# Patient Record
Sex: FEMALE | Race: WHITE | Employment: OTHER | URBAN - METROPOLITAN AREA
[De-identification: names, ages, dates, MRNs, and addresses within clinical notes are randomized per-mention and may not be internally consistent; named-entity substitution may affect disease eponyms.]

---

## 2017-01-29 ENCOUNTER — APPOINTMENT (OUTPATIENT)
Dept: GENERAL RADIOLOGY | Age: 77
End: 2017-01-29
Attending: EMERGENCY MEDICINE
Payer: MEDICARE

## 2017-01-29 ENCOUNTER — HOSPITAL ENCOUNTER (EMERGENCY)
Age: 77
Discharge: HOME | End: 2017-01-29
Attending: EMERGENCY MEDICINE | Admitting: EMERGENCY MEDICINE
Payer: MEDICARE

## 2017-01-29 VITALS
BODY MASS INDEX: 33.66 KG/M2 | RESPIRATION RATE: 13 BRPM | HEART RATE: 84 BPM | DIASTOLIC BLOOD PRESSURE: 56 MMHG | TEMPERATURE: 98.1 F | OXYGEN SATURATION: 97 % | WEIGHT: 190 LBS | HEIGHT: 63 IN | SYSTOLIC BLOOD PRESSURE: 118 MMHG

## 2017-01-29 DIAGNOSIS — S52.502A CLOSED FRACTURE OF DISTAL END OF LEFT RADIUS, UNSPECIFIED FRACTURE MORPHOLOGY, INITIAL ENCOUNTER: Primary | ICD-10-CM

## 2017-01-29 LAB
ATRIAL RATE: 81 BPM
CALCULATED P AXIS, ECG09: 49 DEGREES
CALCULATED R AXIS, ECG10: 13 DEGREES
CALCULATED T AXIS, ECG11: 43 DEGREES
DIAGNOSIS, 93000: NORMAL
P-R INTERVAL, ECG05: 188 MS
Q-T INTERVAL, ECG07: 388 MS
QRS DURATION, ECG06: 92 MS
QTC CALCULATION (BEZET), ECG08: 450 MS
VENTRICULAR RATE, ECG03: 81 BPM

## 2017-01-29 PROCEDURE — 99285 EMERGENCY DEPT VISIT HI MDM: CPT

## 2017-01-29 PROCEDURE — 73060 X-RAY EXAM OF HUMERUS: CPT

## 2017-01-29 PROCEDURE — 93005 ELECTROCARDIOGRAM TRACING: CPT

## 2017-01-29 PROCEDURE — 96372 THER/PROPH/DIAG INJ SC/IM: CPT

## 2017-01-29 PROCEDURE — 73100 X-RAY EXAM OF WRIST: CPT

## 2017-01-29 PROCEDURE — 73090 X-RAY EXAM OF FOREARM: CPT

## 2017-01-29 PROCEDURE — 75810000053 HC SPLINT APPLICATION

## 2017-01-29 PROCEDURE — 74011250636 HC RX REV CODE- 250/636: Performed by: EMERGENCY MEDICINE

## 2017-01-29 PROCEDURE — 74011250637 HC RX REV CODE- 250/637: Performed by: EMERGENCY MEDICINE

## 2017-01-29 RX ORDER — MORPHINE SULFATE 4 MG/ML
4 INJECTION, SOLUTION INTRAMUSCULAR; INTRAVENOUS
Status: COMPLETED | OUTPATIENT
Start: 2017-01-29 | End: 2017-01-29

## 2017-01-29 RX ORDER — OXYCODONE HYDROCHLORIDE 5 MG/1
5 TABLET ORAL
Status: DISCONTINUED | OUTPATIENT
Start: 2017-01-29 | End: 2017-01-29

## 2017-01-29 RX ORDER — OXYCODONE HYDROCHLORIDE 5 MG/1
15 TABLET ORAL ONCE
Status: COMPLETED | OUTPATIENT
Start: 2017-01-29 | End: 2017-01-29

## 2017-01-29 RX ADMIN — OXYCODONE HYDROCHLORIDE 5 MG: 5 TABLET ORAL at 05:59

## 2017-01-29 RX ADMIN — OXYCODONE HYDROCHLORIDE 10 MG: 5 TABLET ORAL at 05:48

## 2017-01-29 RX ADMIN — Medication 4 MG: at 03:26

## 2017-01-29 NOTE — ED TRIAGE NOTES
Pt brought in by EMS for a fall with left wrist pain. Pt states that she went to the bathroom and she felt dizzy when pulling up her pants and fell on her left wrist and hit the left side of her head. Pt is AxOx4, NAD.

## 2017-01-29 NOTE — ED PROVIDER NOTES
HPI Comments: 3:12 AM Lanie Del Cid is a 68 y.o. female with a history of hypertension, fibromyalgia, arthritis, breast cancer, GERD who presents to ED complaining of left wrist pain following a fall that occurred in her bathroom just prior to arrival to the ED. Patient states she caught herself with her left hand. States the left side of her head bumped the wall but states the only pain she is having is in her left wrist.    Patient denies left elbow/shoulder pain. Patient states that she takes a certain medication that causes her to have dizzy spells, but has controlled her tremors. Has h/o orthostasis as well. Denies any cp, sob, ha, blurry vision or recent illness. PCP: Marlon Licona MD    The history is provided by the patient and the spouse.         Past Medical History:   Diagnosis Date    Arrhythmia      hx PVCs    Arthritis      RHEUMATOID  and Osteo arthritis    Cancer ([facility] [address]      BREAST, broderick-broderick mastectomies    Constipation     Depression     Fibromyalgia     GERD (gastroesophageal reflux disease)     Headache(784.0)     Hearing reduced      hearing loss,ringing in ears    Hypertension     Insomnia     Neurological disorder      headaches,difficulty walking,poor memory    Osteoporosis     Other ill-defined conditions(799.89)      absessed perforated diverticulum    Other ill-defined conditions(799.89)      osteoporosis    Psychiatric disorder     Sleep apnea      CPAP    Vision decreased      poor vision,       Past Surgical History:   Procedure Laterality Date    Hx cholecystectomy      Hx gyn      Hx other surgical       multiple sx right leg and foot    Hx mastectomy  1996     bilateral mastectomy    Hx breast augmentation  1996     Saline Implants    Hx orthopaedic       RIGHT FOOT AND ANKLE SX    Hx knee arthroscopy Right     Hx gi  2012     LOW ANTERIOR RESECTION WITH COLOSTOMY AND LEFT OOPHORECTOMY         Family History:   Problem Relation Age of Onset    Hypertension Mother     Headache Mother     Diabetes Father     Hypertension Father     Heart Attack Father        Social History     Social History    Marital status:      Spouse name: N/A    Number of children: N/A    Years of education: N/A     Occupational History    Not on file. Social History Main Topics    Smoking status: Former Smoker     Quit date: 11/25/2006    Smokeless tobacco: Never Used    Alcohol use No    Drug use: No    Sexual activity: Not on file     Other Topics Concern    Not on file     Social History Narrative         ALLERGIES: Neurontin [gabapentin]    Review of Systems   Constitutional: Negative for fever. HENT: Negative for congestion. Respiratory: Negative for cough and shortness of breath. Cardiovascular: Negative for chest pain and leg swelling. Gastrointestinal: Negative for abdominal pain, nausea and vomiting. Genitourinary: Negative for dysuria. Musculoskeletal: Positive for arthralgias (left wrist) and joint swelling (left wrist). Neurological: Positive for dizziness and tremors. Negative for speech difficulty and headaches. All other systems reviewed and are negative. Vitals:    01/29/17 0320   BP: 140/63   Pulse: 83   Resp: 17   Temp: 98.1 °F (36.7 °C)   SpO2: 96%   Weight: 86.2 kg (190 lb)   Height: 5' 3\" (1.6 m)            Physical Exam   Constitutional: She is oriented to person, place, and time. No distress. HENT:   Head: Atraumatic. Eyes: Conjunctivae are normal.   Neck: Normal range of motion. Neck supple. No midline ttp or step off   Cardiovascular: Normal rate, regular rhythm, normal heart sounds and intact distal pulses. Pulmonary/Chest: Effort normal and breath sounds normal. She exhibits no tenderness. Abdominal: Soft. Bowel sounds are normal. She exhibits no distension. There is no tenderness. There is no rebound and no guarding. Musculoskeletal: She exhibits no edema.         Left wrist: She exhibits decreased range of motion and tenderness. ttp to distal left radius. Compartments soft. Full ROM of remainder or LUE. ecchymossi to left shoulder. No asymmetry. Neurological: She is alert and oriented to person, place, and time. She has normal strength. No cranial nerve deficit or sensory deficit. Gait normal.   Skin: Skin is warm and dry. Psychiatric: She has a normal mood and affect. Nursing note and vitals reviewed. MDM  Number of Diagnoses or Management Options  Closed fracture of distal end of left radius, unspecified fracture morphology, initial encounter:   Diagnosis management comments: Kwame Bustamante is a 68 y.o. female presenting after fall. Deformity and ttp to left distal radius. Given ecchymosis to shoulder will also obtain plain films of humerus. Pt with h/o orthostasis, neuro exam wnl, no red flags in history, no further ashwini p indicated at this time. No evidence of WPW, HOCM, Brugada, prolonged QTc, or acute ischemia on ekg. ED Course       Procedures    Vitals:  Patient Vitals for the past 12 hrs:   Temp Pulse Resp BP SpO2   01/29/17 0320 98.1 °F (36.7 °C) 83 17 140/63 96 %           EKG interpretation by ED Physician:  EKG was interpreted by me at 3:07 and showed normal sinus rhythm at a rate of 81. Normal axis. No STEMI. X-Ray, CT or other radiology findings or impressions:  XR WRIST LT AP/LAT   Final Result      XR FOREARM LT AP/LAT   Final Result      XR HUMERUS LT   Final Result              Progress notes, Consult notes or additional Procedure notes:   I have discussed results of work up with patient. Pt followed by Dr Nain Thompson at [facility]. Splint applied and neurovascularly intact post splint. I have instructed her to follow up with ortho and Return precautions discussed. Patient stated verbal understanding and agrees with course and plan. Disposition:  Diagnosis:   1.  Closed fracture of distal end of left radius, unspecified fracture morphology, initial encounter        Disposition: Discharged home in stable condition      Scribe [address] [place] [address] for and in the presence of Hortensia Rosales MD 3:16 AM, 01/29/17. Signed by: Anjali Hummel, 3:16 AM, 01/29/17. Physician Attestation  I personally performed the services described in the documentation, reviewed and edited the documentation which was dictated to the scribe in my presence, and it accurately records my words and actions.   Hortensia Rosales MD  3:16 AM, 01/29/17

## 2017-01-29 NOTE — ED NOTES
I have reviewed discharge instructions with the patient. The patient verbalized understanding. Patient armband removed and shredded. Pt is AxOx4, NAD. Pt taken out of ED by wheelchair, accompanied by her spouse.

## 2017-01-29 NOTE — DISCHARGE INSTRUCTIONS
Broken Arm: Care Instructions  Your Care Instructions  Fractures can range from a small, hairline crack, to a bone or bones broken into two or more pieces. Your treatment depends on how bad the break is. Your doctor may have put your arm in a splint or cast to allow it to heal or to keep it stable until you see another doctor. It may take weeks or months for your arm to heal. You can help your arm heal with some care at home. You heal best when you take good care of yourself. Eat a variety of healthy foods, and don't smoke. You may have had a sedative to help you relax. You may be unsteady after having sedation. It can take a few hours for the medicine's effects to wear off. Common side effects of sedation include nausea, vomiting, and feeling sleepy or tired. The doctor has checked you carefully, but problems can develop later. If you notice any problems or new symptoms, get medical treatment right away. Follow-up care is a key part of your treatment and safety. Be sure to make and go to all appointments, and call your doctor if you are having problems. It's also a good idea to know your test results and keep a list of the medicines you take. How can you care for yourself at home? · If the doctor gave you a sedative:  ¨ For 24 hours, don't do anything that requires attention to detail. It takes time for the medicine's effects to completely wear off. ¨ For your safety, do not drive or operate any machinery that could be dangerous. Wait until the medicine wears off and you can think clearly and react easily. · Put ice or a cold pack on your arm for 10 to 20 minutes at a time. Try to do this every 1 to 2 hours for the next 3 days (when you are awake). Put a thin cloth between the ice and your cast or splint. Keep the cast or splint dry. · Follow the cast care instructions your doctor gives you. If you have a splint, do not take it off unless your doctor tells you to. · Be safe with medicines.  Take pain medicines exactly as directed. ¨ If the doctor gave you a prescription medicine for pain, take it as prescribed. ¨ If you are not taking a prescription pain medicine, ask your doctor if you can take an over-the-counter medicine. · Prop up your arm on pillows when you sit or lie down in the first few days after the injury. Keep the arm higher than the level of your heart. This will help reduce swelling. · Follow instructions for exercises to keep your arm strong. · Wiggle your fingers and wrist often to reduce swelling and stiffness. When should you call for help? Call 911 anytime you think you may need emergency care. For example, call if:  · You have trouble breathing. · You passed out (lost consciousness). Call your doctor now or seek immediate medical care if:  · You have new or worse nausea or vomiting. · You have increased or severe pain. · Your hand is cool or pale or changes color. · You have tingling, weakness, or numbness in your hand or fingers. · Your cast or splint feels too tight. · You cannot move your fingers. · The skin under your cast or splint is burning or stinging. Watch closely for changes in your health, and be sure to contact your doctor if:  · You do not get better as expected. Where can you learn more? Go to http://ammy-landon.info/. Enter C277 in the search box to learn more about \"Broken Arm: Care Instructions. \"  Current as of: May 23, 2016  Content Version: 11.1  © 6546-8741 "[employer] Care instructions adapted under license by [employer] (which disclaims liability or warranty for this information). If you have questions about a medical condition or this instruction, always ask your healthcare professional. [place] [address] any warranty or liability for your use of this information.

## 2017-01-29 NOTE — ED NOTES
Dropped one 5 mg tab of Roxicodone on the floor, 10 mg Roxicodone given to pt. Medication wasted in pyxis. New order for 5 mg Roxicodone obtained.

## 2017-01-31 ENCOUNTER — OFFICE VISIT (OUTPATIENT)
Dept: ORTHOPEDIC SURGERY | Age: 77
End: 2017-01-31

## 2017-01-31 ENCOUNTER — HOSPITAL ENCOUNTER (OUTPATIENT)
Dept: LAB | Age: 77
Discharge: HOME | End: 2017-01-31
Payer: MEDICARE

## 2017-01-31 VITALS
WEIGHT: 184 LBS | HEIGHT: 63 IN | TEMPERATURE: 98.7 F | HEART RATE: 84 BPM | DIASTOLIC BLOOD PRESSURE: 76 MMHG | BODY MASS INDEX: 32.6 KG/M2 | SYSTOLIC BLOOD PRESSURE: 154 MMHG

## 2017-01-31 DIAGNOSIS — Z01.818 PRE-OPERATIVE EXAMINATION: ICD-10-CM

## 2017-01-31 DIAGNOSIS — S52.532A CLOSED COLLES' FRACTURE OF LEFT RADIUS, INITIAL ENCOUNTER: ICD-10-CM

## 2017-01-31 DIAGNOSIS — S52.532A CLOSED COLLES' FRACTURE OF LEFT RADIUS, INITIAL ENCOUNTER: Primary | ICD-10-CM

## 2017-01-31 LAB
ALBUMIN SERPL BCP-MCNC: 4.1 G/DL (ref 3.4–5)
ALBUMIN/GLOB SERPL: 1.5 {RATIO} (ref 0.8–1.7)
ALP SERPL-CCNC: 51 U/L (ref 45–117)
ALT SERPL-CCNC: 14 U/L (ref 13–56)
ANION GAP BLD CALC-SCNC: 10 MMOL/L (ref 3–18)
AST SERPL W P-5'-P-CCNC: 16 U/L (ref 15–37)
ATRIAL RATE: 92 BPM
BASOPHILS # BLD AUTO: 0 K/UL (ref 0–0.1)
BASOPHILS # BLD: 1 % (ref 0–2)
BILIRUB SERPL-MCNC: 0.5 MG/DL (ref 0.2–1)
BUN SERPL-MCNC: 16 MG/DL (ref 7–18)
BUN/CREAT SERPL: 16 (ref 12–20)
CALCIUM SERPL-MCNC: 9.3 MG/DL (ref 8.5–10.1)
CALCULATED P AXIS, ECG09: 53 DEGREES
CALCULATED R AXIS, ECG10: 26 DEGREES
CALCULATED T AXIS, ECG11: 40 DEGREES
CHLORIDE SERPL-SCNC: 97 MMOL/L (ref 100–108)
CO2 SERPL-SCNC: 32 MMOL/L (ref 21–32)
CREAT SERPL-MCNC: 1.01 MG/DL (ref 0.6–1.3)
DIAGNOSIS, 93000: NORMAL
DIFFERENTIAL METHOD BLD: ABNORMAL
EOSINOPHIL # BLD: 0.4 K/UL (ref 0–0.4)
EOSINOPHIL NFR BLD: 6 % (ref 0–5)
ERYTHROCYTE [DISTWIDTH] IN BLOOD BY AUTOMATED COUNT: 13.5 % (ref 11.6–14.5)
GLOBULIN SER CALC-MCNC: 2.7 G/DL (ref 2–4)
GLUCOSE SERPL-MCNC: 96 MG/DL (ref 74–99)
HCT VFR BLD AUTO: 43.1 % (ref 35–45)
HGB BLD-MCNC: 14 G/DL (ref 12–16)
LYMPHOCYTES # BLD AUTO: 24 % (ref 21–52)
LYMPHOCYTES # BLD: 1.7 K/UL (ref 0.9–3.6)
MCH RBC QN AUTO: 31.5 PG (ref 24–34)
MCHC RBC AUTO-ENTMCNC: 32.5 G/DL (ref 31–37)
MCV RBC AUTO: 97.1 FL (ref 74–97)
MONOCYTES # BLD: 0.8 K/UL (ref 0.05–1.2)
MONOCYTES NFR BLD AUTO: 12 % (ref 3–10)
NEUTS SEG # BLD: 4 K/UL (ref 1.8–8)
NEUTS SEG NFR BLD AUTO: 57 % (ref 40–73)
P-R INTERVAL, ECG05: 200 MS
PLATELET # BLD AUTO: 236 K/UL (ref 135–420)
PMV BLD AUTO: 11.2 FL (ref 9.2–11.8)
POTASSIUM SERPL-SCNC: 3.1 MMOL/L (ref 3.5–5.5)
PROT SERPL-MCNC: 6.8 G/DL (ref 6.4–8.2)
Q-T INTERVAL, ECG07: 346 MS
QRS DURATION, ECG06: 94 MS
QTC CALCULATION (BEZET), ECG08: 427 MS
RBC # BLD AUTO: 4.44 M/UL (ref 4.2–5.3)
SODIUM SERPL-SCNC: 139 MMOL/L (ref 136–145)
VENTRICULAR RATE, ECG03: 92 BPM
WBC # BLD AUTO: 6.9 K/UL (ref 4.6–13.2)

## 2017-01-31 PROCEDURE — 93005 ELECTROCARDIOGRAM TRACING: CPT

## 2017-01-31 RX ORDER — OXYCODONE AND ACETAMINOPHEN 5; 325 MG/1; MG/1
TABLET ORAL
Qty: 50 TAB | Refills: 0 | Status: SHIPPED | OUTPATIENT
Start: 2017-01-31 | End: 2017-03-03 | Stop reason: ALTCHOICE

## 2017-01-31 RX ORDER — PROMETHAZINE HYDROCHLORIDE 25 MG/1
25 TABLET ORAL
Qty: 30 TAB | Refills: 0 | Status: SHIPPED | OUTPATIENT
Start: 2017-01-31 | End: 2022-01-30

## 2017-01-31 RX ORDER — POLYETHYLENE GLYCOL 3350 17 G/17G
17 POWDER, FOR SOLUTION ORAL DAILY
Qty: 10 PACKET | Refills: 1 | Status: SHIPPED | OUTPATIENT
Start: 2017-01-31

## 2017-01-31 NOTE — PROGRESS NOTES
AMBULATORY PROGRESS NOTE      Patient: Lauren Corcoran             MRN: [id number]     SSN: [id number] Body mass index is 32.59 kg/(m^2). YOB: 1940     AGE: 68 y.o. EX: female    PCP: Yusef Campbell MD    IMPRESSION/DIAGNOSIS AND TREATMENT PLAN     DIAGNOSES  1. Closed Colles' fracture of left radius, initial encounter    2. Pre-operative examination        Orders Placed This Encounter    [89900] Wrist 3V    METABOLIC PANEL, COMPREHENSIVE    CBC WITH AUTOMATED DIFF    EKG, 12 LEAD, INITIAL    HI CLOSED RX DIST RAD/ULNA FX    SPLINT    promethazine (PHENERGAN) 25 mg tablet    polyethylene glycol (MIRALAX) 17 gram packet    oxyCODONE-acetaminophen (PERCOCET) 5-325 mg per tablet      Lauren Corcoran understands her diagnoses and the proposed plan. Plan:    1) HI Closed RX distal radius fracture  2) Sugar-tong placed on left wrist  3) XR: Left wrist; 3 view  4) Pre-op lab work: CMP, CBC w/ diff., EKG; follow up with PCP  5) Patient spoke with Dr. Iván Robles in the office today    RTO - Follow up with Dr. Caroline Araya IS A 68 y.o. female who presents to my outpatient office complaining of: a closed Colle's fracture of the distal end of the left radius. The patient presents to the office today with her . The injury occurred on 1/29/2017 at 2:00 AM. The patient states that she fell within the bathroom and caught herself with an outstretched palm. She is ambulating with a single point cane, and she is wearing bilateral custom hinged AFO braces. She reports that she would like to try and avoid having surgery unless necessary. The patient agreed to the proposed plan to reduce the radius. She understands the procedure and recovery period for her injury. Denies taking blood thinning medication. Patient spoke with Dr. Iván Robles regarding surgical intervention. The patient agreed with Dr. Howard Lozada for surgical intervention.     Patient reports h/o an irregular heartbeat and HTN. Juwan Licona is alert/oriented (name, location, time) and follows commands well. she  is in no acute distress and her affect and mood are appropriate. Cardiovascular/Peripheral Vascular: Normal Pulses to each hand and foot  Musculoskeletal:  LOCATION:   left wrist    HAND, WRIST, FOREARM:  left    Integumentary: No rashes, skin patches, wounds, blisters, or abrasions    Warm and normal color. No regions of expressible drainage       Deformities:  Is not present       Swelling: Regions of abnormal swelling: across the distal radius       Tenderness:  There are regions of tenderness : across the distal radius       Motor/Strength/Tone Exam: normal 5/5 strength in all tested muscle groups       Sensory Exam:  no sensory deficits noted       Stability Testing: NONE       ROM: diminished range with pain        Contractures:  none to affected region         Vascular : Normal capillary refill and digital coloration   No embolic phenomena to the toes or hands   Edema is not present        Neuro Exam:  Sensation : no focal            Motor function: functional    CHART REVIEW     Past Medical History   Diagnosis Date    Arrhythmia      hx PVCs    Arthritis      RHEUMATOID  and Osteo arthritis    Balance disorder 12/2016    Cancer ([facility] [address]      BREAST, broderick-broderick mastectomies    Constipation     Depression     Diverticular disease     Fibromyalgia     GERD (gastroesophageal reflux disease)     Headache(784.0)     Hearing reduced      hearing loss,ringing in ears    Hypertension     Insomnia     Neurological disorder      headaches,difficulty walking,poor memory    Osteoporosis     Other ill-defined conditions(799.89)      absessed perforated diverticulum    Other ill-defined conditions(799.89)      osteoporosis    Psychiatric disorder     Sleep apnea      CPAP    Vision decreased      poor vision,     Current Outpatient Prescriptions   Medication Sig    promethazine (PHENERGAN) 25 mg tablet Take 1 Tab by mouth every six (6) hours as needed for Nausea.  polyethylene glycol (MIRALAX) 17 gram packet Take 1 Packet by mouth daily.  oxyCODONE-acetaminophen (PERCOCET) 5-325 mg per tablet TAKE ONE TO TWO TABLETS PO Q 6 HRS AS NEEDED FOR PAIN **AFTER SURGERY** DO NOT TAKE BEFORE SURGERY  Indications: PAIN    [START ON 2/5/2017] morphine CR (MS CONTIN) 30 mg CR tablet Take 1 Tab by mouth every twelve (12) hours for 30 days. Max Daily Amount: 60 mg.    HYDROcodone-acetaminophen (NORCO)  mg tablet Take 1 Tab by mouth every six (6) hours as needed for Pain for up to 30 days. Max Daily Amount: 4 Tabs. May take an additional tablet on bad days not to exceed #135 per month. Indications: PAIN    Calcium-Cholecalciferol, D3, 600 mg(1,500mg) -400 unit cap Take  by mouth.  atorvastatin (LIPITOR) 10 mg tablet Take  by mouth daily.  lurasidone (LATUDA) 20 mg tab tablet Take  by mouth.  ondansetron (ZOFRAN ODT) 4 mg disintegrating tablet Take 1-2 tablets every 6-8 hours as needed for nausea and vomiting.  Denosumab (PROLIA) 60 mg/mL injection 60 mg by SubCUTAneous route.  ondansetron (ZOFRAN ODT) 4 mg disintegrating tablet Take 4 mg by mouth every eight (8) hours as needed for Nausea. Indications: ACUTE GASTROENTERITIS-RELATED VOMITING IN PEDIATRICS    buPROPion XL (WELLBUTRIN XL) 150 mg tablet Take 300 mg by mouth every morning.  polyethylene glycol (MIRALAX) 17 gram packet Take 17 g by mouth daily.  ALPRAZolam (XANAX) 1 mg tablet Take 1 mg by mouth as needed.  traZODone (DESYREL) 50 mg tablet Take 150 mg by mouth nightly. Indications: INSOMNIA    omeprazole (PRILOSEC) 20 mg capsule Take 20 mg by mouth two (2) times a day. Indications: GASTROESOPHAGEAL REFLUX    potassium chloride (K-DUR, KLOR-CON) 20 mEq tablet Take 1 Tab by mouth two (2) times a day. Indications: HYPOKALEMIA    Desvenlafaxine SR (PRISTIQ) 50 mg tablet Take 50 mg by mouth.  levorphanol (LEVO-DROMORAN) 2 mg tablet Take 1 Tab by mouth every eight (8) hours as needed. Max Daily Amount: 6 mg. OK to fill 9/2/15. Rx for Zohydro was too expensive and not filled    lidocaine (LIDODERM) 5 %(700 mg/patch) 1 patch by TransDERmal route. Apply patch to the affected area for 12 hours a day and remove for 12 hours a day.  fexofenadine-pseudoephedrine (ALLEGRA-D 24 HOUR) 180-240 mg per tablet Take 1 tablet by mouth daily.  diltiazem hcl (CARDIZEM) 120 mg tablet Take 120 mg by mouth daily.  diclofenac (VOLTAREN) 1 % topical gel Apply 4 g to affected area four (4) times daily. No current facility-administered medications for this visit. Allergies   Allergen Reactions    Neurontin [Gabapentin] Other (comments)     Past Surgical History   Procedure Laterality Date    Hx cholecystectomy      Hx gyn      Hx other surgical       multiple sx right leg and foot    Hx mastectomy  1996     bilateral mastectomy    Hx breast augmentation  1996     Saline Implants    Hx orthopaedic       RIGHT FOOT AND ANKLE SX    Hx knee arthroscopy Right     Hx gi  2012     LOW ANTERIOR RESECTION WITH COLOSTOMY AND LEFT OOPHORECTOMY    Pr foot/toes surgery proc unlisted       Social History     Occupational History    Not on file. Social History Main Topics    Smoking status: Former Smoker     Quit date: 11/25/2006    Smokeless tobacco: Never Used    Alcohol use No    Drug use: No    Sexual activity: Not on file     Family History   Problem Relation Age of Onset    Hypertension Mother     Headache Mother     Diabetes Father     Hypertension Father     Heart Attack Father        REVIEW OF SYSTEMS : 2/1/2017  ALL BELOW ARE Negative except : SEE HPI       Constitutional: Negative for fever, chills and weight loss. Neg Weigh Loss  Cardiovascular: Negative for chest pain, claudication and leg swelling.  SOB, MAX   Gastrointestinal: Negative for  pain, N/V/D/C, Blood in stool or urine,dysuria, hematuria,        Incontinence, pelvic pain  Musculoskeletal: see HPI. Neurological: Negative for dizziness and weakness. Negative for headaches,Visual Changes, Confusion, Seizures,   Psychiatric/Behavioral: Negative for depression, memory loss and substance abuse. Extremities:  Negative for  hair changes, rash or skin lesion changes. Hematologic: Negative for Bleeding problems, bruising, pallor or swollen lymph nodes. Peripheral Vascular: No calf pain, vascular vein tenderness to calf pain              No calf throbbing, posterior knee throbbing pain    DIAGNOSTIC IMAGING     XR Results (most recent):    Results from Hospital Encounter encounter on 01/29/17   XR FOREARM LT AP/LAT   Narrative EXAM:  XR FOREARM LT AP/LAT    INDICATION:   Pain status post fall    COMPARISON: None. FINDINGS: Two views of the left radius and ulna performed. These demonstrate a  fracture with impaction of the distal radius. Impression IMPRESSION: Impacted distal radius fracture. EXAM: XR WRIST LT AP/LAT     INDICATION: Pain left wrist area status post fall     COMPARISON: None.     FINDINGS: AP and lateral views of the left wrist demonstrate diminished bone  mineralization. There is an impacted fracture through the distal radius. The  distal ulna appears intact. Due to overlapping bony structures, positioning, and  wires, the carpal bones are poorly evaluated.      IMPRESSION  IMPRESSION: Distal left radius fracture with impaction. Poorly evaluated carpal  bones. Suggest clinical correlation, and repeat examination with proper  positioning if possible, and overlying wires removed if there is focal  tenderness in the carpal bones. HUMERUS     CPT CODE: 37392      HISTORY: Pain in the left upper arm status post fall.      COMPARISON: None.     FINDINGS:     Two views of the humerus submitted. No fracture or dislocation seen. Soft  tissues are unremarkable.  Several clips noted in the left axillary region.     IMPRESSION  IMPRESSION:     Humerus appears intact.     Written by James Unger, as dictated by Morena Lucio MD.

## 2017-01-31 NOTE — PATIENT INSTRUCTIONS
Dr. Amy Chávez Pre-operative Instructions:      Patient: Shana Quinn   :  1940     I understand I am to stop taking all Aspirin, Aspirin containing medications, Non-Steroidal Anti-Inflammatory medications (such as Advil, Aleve, Motrin, Ibuprofen) and or Blood thinner medication such as Coumadin, Plavix, Heparin or others 5 days prior to surgery. I understand I am to STOP taking these Medications 5 days prior to surgery:  I am to get instructions from my prescribing physician. 1. __As listed above_______________________  2. _____________________________________  3. _____________________________________  4. _____________________________________    I understand that if I am taking daily medications for high blood pressure, I can take them the morning of surgery with a small sip of water. I will consult my prescribing physician or call CELE[facility]INE with specific questions. I also understand that:     I am to report important observations or changes that may occur prior to surgery. If I have any changes in my physical condition, such as a rash, a fever, sore throat, abscess, ulcers, nausea, vomiting, or diarrhea. I am to call the office and I am to consult my primary care physician to assess and treat the problem.  I am not to eat or drink anything after midnight the night before my surgery.  I am not to drink alcoholic beverages 24 hours prior to surgery.  I am not to do any illegal drugs prior to surgery.  I am not to smoke at least 24 hours prior to surgery.  I am able and will shower or bathe before surgery. I will use the Hibiclens solution on my surgical site only. The hibiclens directions are one packet a day starting two days before surgery.  I will remove any nail polish, make-up or jewelry prior to arriving for my surgery.  If I wear glasses, contact lenses or dentures they must be removed prior to going to the operating room.      I will not wear any aerosol sprays, perfumes or skin creams.  I am to make arrangements for a family member or friend to accompany me to surgery and take me home after my surgery as I will not be allowed to leave the hospital alone. A cab or bus will not be acceptable. Please make arrange for someone to stay with you for 24 hours after surgery.  Patient has expressed understanding of the diagnosis, treatment and planned surgery        Surgery: What to Expect at [address] [place] [address]  This care sheet gives you a general idea about how long it will take for you to recover from your surgery. But each person recovers at a different pace. How can you care for yourself at home? Activity  · Allow your body to heal. Don't move quickly or lift anything heavy until you are feeling better. · Rest when you feel tired. · Your doctor may give you specific instructions on when you can do your normal activities again, such as driving and going back to work. · Be active. Walking is a good choice. Diet  · You can eat your normal diet when you feel well. If your stomach is upset, try bland, low-fat foods like plain rice, broiled chicken, toast, and yogurt. · If your bowel movements are not regular right after surgery, try to avoid constipation and straining. Drink plenty of water. Your doctor may suggest fiber, a stool softener, or a mild laxative. Medicines  · Your doctor will tell you if and when you can restart your medicines. He or she will also give you instructions about taking any new medicines. · If you take blood thinners, such as warfarin (Coumadin), clopidogrel (Plavix), or aspirin, be sure to talk to your doctor. He or she will tell you if and when to start taking those medicines again. Make sure that you understand exactly what your doctor wants you to do. · Be safe with medicines. Read and follow all instructions on the label. ¨ If the doctor gave you a prescription medicine for pain, take it as prescribed.   ¨ If you are not taking a prescription pain medicine, ask your doctor if you can take an over-the-counter medicine. Incision care  · You will have a dressing over the cut (incision). A dressing helps the incision heal and protects it. Your doctor will tell you how to take care of this. · If you have strips of tape on the cut the doctor made, leave the tape on for a week or until it falls off. · If you had stitches, your doctor will tell you when to come back to have them removed. · If you have skin adhesive on the cut, leave it on until it falls off. Skin adhesive is also called liquid stitches. · Change the bandage every day. · Wash the area daily with warm, soapy water, and pat it dry. Don't use hydrogen peroxide or alcohol. They can slow healing. · You may cover the area with a gauze bandage if it oozes fluid or rubs against clothing. · You may shower 24 to 48 hours after surgery. Pat the incision dry. Don't swim or take a bath for the first 2 weeks, or until your doctor tells you it is okay. Follow-up care is a key part of your treatment and safety. Be sure to make and go to all appointments, and call your doctor if you are having problems. It's also a good idea to know your test results and keep a list of the medicines you take. When should you call for help? Call 911 anytime you think you may need emergency care. For example, call if:  · You passed out (lost consciousness). · You have severe trouble breathing. · You have sudden chest pain and shortness of breath, or you cough up blood. Call your doctor now or seek immediate medical care if:  · You have pain that does not get better after you take pain medicine. · You have loose stitches, or your incision comes open. · You are bleeding through your dressing. A small amount of blood is normal.  · You have signs of infection, such as:  ¨ Increased pain, swelling, warmth, or redness. ¨ Red streaks leading from the incision.   ¨ Pus draining from the incision. ¨ A fever. · You have symptoms of a blood clot in your arm or leg (called a deep vein thrombosis). These may include:  ¨ Pain in your calf, back of the knee, thigh, or groin. ¨ Redness and swelling in the arm, leg, or groin. Watch closely for any changes in your health, and be sure to contact your doctor if:  · You do not have a bowel movement after taking a laxative. Where can you learn more? Go to http://ammy-landon.info/  Enter A443 in the search box to learn more about \"Surgery: What to Expect at Home. \"  © 7469-7018 Health[employer], [employer]. Care instructions adapted under license by [employer] (which disclaims liability or warranty for this information). This care instruction is for use with your licensed healthcare professional. If you have questions about a medical condition or this instruction, always ask your healthcare professional. [place] [address] any warranty or liability for your use of this information. Content Version: 52.6.438169; Current as of: November 20, 2015         Broken Wrist: Care Instructions  Your Care Instructions    Your wrist can break, or fracture, during sports, a fall, or other accidents. The break may happen when your wrist is hit or is used to protect you in a fall. Fractures can range from a small, hairline crack, to a bone or bones broken into two or more pieces. Your treatment depends on how bad the break is. Your doctor may have put your wrist in a cast or splint. This will help keep your wrist stable until your follow-up appointment. It may take weeks or months for your wrist to heal. You can help it heal with care at home. You heal best when you take good care of yourself. Eat a variety of healthy foods, and don't smoke. Follow-up care is a key part of your treatment and safety. Be sure to make and go to all appointments, and call your doctor if you are having problems.  It's also a good idea to know your test results and keep a list of the medicines you take. How can you care for yourself at home? · Put ice or a cold pack on your wrist for 10 to 20 minutes at a time. Try to do this every 1 to 2 hours for the next 3 days (when you are awake). Put a thin cloth between the ice and your cast or splint. Keep your cast or splint dry. · Follow the splint or cast care instructions your doctor gives you. If you have a splint, do not take it off unless your doctor tells you to. Be careful not to put the splint on too tight. · Be safe with medicines. Take pain medicines exactly as directed. ¨ If the doctor gave you a prescription medicine for pain, take it as prescribed. ¨ If you are not taking a prescription pain medicine, ask your doctor if you can take an over-the-counter medicine. · Prop up your wrist on pillows when you sit or lie down in the first few days after the injury. Keep your wrist higher than the level of your heart. This will help reduce swelling. · Move your fingers often to reduce swelling and stiffness, but do not use that hand to grab or carry anything. · Follow instructions for exercises to keep your arm strong. When should you call for help? Call your doctor now or seek immediate medical care if:  · You have increased or severe pain. · Your cast or splint feels too tight. · You cannot move your fingers. · You have tingling, weakness, or numbness in your hand and fingers. · Your hand and fingers are cool or pale or change color. · You have a lot of swelling near your cast or splint. · The skin under your cast or splint is burning or stinging. Watch closely for changes in your health, and be sure to contact your doctor if:  · You do not get better as expected. Where can you learn more? Go to http://ammy-landon.info/. Enter [id number] in the search box to learn more about \"Broken Wrist: Care Instructions. \"  Current as of: May 23, 2016  Content Version: 11.1  © 5600-4541 [employer]. Care instructions adapted under license by [employer] (which disclaims liability or warranty for this information). If you have questions about a medical condition or this instruction, always ask your healthcare professional. [address] any warranty or liability for your use of this information.

## 2017-01-31 NOTE — H&P
HISTORY AND PHYSICAL      Patient: Alexis Slater                MRN: [id number]       SSN: [id number]  YOB: 1940                        AGE: 68 y.o. SEX: female      Patient scheduled for:  Open reduction internal fixation left distal radius fracture, possible bone grafting   Date of surgery: 2/2/17   Location of Surgery: [address] [facility]  Surgeon: Nicci Bermudez MD    ANESTHESIA TYPE:  General      HISTORY:     The patient was seen in the office today for a preoperative history and physical for an upcoming above listed surgery. The patient is a pleasant 68 y.o. female who has a history of a fall while at home in her bathroom resulting in a left distal radius fracture. Due to the current findings, affected activity of daily living and continued pain and discomfort, surgical intervention is indicated. The alternatives, risks, and complications, including but not limited to infection, blood loss, need for blood transfusion, neurovascular damage, elliot-incisional numbness, subcutaneous hematoma, bone fracture, anesthetic complications, DVT, PE, death, RSD, postoperative stiffness and pain, possible surgical scar, delayed healing and nonhealing, reflexive sympathetic dystrophy, damage to blood vessels and nerves, need for more surgery, MI, and stroke have been discussed. The patient understands and wishes to proceed with surgery.      PAST MEDICAL HISTORY:     Past Medical History   Diagnosis Date    Arrhythmia      hx PVCs    Arthritis      RHEUMATOID  and Osteo arthritis    Balance disorder 12/2016    Cancer ([facility] [address]      BREAST, broderick-broderick mastectomies    Constipation     Depression     Diverticular disease     Fibromyalgia     GERD (gastroesophageal reflux disease)     Headache(784.0)     Hearing reduced      hearing loss,ringing in ears    Hypertension     Insomnia     Neurological disorder      headaches,difficulty walking,poor memory   Cindy Meza Osteoporosis     Other ill-defined conditions(799.89)      absessed perforated diverticulum    Other ill-defined conditions(799.89)      osteoporosis    Psychiatric disorder     Sleep apnea      CPAP    Vision decreased      poor vision,       CURRENT MEDICATIONS:     Current Outpatient Prescriptions   Medication Sig Dispense Refill    promethazine (PHENERGAN) 25 mg tablet Take 1 Tab by mouth every six (6) hours as needed for Nausea. 30 Tab 0    polyethylene glycol (MIRALAX) 17 gram packet Take 1 Packet by mouth daily. 10 Packet 1    oxyCODONE-acetaminophen (PERCOCET) 5-325 mg per tablet TAKE ONE TO TWO TABLETS PO Q 6 HRS AS NEEDED FOR PAIN **AFTER SURGERY** DO NOT TAKE BEFORE SURGERY  Indications: PAIN 50 Tab 0    [START ON 2/5/2017] morphine CR (MS CONTIN) 30 mg CR tablet Take 1 Tab by mouth every twelve (12) hours for 30 days. Max Daily Amount: 60 mg. 60 Tab 0    HYDROcodone-acetaminophen (NORCO)  mg tablet Take 1 Tab by mouth every six (6) hours as needed for Pain for up to 30 days. Max Daily Amount: 4 Tabs. May take an additional tablet on bad days not to exceed #135 per month. Indications: PAIN 135 Tab 0    Calcium-Cholecalciferol, D3, 600 mg(1,500mg) -400 unit cap Take  by mouth.  atorvastatin (LIPITOR) 10 mg tablet Take  by mouth daily.  lurasidone (LATUDA) 20 mg tab tablet Take  by mouth.  ondansetron (ZOFRAN ODT) 4 mg disintegrating tablet Take 1-2 tablets every 6-8 hours as needed for nausea and vomiting. 10 Tab 0    Denosumab (PROLIA) 60 mg/mL injection 60 mg by SubCUTAneous route.  ondansetron (ZOFRAN ODT) 4 mg disintegrating tablet Take 4 mg by mouth every eight (8) hours as needed for Nausea. Indications: ACUTE GASTROENTERITIS-RELATED VOMITING IN PEDIATRICS      buPROPion XL (WELLBUTRIN XL) 150 mg tablet Take 300 mg by mouth every morning.  polyethylene glycol (MIRALAX) 17 gram packet Take 17 g by mouth daily.       ALPRAZolam (XANAX) 1 mg tablet Take 1 mg by mouth as needed.  traZODone (DESYREL) 50 mg tablet Take 150 mg by mouth nightly. Indications: INSOMNIA      omeprazole (PRILOSEC) 20 mg capsule Take 20 mg by mouth two (2) times a day. Indications: GASTROESOPHAGEAL REFLUX      Desvenlafaxine SR (PRISTIQ) 50 mg tablet Take 50 mg by mouth.  levorphanol (LEVO-DROMORAN) 2 mg tablet Take 1 Tab by mouth every eight (8) hours as needed. Max Daily Amount: 6 mg. OK to fill 9/2/15. Rx for Zohydro was too expensive and not filled 90 Tab 0    lidocaine (LIDODERM) 5 %(700 mg/patch) 1 patch by TransDERmal route. Apply patch to the affected area for 12 hours a day and remove for 12 hours a day.  fexofenadine-pseudoephedrine (ALLEGRA-D 24 HOUR) 180-240 mg per tablet Take 1 tablet by mouth daily.  diltiazem hcl (CARDIZEM) 120 mg tablet Take 120 mg by mouth daily.  diclofenac (VOLTAREN) 1 % topical gel Apply 4 g to affected area four (4) times daily.          ALLERGIES:     Allergies   Allergen Reactions    Neurontin [Gabapentin] Other (comments)         SURGICAL HISTORY:     Past Surgical History   Procedure Laterality Date    Hx cholecystectomy      Hx gyn      Hx other surgical       multiple sx right leg and foot    Hx mastectomy  1996     bilateral mastectomy    Hx breast augmentation  1996     Saline Implants    Hx orthopaedic       RIGHT FOOT AND ANKLE SX    Hx knee arthroscopy Right     Hx gi  2012     LOW ANTERIOR RESECTION WITH COLOSTOMY AND LEFT OOPHORECTOMY    Pr foot/toes surgery proc unlisted         SOCIAL HISTORY:     Social History     Social History    Marital status:      Spouse name: N/A    Number of children: N/A    Years of education: N/A     Social History Main Topics    Smoking status: Former Smoker     Quit date: 11/25/2006    Smokeless tobacco: Never Used    Alcohol use No    Drug use: No    Sexual activity: Not Asked     Other Topics Concern    None     Social History Narrative       FAMILY HISTORY: Family History   Problem Relation Age of Onset    Hypertension Mother     Headache Mother     Diabetes Father     Hypertension Father     Heart Attack Father        REVIEW OF SYSTEMS:     Negative for fevers, chills, chest pain, shortness of breath, weight loss, recent illness    General: Negative for fever and chills. No unexpected change in weight. Denies fatigue. No change in appetite. Skin: Negative for rash or itching. HEENT: Negative for congestion, sore throat, neck pain and neck stiffness. No change in vision or hearing. Hasn't noted any enlarged lymph nodes in the neck. Cardiovascular:  Negative for chest pain and palpitations. Has not noted pedal edema. Positive for irregular heart beat  Respiratory: Negative for cough, colds, sinus, hemoptysis, shortness of breath and wheezing. Gastrointestinal: Negative for nausea and vomiting, rectal bleeding, coffee ground emesis, abdominal pain, diarrhea and constipation. + for history of diverticulitis  Genitourinary: Negative for dysuria, frequency urgency, or burning on micturition. No flank pain, no foul smelling urine, no difficulty with initiating urination. Hematological: Negative for bleeding or easy bruising. Musculoskeletal: Negative  for arthralgias, back pain or neck pain. Neurological: Negative for seizures or syncopal episodes. Denies headaches. Positive for dizziness x 1 month  Endocrine: Denies excessive thirst.  No heat/cold intolerance. Psychiatric: Negative for depression or insomnia. PHYSICAL EXAMINATION:     VITALS:   Visit Vitals    /76    Pulse 84    Temp 98.7 °F (37.1 °C) (Oral)    Ht 5' 3\" (1.6 m)    Wt 184 lb (83.5 kg)    BMI 32.59 kg/m2       GEN:  Well developed, well nourished 68 y.o. female in no acute distress. PSYCH: Alert an oriented to person, place and time. Mood, memory, affect, behavior and judgment normal  HEENT: Normocephalic and atraumatic. Eyes: Conjunctivae and EOM are normal.Pupils are equal, round, and reactive to light. External ear normal appearance, external nose normal appearing. Mouth/Throat: Oropharynx is clear and moist, able to handle oral secretions w/out difficulty, airway patent  NECK: Supple. Normal ROM, No lymphadenopathy. Trachea is midline. No bruising, swelling or deformity  RESP: Clear to auscultation bilaterally. No wheezes, rales, rhonchi. Normal effort and breath sounds. No respiratory distress  CARDIO:  Normal rate, regular rhythm and normal heart sounds. No MGR. ABDOMEN: Soft, non-tender, non-distended, normoactive bowel sounds in all four quadrants. There is no tenderness. There is no rebound and no guarding. BACK: No CVA or spinal tenderness  BREAST:  Deferred  PELVIC:    Deferred   RECTAL:  Deferred   :           Deferred  EXTREMITIES: EXAMINATION OF: left upper extremity  SKIN: mild edema , no erythema, mild  ecchymosis    TENDERNESS:  mild tenderness to palpation  NEUROVASCULAR: Sensation intact to light touch and strength grossly intact and symmetrical. No nystagmus. Positive distal pulses and capillary refill. ROM: not examined  MOTOR: In tact    RADIOGRAPHS & DIAGNOSTIC STUDIES:     X-rays of the left wrist, three views reveals a left distal radius fracture with impaction.        LABS:     Pending    ASSESSMENT:       Encounter Diagnoses   Name Primary?  Closed Colles' fracture of left radius, initial encounter Yes    Pre-operative examination        PLAN:     Again, the alternatives, risks, and complications, as well as expected outcome were discussed. The patient understands and agrees to proceed with the above listed surgery pending completion of preoperative labs and diagnostic studies.        PRESCRIPTIONS AND/OR ORDERS PROVIDED DURING H&P:    Orders Placed This Encounter    [02586] Wrist 3V    METABOLIC PANEL, COMPREHENSIVE    CBC WITH AUTOMATED DIFF    EKG, 12 LEAD, INITIAL    NC CLOSED RX DIST RAD/ULNA FX    SPLINT    promethazine (PHENERGAN) 25 mg tablet    polyethylene glycol (MIRALAX) 17 gram packet    oxyCODONE-acetaminophen (PERCOCET) 5-325 mg per tablet             Silvano Jo PA-C  1/31/2017  3:01 PM

## 2017-01-31 NOTE — MR AVS SNAPSHOT
Visit Information Date & Time Provider Department Dept. Phone Encounter #  
 1/31/2017  1:45 PM Jacy Burgess MD [facility] [phone] [id number] Follow-up Instructions Return in about 2 weeks (around 2/14/2017) for post surgical evaluation. Your Appointments 2/8/2017  2:20 PM  
Follow Up with GURJIT Hernandez [address] [place] [address] for Pain Management (EDGAR SCHEDULING) Appt Note: return in 3 months [address] [facility] [address] [phone] [facility] [phone] Upcoming Health Maintenance Date Due DTaP/Tdap/Td series (1 - Tdap) 7/18/1961 ZOSTER VACCINE AGE 60> 7/18/2000 GLAUCOMA SCREENING Q2Y 7/18/2005 Pneumococcal 65+ Low/Medium Risk (1 of 2 - PCV13) 7/18/2005 MEDICARE YEARLY EXAM 7/18/2005 INFLUENZA AGE 9 TO ADULT 8/1/2016 Allergies as of 1/31/2017  Review Complete On: 1/31/2017 By: Aure Reyes Severity Noted Reaction Type Reactions Neurontin [Gabapentin]  05/12/2015    Other (comments) Current Immunizations  Never Reviewed No immunizations on file. Not reviewed this visit You Were Diagnosed With   
  
 Codes Comments Closed Colles' fracture of left radius, initial encounter    -  Primary ICD-10-CM: S52.532A ICD-9-CM: 813.41 Pre-operative examination     ICD-10-CM: L16.296 ICD-9-CM: V72.84 Vitals BP Pulse Temp Height(growth percentile) Weight(growth percentile) BMI  
 154/76 84 98.7 °F (37.1 °C) (Oral) 5' 3\" (1.6 m) 184 lb (83.5 kg) 32.59 kg/m2 OB Status Smoking Status Postmenopausal Former Smoker BMI and BSA Data Body Mass Index Body Surface Area 32.59 kg/m 2 1.93 m 2 Preferred Pharmacy Pharmacy Name Phone [facility] - [place], [address] [place] [address]. [phone] Your Updated Medication List  
  
   
 This list is accurate as of: 1/31/17  3:06 PM.  Always use your most recent med list. ALLEGRA-D 24 HOUR 180-240 mg per tablet Generic drug:  fexofenadine-pseudoephedrine Take 1 tablet by mouth daily. atorvastatin 10 mg tablet Commonly known as:  LIPITOR Take  by mouth daily. Calcium-Cholecalciferol (D3) 600 mg(1,500mg) -400 unit Cap Take  by mouth. CARDIZEM 120 mg tablet Generic drug:  dilTIAZem Take 120 mg by mouth daily. HYDROcodone-acetaminophen  mg tablet Commonly known as:  [address] Take 1 Tab by mouth every six (6) hours as needed for Pain for up to 30 days. Max Daily Amount: 4 Tabs. May take an additional tablet on bad days not to exceed #135 per month. Indications: PAIN  
  
 LATUDA 20 mg Tab tablet Generic drug:  lurasidone Take  by mouth.  
  
 levorphanol 2 mg tablet Commonly known as:  LEVO-DROMORAN Take 1 Tab by mouth every eight (8) hours as needed. Max Daily Amount: 6 mg. OK to fill 9/2/15. Rx for Zohydro was too expensive and not filled  
  
 lidocaine 5 % Commonly known as:  [place] 1 patch by TransDERmal route. Apply patch to the affected area for 12 hours a day and remove for 12 hours a day. * MIRALAX 17 gram packet Generic drug:  polyethylene glycol Take 17 g by mouth daily. * polyethylene glycol 17 gram packet Commonly known as:  Reji Bill Take 1 Packet by mouth daily. morphine CR 30 mg CR tablet Commonly known as:  MS CONTIN Take 1 Tab by mouth every twelve (12) hours for 30 days. Max Daily Amount: 60 mg. Start taking on:  2/5/2017 * ondansetron 4 mg disintegrating tablet Commonly known as:  ZOFRAN ODT Take 4 mg by mouth every eight (8) hours as needed for Nausea. Indications: ACUTE GASTROENTERITIS-RELATED VOMITING IN PEDIATRICS  
  
 * ondansetron 4 mg disintegrating tablet Commonly known as:  ZOFRAN ODT Take 1-2 tablets every 6-8 hours as needed for nausea and vomiting. oxyCODONE-acetaminophen 5-325 mg per tablet Commonly known as:  PERCOCET TAKE ONE TO TWO TABLETS PO Q 6 HRS AS NEEDED FOR PAIN **AFTER SURGERY** DO NOT TAKE BEFORE SURGERY  Indications: PAIN PriLOSEC 20 mg capsule Generic drug:  omeprazole Take 20 mg by mouth two (2) times a day. Indications: GASTROESOPHAGEAL REFLUX PRISTIQ 50 mg tablet Generic drug:  Desvenlafaxine SR Take 50 mg by mouth. PROLIA 60 mg/mL injection Generic drug:  denosumab 60 mg by SubCUTAneous route. promethazine 25 mg tablet Commonly known as:  PHENERGAN Take 1 Tab by mouth every six (6) hours as needed for Nausea. traZODone 50 mg tablet Commonly known as:  Debbe Gunner Take 150 mg by mouth nightly. Indications: INSOMNIA  
  
 VOLTAREN 1 % Gel Generic drug:  diclofenac Apply 4 g to affected area four (4) times daily. WELLBUTRIN  mg tablet Generic drug:  buPROPion XL Take 300 mg by mouth every morning. XANAX 1 mg tablet Generic drug:  ALPRAZolam  
Take 1 mg by mouth as needed. * Notice: This list has 4 medication(s) that are the same as other medications prescribed for you. Read the directions carefully, and ask your doctor or other care provider to review them with you. Prescriptions Printed Refills  
 promethazine (PHENERGAN) 25 mg tablet 0 Sig: Take 1 Tab by mouth every six (6) hours as needed for Nausea. Class: Print Route: Oral  
 polyethylene glycol (MIRALAX) 17 gram packet 1 Sig: Take 1 Packet by mouth daily. Class: Print Route: Oral  
 oxyCODONE-acetaminophen (PERCOCET) 5-325 mg per tablet 0 Sig: TAKE ONE TO TWO TABLETS PO Q 6 HRS AS NEEDED FOR PAIN **AFTER SURGERY** DO NOT TAKE BEFORE SURGERY  Indications: PAIN Class: Print We Performed the Following AMB POC XRAY, WRIST; COMPLETE, 3+ VIE [54850 CPT(R)] ME CLOSED RX DIST RAD/ULNA FX [id number] CPT(R)] SPLINT [id number] [facility]] Follow-up Instructions Return in about 2 weeks (around 2017) for post surgical evaluation. To-Do List   
 2017 Lab:  CBC WITH AUTOMATED DIFF   
  
 2017 ECG:  EKG, 12 LEAD, INITIAL   
  
 2017 Lab:  METABOLIC PANEL, COMPREHENSIVE Patient Instructions Dr. Blanquita Martinez Pre-operative Instructions: 
 
 
Patient: Blane Russell :  1940 I understand I am to stop taking all Aspirin, Aspirin containing medications, Non-Steroidal Anti-Inflammatory medications (such as Advil, Aleve, Motrin, Ibuprofen) and or Blood thinner medication such as Coumadin, Plavix, Heparin or others 5 days prior to surgery. I understand I am to STOP taking these Medications 5 days prior to surgery: 
I am to get instructions from my prescribing physician. 1. __As listed above_______________________ 2. _____________________________________ 3. _____________________________________ 4. _____________________________________ I understand that if I am taking daily medications for high blood pressure, I can take them the morning of surgery with a small sip of water. I will consult my prescribing physician or call CELESTINE with specific questions. I also understand that: ? I am to report important observations or changes that may occur prior to surgery. If I have any changes in my physical condition, such as a rash, a fever, sore throat, abscess, ulcers, nausea, vomiting, or diarrhea. I am to call the office and I am to consult my primary care physician to assess and treat the problem. ? I am not to eat or drink anything after midnight the night before my surgery. ? I am not to drink alcoholic beverages 24 hours prior to surgery. ? I am not to do any illegal drugs prior to surgery. ? I am not to smoke at least 24 hours prior to surgery. ? I am able and will shower or bathe before surgery. I will use the Hibiclens solution on my surgical site only. The hibiclens directions are one packet a day starting two days before surgery. ? I will remove any nail polish, make-up or jewelry prior to arriving for my surgery. ? If I wear glasses, contact lenses or dentures they must be removed prior to going to the operating room. ? I will not wear any aerosol sprays, perfumes or skin creams. ? I am to make arrangements for a family member or friend to accompany me to surgery and take me home after my surgery as I will not be allowed to leave the hospital alone. A cab or bus will not be acceptable. Please make arrange for someone to stay with you for 24 hours after surgery. ? Patient has expressed understanding of the diagnosis, treatment and planned surgery Surgery: What to Expect at [facility] Your Recovery This care sheet gives you a general idea about how long it will take for you to recover from your surgery. But each person recovers at a different pace. How can you care for yourself at home? Activity · Allow your body to heal. Don't move quickly or lift anything heavy until you are feeling better. · Rest when you feel tired. · Your doctor may give you specific instructions on when you can do your normal activities again, such as driving and going back to work. · Be active. Walking is a good choice. Diet · You can eat your normal diet when you feel well. If your stomach is upset, try bland, low-fat foods like plain rice, broiled chicken, toast, and yogurt. · If your bowel movements are not regular right after surgery, try to avoid constipation and straining. Drink plenty of water. Your doctor may suggest fiber, a stool softener, or a mild laxative. Medicines · Your doctor will tell you if and when you can restart your medicines. He or she will also give you instructions about taking any new medicines.  
· If you take blood thinners, such as warfarin (Coumadin), clopidogrel (Plavix), or aspirin, be sure to talk to your doctor. He or she will tell you if and when to start taking those medicines again. Make sure that you understand exactly what your doctor wants you to do. · Be safe with medicines. Read and follow all instructions on the label. ¨ If the doctor gave you a prescription medicine for pain, take it as prescribed. ¨ If you are not taking a prescription pain medicine, ask your doctor if you can take an over-the-counter medicine. Incision care · You will have a dressing over the cut (incision). A dressing helps the incision heal and protects it. Your doctor will tell you how to take care of this. · If you have strips of tape on the cut the doctor made, leave the tape on for a week or until it falls off. · If you had stitches, your doctor will tell you when to come back to have them removed. · If you have skin adhesive on the cut, leave it on until it falls off. Skin adhesive is also called liquid stitches. · Change the bandage every day. · Wash the area daily with warm, soapy water, and pat it dry. Don't use hydrogen peroxide or alcohol. They can slow healing. · You may cover the area with a gauze bandage if it oozes fluid or rubs against clothing. · You may shower 24 to 48 hours after surgery. Pat the incision dry. Don't swim or take a bath for the first 2 weeks, or until your doctor tells you it is okay. Follow-up care is a key part of your treatment and safety. Be sure to make and go to all appointments, and call your doctor if you are having problems. It's also a good idea to know your test results and keep a list of the medicines you take. When should you call for help? Call 911 anytime you think you may need emergency care. For example, call if: 
· You passed out (lost consciousness). · You have severe trouble breathing. · You have sudden chest pain and shortness of breath, or you cough up blood. Call your doctor now or seek immediate medical care if: · You have pain that does not get better after you take pain medicine. · You have loose stitches, or your incision comes open. · You are bleeding through your dressing. A small amount of blood is normal. 
· You have signs of infection, such as: 
¨ Increased pain, swelling, warmth, or redness. ¨ Red streaks leading from the incision. ¨ Pus draining from the incision. ¨ A fever. · You have symptoms of a blood clot in your arm or leg (called a deep vein thrombosis). These may include: 
¨ Pain in your calf, back of the knee, thigh, or groin. ¨ Redness and swelling in the arm, leg, or groin. Watch closely for any changes in your health, and be sure to contact your doctor if: 
· You do not have a bowel movement after taking a laxative. Where can you learn more? Go to http://ammyTreemo Labslandon.info/ Enter W394 in the search box to learn more about \"Surgery: What to Expect at Home. \" 
© 7267-5078 Healthwise, Incorporated. Care instructions adapted under license by [employer] (which disclaims liability or warranty for this information). This care instruction is for use with your licensed healthcare professional. If you have questions about a medical condition or this instruction, always ask your healthcare professional. [address] any warranty or liability for your use of this information. Content Version: 14.3.706327; Current as of: November 20, 2015 Broken Wrist: Care Instructions Your Care Instructions Your wrist can break, or fracture, during sports, a fall, or other accidents. The break may happen when your wrist is hit or is used to protect you in a fall. Fractures can range from a small, hairline crack, to a bone or bones broken into two or more pieces. Your treatment depends on how bad the break is. Your doctor may have put your wrist in a cast or splint. This will help keep your wrist stable until your follow-up appointment.  It may take weeks or months for your wrist to heal. You can help it heal with care at home. You heal best when you take good care of yourself. Eat a variety of healthy foods, and don't smoke. Follow-up care is a key part of your treatment and safety. Be sure to make and go to all appointments, and call your doctor if you are having problems. It's also a good idea to know your test results and keep a list of the medicines you take. How can you care for yourself at home? · Put ice or a cold pack on your wrist for 10 to 20 minutes at a time. Try to do this every 1 to 2 hours for the next 3 days (when you are awake). Put a thin cloth between the ice and your cast or splint. Keep your cast or splint dry. · Follow the splint or cast care instructions your doctor gives you. If you have a splint, do not take it off unless your doctor tells you to. Be careful not to put the splint on too tight. · Be safe with medicines. Take pain medicines exactly as directed. ¨ If the doctor gave you a prescription medicine for pain, take it as prescribed. ¨ If you are not taking a prescription pain medicine, ask your doctor if you can take an over-the-counter medicine. · Prop up your wrist on pillows when you sit or lie down in the first few days after the injury. Keep your wrist higher than the level of your heart. This will help reduce swelling. · Move your fingers often to reduce swelling and stiffness, but do not use that hand to grab or carry anything. · Follow instructions for exercises to keep your arm strong. When should you call for help? Call your doctor now or seek immediate medical care if: 
· You have increased or severe pain. · Your cast or splint feels too tight. · You cannot move your fingers. · You have tingling, weakness, or numbness in your hand and fingers. · Your hand and fingers are cool or pale or change color. · You have a lot of swelling near your cast or splint. · The skin under your cast or splint is burning or stinging. Watch closely for changes in your health, and be sure to contact your doctor if: 
· You do not get better as expected. Where can you learn more? Go to http://ammy-landon.info/. Enter [id number] in the search box to learn more about \"Broken Wrist: Care Instructions. \" Current as of: May 23, 2016 Content Version: 11.1 © 7519-9595 [employer]â„¢. Care instructions adapted under license by [employer] (which disclaims liability or warranty for this information). If you have questions about a medical condition or this instruction, always ask your healthcare professional. [address] any warranty or liability for your use of this information. Introducing [facility]! Dear Mrailu Mckeon: 
Thank you for requesting a [employer] account. Our records indicate that you already have an active [employer] account. You can access your account anytime at [email or web address] Did you know that you can access your hospital and ER discharge instructions at any time in [employer]? You can also review all of your test results from your hospital stay or ER visit. Additional Information If you have questions, please visit the Frequently Asked Questions section of the [employer] website at [email or web address] Remember, [employer] is NOT to be used for urgent needs. For medical emergencies, dial 911. Now available from your iPhone and Android! Please provide this summary of care documentation to your next provider. Your primary care clinician is listed as [address] . If you have any questions after today's visit, please call [phone].

## 2017-02-01 ENCOUNTER — TELEPHONE (OUTPATIENT)
Dept: ORTHOPEDIC SURGERY | Age: 77
End: 2017-02-01

## 2017-02-01 RX ORDER — POTASSIUM CHLORIDE 20 MEQ/1
20 TABLET, EXTENDED RELEASE ORAL 2 TIMES DAILY
Qty: 2 TAB | Refills: 0 | Status: SHIPPED | OUTPATIENT
Start: 2017-02-01 | End: 2017-02-03

## 2017-02-01 NOTE — TELEPHONE ENCOUNTER
Please call patient and let her know that I sent a potassium pill to her pharmacy for her to take today. Her potassium was a little low at her labs and i would like her to take this as directed today only.  [facility] [address]

## 2017-02-06 ENCOUNTER — HOSPITAL ENCOUNTER (EMERGENCY)
Age: 77
Discharge: LEFT BEFORE DISCHARGE | End: 2017-02-06
Attending: EMERGENCY MEDICINE
Payer: MEDICARE

## 2017-02-06 ENCOUNTER — TELEPHONE (OUTPATIENT)
Dept: ORTHOPEDIC SURGERY | Age: 77
End: 2017-02-06

## 2017-02-06 PROCEDURE — 75810000275 HC EMERGENCY DEPT VISIT NO LEVEL OF CARE

## 2017-02-06 NOTE — TELEPHONE ENCOUNTER
Patient is scheduled for her orif left distal radius on this Wed. 2/8/17. She wanted us to be aware that she may have a canker sore inside her mouth. She has called her pcp but has not heard back from him yet. She would like a call back to let her know if this will affect her planned surgery.

## 2017-02-07 ENCOUNTER — ANESTHESIA EVENT (OUTPATIENT)
Dept: SURGERY | Age: 77
End: 2017-02-07
Payer: MEDICARE

## 2017-02-08 ENCOUNTER — APPOINTMENT (OUTPATIENT)
Dept: GENERAL RADIOLOGY | Age: 77
End: 2017-02-08
Attending: ORTHOPAEDIC SURGERY
Payer: MEDICARE

## 2017-02-08 ENCOUNTER — HOSPITAL ENCOUNTER (OUTPATIENT)
Age: 77
Setting detail: OUTPATIENT SURGERY
Discharge: HOME | End: 2017-02-08
Attending: ORTHOPAEDIC SURGERY | Admitting: ORTHOPAEDIC SURGERY
Payer: MEDICARE

## 2017-02-08 ENCOUNTER — ANESTHESIA (OUTPATIENT)
Dept: SURGERY | Age: 77
End: 2017-02-08
Payer: MEDICARE

## 2017-02-08 VITALS
WEIGHT: 185 LBS | TEMPERATURE: 97.4 F | HEIGHT: 63 IN | HEART RATE: 100 BPM | SYSTOLIC BLOOD PRESSURE: 124 MMHG | DIASTOLIC BLOOD PRESSURE: 68 MMHG | OXYGEN SATURATION: 92 % | BODY MASS INDEX: 32.78 KG/M2 | RESPIRATION RATE: 16 BRPM

## 2017-02-08 PROCEDURE — C1713 ANCHOR/SCREW BN/BN,TIS/BN: HCPCS | Performed by: ORTHOPAEDIC SURGERY

## 2017-02-08 PROCEDURE — 77030033263 HC DRSG MEPILEX 16-48IN BORD MOLN -B: Performed by: ORTHOPAEDIC SURGERY

## 2017-02-08 PROCEDURE — 74011250636 HC RX REV CODE- 250/636: Performed by: PHYSICIAN ASSISTANT

## 2017-02-08 PROCEDURE — 74011250636 HC RX REV CODE- 250/636

## 2017-02-08 PROCEDURE — 77030032490 HC SLV COMPR SCD KNE COVD -B: Performed by: ORTHOPAEDIC SURGERY

## 2017-02-08 PROCEDURE — 74011250637 HC RX REV CODE- 250/637: Performed by: NURSE ANESTHETIST, CERTIFIED REGISTERED

## 2017-02-08 PROCEDURE — 77030018846 HC SOL IRR STRL H20 ICUM -A: Performed by: ORTHOPAEDIC SURGERY

## 2017-02-08 PROCEDURE — 77030003666 HC NDL SPINAL BD -A: Performed by: ORTHOPAEDIC SURGERY

## 2017-02-08 PROCEDURE — 77030020782 HC GWN BAIR PAWS FLX 3M -B: Performed by: ORTHOPAEDIC SURGERY

## 2017-02-08 PROCEDURE — 77030018836 HC SOL IRR NACL ICUM -A: Performed by: ORTHOPAEDIC SURGERY

## 2017-02-08 PROCEDURE — 77030012012 HC AIRWY OP SFT TELE -A: Performed by: NURSE ANESTHETIST, CERTIFIED REGISTERED

## 2017-02-08 PROCEDURE — 76060000034 HC ANESTHESIA 1.5 TO 2 HR: Performed by: ORTHOPAEDIC SURGERY

## 2017-02-08 PROCEDURE — 76942 ECHO GUIDE FOR BIOPSY: CPT | Performed by: ANESTHESIOLOGY

## 2017-02-08 PROCEDURE — 77030025097 HC BIT DRL ACUTRK 1 ACMD -C: Performed by: ORTHOPAEDIC SURGERY

## 2017-02-08 PROCEDURE — 76010000153 HC OR TIME 1.5 TO 2 HR: Performed by: ORTHOPAEDIC SURGERY

## 2017-02-08 PROCEDURE — 77030002933 HC SUT MCRYL J&J -A: Performed by: ORTHOPAEDIC SURGERY

## 2017-02-08 PROCEDURE — 77030008829 HC WRE K ACMD -B: Performed by: ORTHOPAEDIC SURGERY

## 2017-02-08 PROCEDURE — 77030003737 HC BIT DRL ACMD -C: Performed by: ORTHOPAEDIC SURGERY

## 2017-02-08 PROCEDURE — 77030010509 HC AIRWY LMA MSK TELE -A: Performed by: NURSE ANESTHETIST, CERTIFIED REGISTERED

## 2017-02-08 PROCEDURE — 74011250636 HC RX REV CODE- 250/636: Performed by: NURSE ANESTHETIST, CERTIFIED REGISTERED

## 2017-02-08 PROCEDURE — 64415 NJX AA&/STRD BRCH PLXS IMG: CPT | Performed by: ANESTHESIOLOGY

## 2017-02-08 PROCEDURE — 77030025832 HC PLT RAD DSTL ACMD -G1: Performed by: ORTHOPAEDIC SURGERY

## 2017-02-08 PROCEDURE — 76210000021 HC REC RM PH II 0.5 TO 1 HR: Performed by: ORTHOPAEDIC SURGERY

## 2017-02-08 PROCEDURE — 77030008467 HC STPLR SKN COVD -B: Performed by: ORTHOPAEDIC SURGERY

## 2017-02-08 PROCEDURE — 73100 X-RAY EXAM OF WRIST: CPT

## 2017-02-08 PROCEDURE — 74011000250 HC RX REV CODE- 250

## 2017-02-08 PROCEDURE — 76210000016 HC OR PH I REC 1 TO 1.5 HR: Performed by: ORTHOPAEDIC SURGERY

## 2017-02-08 PROCEDURE — 77030011640 HC PAD GRND REM COVD -A: Performed by: ORTHOPAEDIC SURGERY

## 2017-02-08 PROCEDURE — 77030031139 HC SUT VCRL2 J&J -A: Performed by: ORTHOPAEDIC SURGERY

## 2017-02-08 DEVICE — 2.3MM X 20MM LOCKING CORTICAL SCREW
Type: IMPLANTABLE DEVICE | Site: WRIST | Status: FUNCTIONAL
Brand: ACUMED

## 2017-02-08 DEVICE — 3.5MM X 12MM NON-LOCKING HEXALOBE SCREW
Type: IMPLANTABLE DEVICE | Site: WRIST | Status: FUNCTIONAL
Brand: ACUMED

## 2017-02-08 DEVICE — 3.5MM X 10MM NON-LOCKING HEXALOBE SCREW
Type: IMPLANTABLE DEVICE | Site: WRIST | Status: FUNCTIONAL
Brand: ACUMED

## 2017-02-08 DEVICE — 2.3MM X 18MM LOCKING CORTICAL SCREW
Type: IMPLANTABLE DEVICE | Site: WRIST | Status: FUNCTIONAL
Brand: ACUMED

## 2017-02-08 DEVICE — ACU-LOC® 2 VDR PLT, NARROW, L
Type: IMPLANTABLE DEVICE | Site: WRIST | Status: FUNCTIONAL
Brand: ACUMED

## 2017-02-08 DEVICE — 2.3MM X 16MM LOCKING CORTICAL SCREW
Type: IMPLANTABLE DEVICE | Site: WRIST | Status: FUNCTIONAL
Brand: ACUMED

## 2017-02-08 RX ORDER — EPHEDRINE SULFATE/0.9% NACL/PF 25 MG/5 ML
SYRINGE (ML) INTRAVENOUS AS NEEDED
Status: DISCONTINUED | OUTPATIENT
Start: 2017-02-08 | End: 2017-02-08 | Stop reason: HOSPADM

## 2017-02-08 RX ORDER — SODIUM CHLORIDE, SODIUM LACTATE, POTASSIUM CHLORIDE, CALCIUM CHLORIDE 600; 310; 30; 20 MG/100ML; MG/100ML; MG/100ML; MG/100ML
75 INJECTION, SOLUTION INTRAVENOUS CONTINUOUS
Status: DISCONTINUED | OUTPATIENT
Start: 2017-02-08 | End: 2017-02-08 | Stop reason: HOSPADM

## 2017-02-08 RX ORDER — SODIUM CHLORIDE 0.9 % (FLUSH) 0.9 %
5-10 SYRINGE (ML) INJECTION AS NEEDED
Status: DISCONTINUED | OUTPATIENT
Start: 2017-02-08 | End: 2017-02-08 | Stop reason: HOSPADM

## 2017-02-08 RX ORDER — MIDAZOLAM HYDROCHLORIDE 1 MG/ML
2 INJECTION, SOLUTION INTRAMUSCULAR; INTRAVENOUS ONCE
Status: DISCONTINUED | OUTPATIENT
Start: 2017-02-08 | End: 2017-02-08

## 2017-02-08 RX ORDER — ROPIVACAINE HYDROCHLORIDE 5 MG/ML
30 INJECTION, SOLUTION EPIDURAL; INFILTRATION; PERINEURAL
Status: DISCONTINUED | OUTPATIENT
Start: 2017-02-08 | End: 2017-02-08

## 2017-02-08 RX ORDER — INSULIN LISPRO 100 [IU]/ML
INJECTION, SOLUTION INTRAVENOUS; SUBCUTANEOUS ONCE
Status: DISCONTINUED | OUTPATIENT
Start: 2017-02-08 | End: 2017-02-08 | Stop reason: HOSPADM

## 2017-02-08 RX ORDER — DEXAMETHASONE SODIUM PHOSPHATE 4 MG/ML
INJECTION, SOLUTION INTRA-ARTICULAR; INTRALESIONAL; INTRAMUSCULAR; INTRAVENOUS; SOFT TISSUE AS NEEDED
Status: DISCONTINUED | OUTPATIENT
Start: 2017-02-08 | End: 2017-02-08 | Stop reason: HOSPADM

## 2017-02-08 RX ORDER — SODIUM CHLORIDE 0.9 % (FLUSH) 0.9 %
5-10 SYRINGE (ML) INJECTION EVERY 8 HOURS
Status: DISCONTINUED | OUTPATIENT
Start: 2017-02-08 | End: 2017-02-08 | Stop reason: HOSPADM

## 2017-02-08 RX ORDER — HYDROMORPHONE HYDROCHLORIDE 2 MG/ML
0.5 INJECTION, SOLUTION INTRAMUSCULAR; INTRAVENOUS; SUBCUTANEOUS
Status: DISCONTINUED | OUTPATIENT
Start: 2017-02-08 | End: 2017-02-08 | Stop reason: HOSPADM

## 2017-02-08 RX ORDER — FENTANYL CITRATE 50 UG/ML
100 INJECTION, SOLUTION INTRAMUSCULAR; INTRAVENOUS ONCE
Status: DISCONTINUED | OUTPATIENT
Start: 2017-02-08 | End: 2017-02-08

## 2017-02-08 RX ORDER — PROPOFOL 10 MG/ML
INJECTION, EMULSION INTRAVENOUS AS NEEDED
Status: DISCONTINUED | OUTPATIENT
Start: 2017-02-08 | End: 2017-02-08 | Stop reason: HOSPADM

## 2017-02-08 RX ORDER — FAMOTIDINE 20 MG/1
20 TABLET, FILM COATED ORAL ONCE
Status: COMPLETED | OUTPATIENT
Start: 2017-02-08 | End: 2017-02-08

## 2017-02-08 RX ORDER — MIDAZOLAM HYDROCHLORIDE 1 MG/ML
INJECTION, SOLUTION INTRAMUSCULAR; INTRAVENOUS
Status: COMPLETED
Start: 2017-02-08 | End: 2017-02-08

## 2017-02-08 RX ORDER — FAMOTIDINE 20 MG/1
20 TABLET, FILM COATED ORAL ONCE
Status: DISCONTINUED | OUTPATIENT
Start: 2017-02-08 | End: 2017-02-08 | Stop reason: HOSPADM

## 2017-02-08 RX ORDER — ROPIVACAINE HYDROCHLORIDE 5 MG/ML
INJECTION, SOLUTION EPIDURAL; INFILTRATION; PERINEURAL
Status: COMPLETED
Start: 2017-02-08 | End: 2017-02-08

## 2017-02-08 RX ORDER — FENTANYL CITRATE 50 UG/ML
100 INJECTION, SOLUTION INTRAMUSCULAR; INTRAVENOUS ONCE
Status: COMPLETED | OUTPATIENT
Start: 2017-02-08 | End: 2017-02-08

## 2017-02-08 RX ORDER — ONDANSETRON 2 MG/ML
INJECTION INTRAMUSCULAR; INTRAVENOUS AS NEEDED
Status: DISCONTINUED | OUTPATIENT
Start: 2017-02-08 | End: 2017-02-08 | Stop reason: HOSPADM

## 2017-02-08 RX ORDER — MIDAZOLAM HYDROCHLORIDE 1 MG/ML
2 INJECTION, SOLUTION INTRAMUSCULAR; INTRAVENOUS ONCE
Status: COMPLETED | OUTPATIENT
Start: 2017-02-08 | End: 2017-02-08

## 2017-02-08 RX ORDER — FENTANYL CITRATE 50 UG/ML
INJECTION, SOLUTION INTRAMUSCULAR; INTRAVENOUS
Status: COMPLETED
Start: 2017-02-08 | End: 2017-02-08

## 2017-02-08 RX ORDER — LIDOCAINE HYDROCHLORIDE 20 MG/ML
INJECTION, SOLUTION EPIDURAL; INFILTRATION; INTRACAUDAL; PERINEURAL AS NEEDED
Status: DISCONTINUED | OUTPATIENT
Start: 2017-02-08 | End: 2017-02-08 | Stop reason: HOSPADM

## 2017-02-08 RX ORDER — ROPIVACAINE HYDROCHLORIDE 5 MG/ML
30 INJECTION, SOLUTION EPIDURAL; INFILTRATION; PERINEURAL
Status: DISCONTINUED | OUTPATIENT
Start: 2017-02-08 | End: 2017-02-08 | Stop reason: HOSPADM

## 2017-02-08 RX ORDER — CEFAZOLIN SODIUM 2 G/50ML
2 SOLUTION INTRAVENOUS ONCE
Status: COMPLETED | OUTPATIENT
Start: 2017-02-08 | End: 2017-02-08

## 2017-02-08 RX ORDER — TRIAMCINOLONE ACETONIDE 1 MG/G
OINTMENT TOPICAL
COMMUNITY
End: 2022-01-30

## 2017-02-08 RX ADMIN — FENTANYL CITRATE 100 MCG: 50 INJECTION INTRAMUSCULAR; INTRAVENOUS at 08:56

## 2017-02-08 RX ADMIN — FAMOTIDINE 20 MG: 20 TABLET ORAL at 08:37

## 2017-02-08 RX ADMIN — MIDAZOLAM HYDROCHLORIDE 2 MG: 1 INJECTION, SOLUTION INTRAMUSCULAR; INTRAVENOUS at 08:56

## 2017-02-08 RX ADMIN — ROPIVACAINE HYDROCHLORIDE: 5 INJECTION, SOLUTION EPIDURAL; INFILTRATION; PERINEURAL at 09:02

## 2017-02-08 RX ADMIN — SODIUM CHLORIDE, SODIUM LACTATE, POTASSIUM CHLORIDE, AND CALCIUM CHLORIDE 75 ML/HR: 600; 310; 30; 20 INJECTION, SOLUTION INTRAVENOUS at 08:19

## 2017-02-08 RX ADMIN — ONDANSETRON 4 MG: 2 INJECTION INTRAMUSCULAR; INTRAVENOUS at 10:55

## 2017-02-08 RX ADMIN — FENTANYL CITRATE 100 MCG: 50 INJECTION, SOLUTION INTRAMUSCULAR; INTRAVENOUS at 08:56

## 2017-02-08 RX ADMIN — CEFAZOLIN SODIUM 2 G: 2 SOLUTION INTRAVENOUS at 09:49

## 2017-02-08 RX ADMIN — Medication 5 MG: at 10:17

## 2017-02-08 RX ADMIN — Medication 5 MG: at 10:01

## 2017-02-08 RX ADMIN — PROPOFOL 150 MG: 10 INJECTION, EMULSION INTRAVENOUS at 09:56

## 2017-02-08 RX ADMIN — LIDOCAINE HYDROCHLORIDE 40 MG: 20 INJECTION, SOLUTION EPIDURAL; INFILTRATION; INTRACAUDAL; PERINEURAL at 09:56

## 2017-02-08 RX ADMIN — SODIUM CHLORIDE, SODIUM LACTATE, POTASSIUM CHLORIDE, AND CALCIUM CHLORIDE: 600; 310; 30; 20 INJECTION, SOLUTION INTRAVENOUS at 09:49

## 2017-02-08 RX ADMIN — DEXAMETHASONE SODIUM PHOSPHATE 4 MG: 4 INJECTION, SOLUTION INTRA-ARTICULAR; INTRALESIONAL; INTRAMUSCULAR; INTRAVENOUS; SOFT TISSUE at 11:15

## 2017-02-08 NOTE — ANESTHESIA PREPROCEDURE EVALUATION
Anesthetic History               Review of Systems / Medical History  Patient summary reviewed, nursing notes reviewed and pertinent labs reviewed    Pulmonary        Sleep apnea: CPAP           Neuro/Psych   Within defined limits           Cardiovascular    Hypertension: well controlled        Dysrhythmias : atrial fibrillation      Exercise tolerance: >4 METS     GI/Hepatic/Renal     GERD: well controlled           Endo/Other        Obesity and arthritis     Other Findings   Comments: Current Smoker? NO       Elective Surgery? Yes       Abstained from smoking 24 hours prior to anesthesia? N/A    Risk Factors for Postoperative nausea/vomiting:       History of postoperative nausea/vomiting? NO       Female? YES       Motion sickness? NO       Intended opioid administration for postoperative analgesia?   NO           Physical Exam    Airway  Mallampati: III  TM Distance: 4 - 6 cm  Neck ROM: short neck   Mouth opening: Diminished (comment)     Cardiovascular  Regular rate and rhythm,  S1 and S2 normal,  no murmur, click, rub, or gallop             Dental    Dentition: Upper partial plate and Poor dentition     Pulmonary  Breath sounds clear to auscultation               Abdominal  GI exam deferred       Other Findings            Anesthetic Plan    ASA: 3  Anesthesia type: general      Post-op pain plan if not by surgeon: peripheral nerve block single      Anesthetic plan and risks discussed with: Patient

## 2017-02-08 NOTE — OP NOTES
[address]    Name:  Jody Griffin  MR#:  [id number]  :  1940  Account #:  [de-identified]  Date of Adm:  2017  Date of Surgery:  2017      PREOPERATIVE DIAGNOSIS: Distal radius fracture, intraarticular, left  wrist.    POSTOPERATIVE DIAGNOSIS: Distal radius fracture, intraarticular,  left wrist.    PROCEDURES PERFORMED: Open reduction internal fixation distal  radius fracture, 3 fragments, left wrist.    ANESTHESIA:  general    SURGEON: Aakash Urbano MD    ESTIMATED BLOOD LOSS: 20 mL. COMPLICATIONS: None. SPECIMENS REMOVED: None. FINDINGS: As above. BRIEF HISTORY: The patient has had significant amount of problems  with the left wrist, no response to conservative treatment, and was  consented for surgery after having discussed at length possible risks  and complications of surgery including infection, bleeding, recurrence  of pain, among other possible problems. PROCEDURE IN DETAIL: The patient was taken to the operating  room, induced under general endotracheal anesthesia by the  anesthesia staff, placed in a standard arthroscopy abebe. The left arm  was placed in a standard supine position. The left arm was prepped  with DuraPrep solution and draped as a free sterile field. A volar  approach was used with an 8 cm incision over the flexor carpi radialis. Subcutaneous tissues dissected sharply to the level of the fcr, which was  incised in line with the incision, incision being 7 cm. The fascia was  then split and the neurovascular bundle retracted radially. Musculature  retracted ulnarly, exposing the pronator, which was then elevated  subperiosteally exposing the fracture site. There was a displaced distal  radius fracture with an intraarticular component, which was not  displaced. All 3 fragments were reduced.  Hematoma was cleansed  and a narrow distal volar plate from [place] was then placed; 4  locking screws in the distal row, 2 radial styloid locking screws as well,  and 1 second row ulnar screw. Then, the plate brought to the shaft,  recreating the volar tilt and stabilized with 6 cortices. A very stable  construct was achieved in this manner. Irrigation was then used. The  pronator muscle was reattached using 3-0 Vicryl, subcutaneous  tissues with 2-0 Vicryl and the skin with 4-0 Monocryl. Sterile dressings  were applied. The patient tolerated the procedure well and was taken  to recovery room without problems.         MD BRO Espinoza / Aston.Sourav  D:  02/08/2017   11:17  T:  02/08/2017   13:41  Job #:  [id number]

## 2017-02-08 NOTE — PROGRESS NOTES
Date of Surgery Update:  Malcom Vizcaino was seen and examined. History and physical has been reviewed. The patient has been examined.  There have been no significant clinical changes since the completion of the originally dated History and Physical.    Signed By: Santosh Mar MD     February 8, 2017 9:11 AM

## 2017-02-08 NOTE — ANESTHESIA PROCEDURE NOTES
Peripheral Block    Start time: 2/8/2017 8:55 AM  End time: 2/8/2017 9:06 AM  Performed by: Alcides Fitzpatrick by: Jena Andino       Pre-procedure:    Indications: at surgeon's request and post-op pain management    Preanesthetic Checklist: patient identified, risks and benefits discussed, site marked, timeout performed, anesthesia consent given and patient being monitored    Timeout Time: 08:55          Block Type:   Block Type:  Infraclavicular  Laterality:  Left  Monitoring:  Standard ASA monitoring, responsive to questions, oxygen, continuous pulse ox, frequent vital sign checks and heart rate  Injection Technique:  Single shot  Procedures: ultrasound guided and nerve stimulator    Patient Position: supine  Prep: chlorhexidine    Needle Type:  Stimuplex  Needle Gauge:  20 G  Needle Localization:  Nerve stimulator and ultrasound guidance  Motor Response: minimal motor response >0.4 mA    Medication Injected:  0.5%  ropivacaine  Volume (mL):  30    Assessment:  Number of attempts:  1  Injection Assessment:  Incremental injection every 5 mL, negative aspiration for CSF, no paresthesia, ultrasound image on chart, local visualized surrounding nerve on ultrasound, negative aspiration for blood and no intravascular symptoms  Patient tolerance:  Patient tolerated the procedure well with no immediate complications

## 2017-02-08 NOTE — BRIEF OP NOTE
BRIEF OPERATIVE NOTE    Date of Procedure: 2/8/2017   Preoperative Diagnosis: Other closed intra-articular fracture of distal end of left radius [S52.571A]  Postoperative Diagnosis: Other closed intra-articular fracture of distal end of left radius [S52.571A]    Procedure(s):  OPEN REDUCTION INTERNAL FIXATION LEFT DISTAL RADIUS  Surgeon(s) and Role:     * Margo Clayton MD - Primary            Surgical Staff:  Circ-1: María Shipley RN  Radiology Technician: Eliza Deluca  Scrub Tech-1: Prudence Pillion  Surg Asst-1: Lang Ma  Event Time In   Incision Start 1014   Incision Close      Anesthesia: General   Estimated Blood Loss: 20cc  Specimens: * No specimens in log *   Findings: as above   Complications: none  Implants: * No implants in log *

## 2017-02-08 NOTE — DISCHARGE INSTRUCTIONS
Dr. Prince Dan  Postoperative Information      You will be given a prescription for pain medication. It may be taken every 4-6 hours as needed for pain for the first 4-5 days. A bandage was placed on your incision after surgery. You need to keep this incision clean and dry. If you have a splint or cast on please keep this clean and dry as well. The operated area may be sore and develop bruising over the next several days. You should expect swelling in the area. You may elevate the operated extremity and apply an icepack on top of the dressing to help minimize the swelling. It is safe to take a shower two days after surgery but you must keep the operated area dry. If you have a high temperature, unexpected pain, redness or swelling, or any drainage around your operated area, please call my office immediately. Please make an appointment to return to my office in one week. Dr. Albarran Patient office number [phone]      DISCHARGE SUMMARY from Nurse    The following personal items are in your possession at time of discharge:    Dental Appliances: Uppers, Partials        Home Medications: None  Jewelry: None  Clothing: Pants, Shirt, Undergarments, Socks, Footwear  Other Valuables: Cane             PATIENT INSTRUCTIONS:    After general anesthesia or intravenous sedation, for 24 hours or while taking prescription Narcotics:  · Limit your activities  · Do not drive and operate hazardous machinery  · Do not make important personal or business decisions  · Do  not drink alcoholic beverages  · If you have not urinated within 8 hours after discharge, please contact your surgeon on call.     Report the following to your surgeon:  · Excessive pain, swelling, redness or odor of or around the surgical area  · Temperature over 100.5  · Nausea and vomiting lasting longer than 4 hours or if unable to take medications  · Any signs of decreased circulation or nerve impairment to extremity: change in color, persistent  numbness, tingling, coldness or increase pain  · Any questions        What to do at Home:  Recommended activity: Activity as tolerated      *  Please give a list of your current medications to your Primary Care Provider. *  Please update this list whenever your medications are discontinued, doses are      changed, or new medications (including over-the-counter products) are added. *  Please carry medication information at all times in case of emergency situations. These are general instructions for a healthy lifestyle:    No smoking/ No tobacco products/ Avoid exposure to second hand smoke    Surgeon General's Warning:  Quitting smoking now greatly reduces serious risk to your health. Obesity, smoking, and sedentary lifestyle greatly increases your risk for illness    A healthy diet, regular physical exercise & weight monitoring are important for maintaining a healthy lifestyle    You may be retaining fluid if you have a history of heart failure or if you experience any of the following symptoms:  Weight gain of 3 pounds or more overnight or 5 pounds in a week, increased swelling in our hands or feet or shortness of breath while lying flat in bed. Please call your doctor as soon as you notice any of these symptoms; do not wait until your next office visit. Recognize signs and symptoms of STROKE:    F-face looks uneven    A-arms unable to move or move unevenly    S-speech slurred or non-existent    T-time-call 911 as soon as signs and symptoms begin-DO NOT go       Back to bed or wait to see if you get better-TIME IS BRAIN. Warning Signs of HEART ATTACK     Call 911 if you have these symptoms:   Chest discomfort. Most heart attacks involve discomfort in the center of the chest that lasts more than a few minutes, or that goes away and comes back. It can feel like uncomfortable pressure, squeezing, fullness, or pain.  Discomfort in other areas of the upper body.  Symptoms can include pain or discomfort in one or both arms, the back, neck, jaw, or stomach.  Shortness of breath with or without chest discomfort.  Other signs may include breaking out in a cold sweat, nausea, or lightheadedness. Don't wait more than five minutes to call 911 - MINUTES MATTER! Fast action can save your life. Calling 911 is almost always the fastest way to get lifesaving treatment. Emergency Medical Services staff can begin treatment when they arrive -- up to an hour sooner than if someone gets to the hospital by car. The discharge information has been reviewed with the patient and spouse. The patient and spouse verbalized understanding. Discharge medications reviewed with the patient and spouse and appropriate educational materials and side effects teaching were provided. IJJ CORPhart Activation    Thank you for enrolling in [facility]. Please follow the instructions below to securely access your online medical record. RecordSled allows you to send messages to your doctor, view your test results, renew your prescriptions, schedule appointments, and more. How Do I Sign Up? 1. In your internet browser, go to [email or web address]. 2. Click on the First Time User? Click Here link in the Sign In box. You will see the New Member Sign Up page. 3. Enter your RecordSled Access Code exactly as it appears below. You will not need to use this code after youve completed the sign-up process. If you do not sign up before the expiration date, you must request a new code. RecordSled Access Code: Activation code not generated  Current RecordSled Status: Active     4. Enter the last four digits of your Social Security Number (xxxx) and Date of Birth (mm/dd/yyyy) as indicated and click Submit. You will be taken to the next sign-up page. 5. Create a IJJ CORPhart ID. This will be your RecordSled login ID and cannot be changed, so think of one that is secure and easy to remember.   6. Create a Lifetablet password. You can change your password at any time. 7. Enter your Password Reset Question and Answer. This can be used at a later time if you forget your password. 8. Enter your e-mail address. You will receive e-mail notification when new information is available in [address] 9. Click Sign Up. You can now view your medical record. Additional Information    Remember, IDInteract is NOT to be used for urgent needs. For medical emergencies, dial 911. Now available from your iPhone and Android!

## 2017-02-08 NOTE — ANESTHESIA POSTPROCEDURE EVALUATION
Post-Anesthesia Evaluation and Assessment    Patient: Raúl Helton MRN: [id number]  SSN: [id number]    YOB: 1940  Age: 68 y.o. Sex: female       Cardiovascular Function/Vital Signs  Visit Vitals    /68    Pulse 100    Temp 36.3 °C (97.4 °F)    Resp 16    Ht 5' 3\" (1.6 m)    Wt 83.9 kg (185 lb)    SpO2 92%    BMI 32.77 kg/m2       Patient is status post general anesthesia for Procedure(s):  OPEN REDUCTION INTERNAL FIXATION LEFT DISTAL RADIUS. Nausea/Vomiting: None    Postoperative hydration reviewed and adequate. Pain:  Pain Scale 1: Numeric (0 - 10) (02/08/17 1132)  Pain Intensity 1: 0 (02/08/17 1132)   Managed    Neurological Status:   Neuro (WDL): Within Defined Limits (02/08/17 1132)   At baseline    Mental Status and Level of Consciousness: Arousable    Pulmonary Status:   O2 Device: CO2 nasal cannula (02/08/17 1132)   Adequate oxygenation and airway patent    Complications related to anesthesia: None    Post-anesthesia assessment completed.  No concerns      Signed By: Bhupendra Sampson MD     February 8, 2017

## 2017-02-08 NOTE — IP AVS SNAPSHOT
Rene Dominguez 
 
 
 [address] Patient: Chintan Ewing MRN: [id number] WPK:[birth year] You are allergic to the following Allergen Reactions Neurontin (Gabapentin) Other (comments) Recent Documentation Height Weight BMI OB Status Smoking Status 1.6 m 83.9 kg 32.77 kg/m2 Postmenopausal Former Smoker Emergency Contacts Name Discharge Info Relation Home Work Mobile Zain Rosen Sr DISCHARGE CAREGIVER [3] Spouse [3] [phone] About your hospitalization You were admitted on:  February 8, 2017 You last received care in the:  [facility] You were discharged on:  February 8, 2017 Unit phone number:  [phone] Why you were hospitalized Your primary diagnosis was:  Not on File Providers Seen During Your Hospitalizations Provider Role Specialty Primary office phone Kendra Matthews MD Attending Provider Orthopedic Surgery [phone] Your Primary Care Physician (PCP) Primary Care Physician Office Phone Office Fax Giacomo Cargo [phone] Follow-up Information Follow up With Details Comments Contact Info Shelly Pulliam MD   [address] [place] [address] 
[phone] Kendra Matthews MD Follow up in 1 week(s)  [address] [facility] [address] [facility] 
[phone] Your Appointments Thursday February 09, 2017 10:15 AM EST  
POST OP with GURJIT Escalona  
[facility] ([address]) [address] [place] [address], [address] [facility]  
[phone] Current Discharge Medication List  
  
CONTINUE these medications which have NOT CHANGED Dose & Instructions Dispensing Information Comments Morning Noon Evening Bedtime  
 atorvastatin 10 mg tablet Commonly known as:  LIPITOR Your next dose is: Today, Tomorrow Other:  _________ Take  by mouth daily. Refills:  0 Calcium-Cholecalciferol (D3) 600 mg(1,500mg) -400 unit Cap Your next dose is: Today, Tomorrow Other:  _________ Take  by mouth. Refills:  0 CARDIZEM 120 mg tablet Generic drug:  dilTIAZem Your next dose is: Today, Tomorrow Other:  _________ Dose:  120 mg Take 120 mg by mouth daily. Refills:  0 HYDROcodone-acetaminophen  mg tablet Commonly known as:  Sahil Bent Your next dose is: Today, Tomorrow Other:  _________ Dose:  1 Tab Take 1 Tab by mouth every six (6) hours as needed for Pain for up to 30 days. Max Daily Amount: 4 Tabs. May take an additional tablet on bad days not to exceed #135 per month. Indications: PAIN Quantity:  135 Tab Refills:  0  
     
   
   
   
  
 LATUDA 20 mg Tab tablet Generic drug:  lurasidone Your next dose is: Today, Tomorrow Other:  _________ Take  by mouth. Refills:  0  
     
   
   
   
  
 levorphanol 2 mg tablet Commonly known as:  LEVO-DROMORAN Your next dose is: Today, Tomorrow Other:  _________ Dose:  2 mg Take 1 Tab by mouth every eight (8) hours as needed. Max Daily Amount: 6 mg. OK to fill 9/2/15. Rx for Zohydro was too expensive and not filled Quantity:  90 Tab Refills:  0  
     
   
   
   
  
 morphine CR 30 mg CR tablet Commonly known as:  MS CONTIN Your next dose is: Today, Tomorrow Other:  _________ Dose:  30 mg Take 1 Tab by mouth every twelve (12) hours for 30 days. Max Daily Amount: 60 mg.  
 Quantity:  60 Tab Refills:  0  
     
   
   
   
  
 ondansetron 4 mg disintegrating tablet Commonly known as:  ZOFRAN ODT Your next dose is: Today, Tomorrow Other:  _________ Dose:  4 mg Take 4 mg by mouth every eight (8) hours as needed for Nausea. Indications: ACUTE GASTROENTERITIS-RELATED VOMITING IN PEDIATRICS Refills:  0  
     
   
   
   
  
 oxyCODONE-acetaminophen 5-325 mg per tablet Commonly known as:  PERCOCET Your next dose is: Today, Tomorrow Other:  _________ TAKE ONE TO TWO TABLETS PO Q 6 HRS AS NEEDED FOR PAIN **AFTER SURGERY** DO NOT TAKE BEFORE SURGERY  Indications: PAIN Quantity:  50 Tab Refills:  0  
     
   
   
   
  
 polyethylene glycol 17 gram packet Commonly known as:  Duane Greenbank Your next dose is: Today, Tomorrow Other:  _________ Dose:  17 g Take 1 Packet by mouth daily. Quantity:  10 Packet Refills:  1 PROLIA 60 mg/mL injection Generic drug:  denosumab Your next dose is: Today, Tomorrow Other:  _________ Dose:  60 mg  
60 mg by SubCUTAneous route. Refills:  0  
     
   
   
   
  
 promethazine 25 mg tablet Commonly known as:  PHENERGAN Your next dose is: Today, Tomorrow Other:  _________ Dose:  25 mg Take 1 Tab by mouth every six (6) hours as needed for Nausea. Quantity:  30 Tab Refills:  0  
     
   
   
   
  
 traZODone 50 mg tablet Commonly known as:  Orma Paddy Your next dose is: Today, Tomorrow Other:  _________ Dose:  150 mg Take 150 mg by mouth nightly. Indications: INSOMNIA Refills:  0  
     
   
   
   
  
 triamcinolone acetonide 0.1 % ointment Commonly known as:  KENALOG Your next dose is: Today, Tomorrow Other:  _________ Apply  to affected area. use thin layer Refills:  0 WELLBUTRIN  mg tablet Generic drug:  buPROPion XL Your next dose is: Today, Tomorrow Other:  _________ Dose:  300 mg Take 300 mg by mouth every morning. Refills:  0 XANAX 1 mg tablet Generic drug:  ALPRAZolam  
   
Your next dose is: Today, Tomorrow Other:  _________ Dose:  1 mg Take 1 mg by mouth as needed. Refills:  0 Discharge Instructions Dr. Mike Mo Postoperative Information You will be given a prescription for pain medication. It may be taken every 4-6 hours as needed for pain for the first 4-5 days. A bandage was placed on your incision after surgery. You need to keep this incision clean and dry. If you have a splint or cast on please keep this clean and dry as well. The operated area may be sore and develop bruising over the next several days. You should expect swelling in the area. You may elevate the operated extremity and apply an icepack on top of the dressing to help minimize the swelling. It is safe to take a shower two days after surgery but you must keep the operated area dry. If you have a high temperature, unexpected pain, redness or swelling, or any drainage around your operated area, please call my office immediately. Please make an appointment to return to my office in one week. Dr. Doe [facility] office number [phone] DISCHARGE SUMMARY from Nurse The following personal items are in your possession at time of discharge: 
 
Dental Appliances: Uppers, Partials Home Medications: None Jewelry: None Clothing: Pants, Shirt, Undergarments, Socks, Footwear Other Valuables: Constancia Grumbling PATIENT INSTRUCTIONS: 
 
 
F-face looks uneven A-arms unable to move or move unevenly S-speech slurred or non-existent T-time-call 911 as soon as signs and symptoms begin-DO NOT go Back to bed or wait to see if you get better-TIME IS BRAIN. Warning Signs of HEART ATTACK Call 911 if you have these symptoms: ? Chest discomfort. Most heart attacks involve discomfort in the center of the chest that lasts more than a few minutes, or that goes away and comes back. It can feel like uncomfortable pressure, squeezing, fullness, or pain. ? Discomfort in other areas of the upper body. Symptoms can include pain or discomfort in one or both arms, the back, neck, jaw, or stomach. ? Shortness of breath with or without chest discomfort. ? Other signs may include breaking out in a cold sweat, nausea, or lightheadedness. Don't wait more than five minutes to call [address]! Fast action can save your life. Calling 911 is almost always the fastest way to get lifesaving treatment. Emergency Medical Services staff can begin treatment when they arrive  up to an hour sooner than if someone gets to the hospital by car. The discharge information has been reviewed with the patient and spouse. The patient and spouse verbalized understanding. Discharge medications reviewed with the patient and spouse and appropriate educational materials and side effects teaching were provided. [employer] Activation Thank you for enrolling in [address] Please follow the instructions below to securely access your online medical record. Viddler allows you to send messages to your doctor, view your test results, renew your prescriptions, schedule appointments, and more. How Do I Sign Up? 1. In your internet browser, go to [email or web address]. 2. Click on the First Time User? Click Here link in the Sign In box. You will see the New Member Sign Up page. 3. Enter your Viddler Access Code exactly as it appears below. You will not need to use this code after youve completed the sign-up process. If you do not sign up before the expiration date, you must request a new code. Viddler Access Code: Activation code not generated Current Viddler Status: Active 4. Enter the last four digits of your Social Security Number (xxxx) and Date of Birth (mm/dd/yyyy) as indicated and click Submit. You will be taken to the next sign-up page. 5. Create a WorkMeIn ID. This will be your WorkMeIn login ID and cannot be changed, so think of one that is secure and easy to remember. 6. Create a WorkMeIn password. You can change your password at any time. 7. Enter your Password Reset Question and Answer. This can be used at a later time if you forget your password. 8. Enter your e-mail address. You will receive e-mail notification when new information is available in [address] 9. Click Sign Up. You can now view your medical record. Additional Information Remember, WorkMeIn is NOT to be used for urgent needs. For medical emergencies, dial 911. Now available from your iPhone and Android! Discharge Orders None Introducing [facility]! Dear Ravin Velasquez: 
Thank you for requesting a WorkMeIn account. Our records indicate that you already have an active WorkMeIn account. You can access your account anytime at [email or web address] Did you know that you can access your hospital and ER discharge instructions at any time in WorkMeIn? You can also review all of your test results from your hospital stay or ER visit. Additional Information If you have questions, please visit the Frequently Asked Questions section of the WorkMeIn website at [email or web address] Remember, WorkMeIn is NOT to be used for urgent needs. For medical emergencies, dial 911. Now available from your iPhone and Android! General Information Please provide this summary of care documentation to your next provider. Patient Signature:  ____________________________________________________________ Date:  ____________________________________________________________  
  
[facility]  Provider Signature: ____________________________________________________________ Date:  ____________________________________________________________

## 2017-02-15 ENCOUNTER — OFFICE VISIT (OUTPATIENT)
Dept: ORTHOPEDIC SURGERY | Age: 77
End: 2017-02-15

## 2017-02-15 VITALS
BODY MASS INDEX: 32.77 KG/M2 | TEMPERATURE: 98 F | SYSTOLIC BLOOD PRESSURE: 127 MMHG | WEIGHT: 185 LBS | HEART RATE: 70 BPM | DIASTOLIC BLOOD PRESSURE: 74 MMHG

## 2017-02-15 DIAGNOSIS — Z98.890 POST-OPERATIVE STATE: Primary | ICD-10-CM

## 2017-02-15 DIAGNOSIS — M25.532 LEFT WRIST PAIN: ICD-10-CM

## 2017-02-15 RX ORDER — AMOXICILLIN 250 MG
1 CAPSULE ORAL DAILY
Qty: 60 TAB | Refills: 0 | Status: SHIPPED | OUTPATIENT
Start: 2017-02-15 | End: 2022-01-30

## 2017-02-15 RX ORDER — OXYCODONE AND ACETAMINOPHEN 5; 325 MG/1; MG/1
1 TABLET ORAL
Qty: 40 TAB | Refills: 0 | Status: SHIPPED | OUTPATIENT
Start: 2017-02-15 | End: 2017-03-03 | Stop reason: ALTCHOICE

## 2017-02-15 NOTE — PROCEDURES
Three views of the left wrist reveal excellent position of the fracture site with a volar plate in excellent position.

## 2017-02-15 NOTE — PROGRESS NOTES
Josias Quinones  1940     HISTORY OF PRESENT ILLNESS  Josias Quinones is a 68 y.o. female who presents today for evaluation s/p ORIF left wrist on 2/8/17. She is doing ok. States pain is 8/10. Has been elevating arm. PHYSICAL EXAM:   Visit Vitals    /74 (BP 1 Location: Left arm, BP Patient Position: Sitting)    Pulse 70    Temp 98 °F (36.7 °C) (Oral)    Wt 185 lb (83.9 kg)    BMI 32.77 kg/m2      The patient is a well-developed, well-nourished female in no acute distress. The patient is alert and oriented times three. The patient appears to be well groomed. Mood and affect are normal.  ORTHOPEDIC EXAM of left wrist:  Inspection: mild swelling  Incision, clean, dry, intact, sutures in place  Range of motion: able to make full fist  ttp distal radius  Stability: Stable  Strength: n/a    IMAGING: 3 view xray of left wrist read by myself reveal hardware in excellent position  IMPRESSION:      ICD-10-CM ICD-9-CM    1. Post-operative state Z98.890 V45.89 AMB POC XRAY, WRIST; COMPLETE, 3+ VIE   2. Left wrist pain M25.532 719.43 AMB POC XRAY, WRIST; COMPLETE, 3+ VIE        PLAN:   1. Splint removed, incision cleaned steri strips applied  2. New volar splint applied  3. Cont rom of fingers  4.  Refill of percocet given today  RTC 2 weeks    Patient seen and evaluated by Dr. Alyssa Casey today who agrees with treatment plan    Follow-up Disposition: Not on [address], PA-C  [facility]

## 2017-03-01 ENCOUNTER — OFFICE VISIT (OUTPATIENT)
Dept: ORTHOPEDIC SURGERY | Age: 77
End: 2017-03-01

## 2017-03-01 VITALS
HEART RATE: 90 BPM | DIASTOLIC BLOOD PRESSURE: 76 MMHG | BODY MASS INDEX: 32.78 KG/M2 | SYSTOLIC BLOOD PRESSURE: 126 MMHG | HEIGHT: 63 IN | WEIGHT: 185 LBS

## 2017-03-01 DIAGNOSIS — S52.532D CLOSED COLLES' FRACTURE OF LEFT RADIUS WITH ROUTINE HEALING, SUBSEQUENT ENCOUNTER: Primary | ICD-10-CM

## 2017-03-01 DIAGNOSIS — Z98.890 POST-OPERATIVE STATE: ICD-10-CM

## 2017-03-01 RX ORDER — PROMETHAZINE HYDROCHLORIDE 25 MG/1
25 TABLET ORAL
Qty: 40 TAB | Refills: 0 | Status: SHIPPED | OUTPATIENT
Start: 2017-03-01 | End: 2017-03-03 | Stop reason: SDUPTHER

## 2017-03-01 NOTE — PROCEDURES
Three views of the left wrist show excellent position of the fracture site. The hardware is in excellent position.

## 2017-03-03 ENCOUNTER — OFFICE VISIT (OUTPATIENT)
Dept: PAIN MANAGEMENT | Age: 77
End: 2017-03-03

## 2017-03-03 VITALS
SYSTOLIC BLOOD PRESSURE: 131 MMHG | BODY MASS INDEX: 32.77 KG/M2 | DIASTOLIC BLOOD PRESSURE: 84 MMHG | WEIGHT: 185 LBS | HEART RATE: 111 BPM

## 2017-03-03 DIAGNOSIS — G47.01 INSOMNIA SECONDARY TO CHRONIC PAIN: ICD-10-CM

## 2017-03-03 DIAGNOSIS — M05.79 RHEUMATOID ARTHRITIS INVOLVING MULTIPLE SITES WITH POSITIVE RHEUMATOID FACTOR (HCC): ICD-10-CM

## 2017-03-03 DIAGNOSIS — M81.0 OSTEOPOROSIS: ICD-10-CM

## 2017-03-03 DIAGNOSIS — G89.4 CHRONIC PAIN SYNDROME: ICD-10-CM

## 2017-03-03 DIAGNOSIS — M25.50 PAIN IN JOINT, MULTIPLE SITES: ICD-10-CM

## 2017-03-03 DIAGNOSIS — K59.03 THERAPEUTIC OPIOID INDUCED CONSTIPATION: ICD-10-CM

## 2017-03-03 DIAGNOSIS — T40.2X5A THERAPEUTIC OPIOID INDUCED CONSTIPATION: ICD-10-CM

## 2017-03-03 DIAGNOSIS — G44.219 EPISODIC TENSION-TYPE HEADACHE, NOT INTRACTABLE: ICD-10-CM

## 2017-03-03 DIAGNOSIS — G89.29 INSOMNIA SECONDARY TO CHRONIC PAIN: ICD-10-CM

## 2017-03-03 DIAGNOSIS — M15.9 PRIMARY OSTEOARTHRITIS INVOLVING MULTIPLE JOINTS: Primary | ICD-10-CM

## 2017-03-03 RX ORDER — HYDROCODONE BITARTRATE AND ACETAMINOPHEN 10; 325 MG/1; MG/1
1 TABLET ORAL
Qty: 135 TAB | Refills: 0 | Status: SHIPPED | OUTPATIENT
Start: 2017-04-01 | End: 2017-05-31 | Stop reason: SDUPTHER

## 2017-03-03 RX ORDER — HYDROCODONE BITARTRATE AND ACETAMINOPHEN 10; 325 MG/1; MG/1
1 TABLET ORAL
Qty: 135 TAB | Refills: 0 | Status: SHIPPED | OUTPATIENT
Start: 2017-03-03 | End: 2017-05-31 | Stop reason: SDUPTHER

## 2017-03-03 RX ORDER — HYDROCODONE BITARTRATE AND ACETAMINOPHEN 10; 325 MG/1; MG/1
1 TABLET ORAL
Qty: 135 TAB | Refills: 0 | Status: SHIPPED | OUTPATIENT
Start: 2017-04-30 | End: 2017-05-31 | Stop reason: SDUPTHER

## 2017-03-03 NOTE — PROGRESS NOTES
Nursing Notes    Patient presents to the office today in follow-up. Patient rates her pain at 4/10 on the numerical pain scale. Reviewed medications with counts as follows:    Rx Date filled Qty Dispensed Pill Count Last Dose Short   norco 10 1/29/17 135 0 2 weeks  no   MS Contin 30 2/21/17 60 22 One week 7 days         Comments: pt reports she has no idea what happened to the MS Contin. She reports that she hasn't been taking them as they were ineffective. Will send warning letter. POC UDS was not performed in office today    Any new labs or imaging since last appointment? YES, pre op labs, wrist xray for surgery    Have you been to an emergency room (ER) or urgent care clinic since your last visit?  no          Have you been hospitalized since your last visit? NO     If yes, where, when, and reason for visit? Have you seen or consulted any other health care providers outside of the [facility]  since your last visit? YES     If yes, where, when, and reason for visit? Surgeon, Dr. Amy Chávez    Ms. Joe Vargas has a reminder for a \"due or due soon\" health maintenance. I have asked that she contact her primary care provider for follow-up on this health maintenance.

## 2017-03-03 NOTE — PROGRESS NOTES
HISTORY OF PRESENT ILLNESS  Elise Griffith is a 68 y.o. female. HPI She returns for followup of chronic, severe pain which is widespread and polyarticular related to rheumatoid arthritis. She is s/p left TKA. Prior medications include Oxycontin and Zohydro with no improvement. Fentanyl patch caused rash and nausea  Since last seen, she sustained a slip and fall fracturing her distal left radius which required open reduction and internal fixation. At the last visit, she was started on MS Contin which she indicates she has not been taking regularly. Pill count was noted to be significantly off today which she cannot explain. She does not feel the MS Contin was helping and it will be discontinued and she will remain on her hydrocodone alone which she does feel has been satisfactory in alleviating her pain. Instructions for the correct disposal of her medication was given to the patient and her  who accompanied her. Pain level today 4 out of 10, outcome 13/28,(The lower the upper number, the better the outcome)  Physical activity and mobility, mood, and sleep are all poor. No reported side effects. Review of Systems   Constitutional: Positive for malaise/fatigue. Negative for chills, fever and weight loss. HENT: Negative for congestion and sore throat. Eyes: Negative. Negative for blurred vision and double vision. Respiratory: Negative. Negative for cough. Cardiovascular: Negative. Negative for leg swelling. Gastrointestinal: Positive for constipation, heartburn, nausea and vomiting. Negative for diarrhea. Genitourinary: Negative. Musculoskeletal: Positive for myalgias. Negative for falls. Skin: Negative. Neurological: Negative for dizziness, tremors, sensory change, seizures and weakness. Endo/Heme/Allergies: Negative. Negative for environmental allergies. Does not bruise/bleed easily. Psychiatric/Behavioral: Positive for depression.  Negative for suicidal ideas. The patient is nervous/anxious and has insomnia. Physical Exam   Constitutional: She is oriented to person, place, and time. She appears well-developed and well-nourished. HENT:   Head: Normocephalic. Eyes: EOM are normal.   Neck: No thyromegaly present. Pulmonary/Chest: Effort normal. No respiratory distress. Musculoskeletal: She exhibits tenderness. Antalgic gait using a walker. Neurological: She is alert and oriented to person, place, and time. She has normal reflexes. Gait abnormal.   Skin: Skin is warm and dry. Psychiatric: She has a normal mood and affect. Her behavior is normal. Judgment and thought content normal.   Nursing note and vitals reviewed. ASSESSMENT and PLAN  Encounter Diagnoses   Name Primary?  Primary osteoarthritis involving multiple joints Yes    Episodic tension-type headache, not intractable     Rheumatoid arthritis involving multiple sites with positive rheumatoid factor (HCC)     Therapeutic opioid induced constipation     Osteoporosis     Chronic pain syndrome     Insomnia secondary to chronic pain     Pain in joint, multiple sites      Treatment plan as noted above. 3 month reassess her    No concerns are raised for misuse, abuse, or diversion. 1. Pain medications are prescribed with the objective of pain relief and improved physical and psychosocial function in this patient. 2. Counseled patient on proper use of prescribed medications and reviewed opioid contract. 3. Counseled patient about chronic medical conditions and their relationship to anxiety and depression and recommended mental health support as needed. 4. Reviewed with patient self-help tools, home exercise, and lifestyle changes to assist the patient in self-management of symptoms.   5. Advised patient to have a primary care provider to continue care for health maintenance and general medical conditions and support for referral to specialty care as needed. 6. Reviewed with patient the treatment plan, goals of treatment plan, and limitations of treatment plan, to include the potential for side effects from medications and procedures. If side effects occur, it is the responsibility of the patient to inform the clinic so that a change in the treatment plan can be made in a safe manner. The patient is advised that stopping prescribed medication may cause an increase in symptoms and possible medication withdrawal symptoms. The patient is informed an emergency room evaluation may be necessary if this occurs. DISPOSITION: The patients condition and plan were discussed at length and all questions were answered. The patient agrees with the plan.     Counseling occupied > 50% of visit:  Total time: 40 minutes

## 2017-03-03 NOTE — PROGRESS NOTES
Ms. Marivel Conner was advised to discontinue  mser. She was provided education on proper medication disposal options as indicated by the FDA/RILEY. She was also advised that failing to properly dispose of medications that are no longer part of her regimen would be considered non-compliance with the treatment plan.

## 2017-03-03 NOTE — PATIENT INSTRUCTIONS
You are in possession of medication that is no longer part of your active treatment regimen. We strongly advise that you properly dispose of it as quickly as possible to help reduce the chance that others may accidentally take or intentionally misuse the discontinued medication. Please understand that ongoing use or distribution of a discontinued medication containing a controlled substance is a violation of your signed Controlled Substance Consent & Treatment Agreement and will result in dismissal from our practice. Listed below are some options and special instructions to consider when disposing of discontinued, , unwanted, or unused medications:    · Drug Encorcement Agency (RILEY) authorized collectors cansafely and securely collect and dispose of pharmaceuticals containing controlled substances and other medications. Authorized collection sites may be [employer], hospital or clinic pharmacies, and law enforcement locations. For RILEY-authorized collectors near you, contact the [place] of [address] [place] [address] [facility]AL [facility] at [phone] or visit the following web address: Weatlas. COH.CellAegis Devices/AscendifydispsEARTHTORY/spring/main?execution=e1s1. RILEY-authorized collectors within the local [address] [place] [address] [place] include:  [address] [address], [address]    [address]  C/René Lepe, [facility]  [address] [place] [address]    56 [facility], [facility] [address], [address]    [address]  One [facility], [facility], [facility]    · Medicine take-back programs are another good way to safely dispose of most types of discontinued medications. The RILEY periodically hosts National Prescription Drug Take-Back events where collection sites are set up in communities nationwide for safe disposal of prescription drugs. Local law enforcement agencies may also sponsor medication take-back programs. · If unable to reach a medication take-back program or RILEY-authorized , you can also follow these simple steps to dispose of medications in the household trash:  1. Mix medications (do not crush tablets or capsules) with an unappetizing substance such as dirt, sony litter, or used coffee grounds;  2. Place the mixture in a container such as a sealed plastic bag;  3. Throw the container in your household trash;  4. Scratch out all personal information on the prescription label of your empty pill bottle or empty medication packaging to make it unreadable, then dispose of the container. Current health maintenance issues were reviewed and the patient was advised to followup with his/her PCP for completion of these items.

## 2017-03-03 NOTE — MR AVS SNAPSHOT
Visit Information Date & Time Provider Department Dept. Phone Encounter #  
 3/3/2017 10:40 AM Celia Hoyos MD [address] [place] [address] Pain Management [phone] [id number] Follow-up Instructions Return in about 3 months (around 6/3/2017). Your Appointments 3/22/2017  1:30 PM  
Follow Up with GURJIT Lezama  
[facility] ([address]) Appt Note: LEFT WRIST FU  
 [address], [address] [facility]  
[phone] 2300 [facility], [address] Upcoming Health Maintenance Date Due DTaP/Tdap/Td series (1 - Tdap) 7/18/1961 ZOSTER VACCINE AGE 60> 7/18/2000 GLAUCOMA SCREENING Q2Y 7/18/2005 Pneumococcal 65+ Low/Medium Risk (1 of 2 - PCV13) 7/18/2005 MEDICARE YEARLY EXAM 7/18/2005 INFLUENZA AGE 9 TO ADULT 8/1/2016 Allergies as of 3/3/2017  Review Complete On: 3/3/2017 By: Celia Hoyos MD  
  
 Severity Noted Reaction Type Reactions Neurontin [Gabapentin]  05/12/2015    Other (comments) Current Immunizations  Never Reviewed No immunizations on file. Not reviewed this visit Vitals BP  
  
  
  
  
  
 131/84 Vitals History BMI and BSA Data Body Mass Index Body Surface Area 32.77 kg/m 2 1.93 m 2 Preferred Pharmacy Pharmacy Name Phone [id number] [address]. [phone] Your Updated Medication List  
  
   
This list is accurate as of: 3/3/17 12:02 PM.  Always use your most recent med list.  
  
  
  
  
 atorvastatin 10 mg tablet Commonly known as:  LIPITOR Take  by mouth daily. Calcium-Cholecalciferol (D3) 600 mg(1,500mg) -400 unit Cap Take  by mouth. CARDIZEM 120 mg tablet Generic drug:  dilTIAZem Take 120 mg by mouth daily. * HYDROcodone-acetaminophen  mg tablet Commonly known as:  Coy Yin  
 Take 1 Tab by mouth every six (6) hours as needed for Pain for up to 30 days. Max Daily Amount: 4 Tabs. May take an additional tablet on bad days not to exceed #135 per month. Indications: Pain  
  
 * HYDROcodone-acetaminophen  mg tablet Commonly known as:  Shivani Looian Take 1 Tab by mouth every six (6) hours as needed for Pain for up to 30 days. Max Daily Amount: 4 Tabs. May take an additional tablet on bad days not to exceed #135 per month. Indications: Pain Start taking on:  4/1/2017  
  
 * HYDROcodone-acetaminophen  mg tablet Commonly known as:  Shivani Parisian Take 1 Tab by mouth every six (6) hours as needed for Pain for up to 30 days. Max Daily Amount: 4 Tabs. May take an additional tablet on bad days not to exceed #135 per month. Indications: Pain Start taking on:  4/30/2017 LATUDA 20 mg Tab tablet Generic drug:  lurasidone Take  by mouth. ondansetron 4 mg disintegrating tablet Commonly known as:  ZOFRAN ODT Take 4 mg by mouth every eight (8) hours as needed for Nausea. Indications: ACUTE GASTROENTERITIS-RELATED VOMITING IN PEDIATRICS  
  
 polyethylene glycol 17 gram packet Commonly known as:  Soraya Kehr Take 1 Packet by mouth daily. PROLIA 60 mg/mL injection Generic drug:  denosumab 60 mg by SubCUTAneous route. promethazine 25 mg tablet Commonly known as:  PHENERGAN Take 1 Tab by mouth every six (6) hours as needed for Nausea. senna-docusate 8.6-50 mg per tablet Commonly known as:  Leellen Cancel Take 1 Tab by mouth daily. traZODone 50 mg tablet Commonly known as:  Ori Garcia Take 150 mg by mouth nightly. Indications: INSOMNIA  
  
 triamcinolone acetonide 0.1 % ointment Commonly known as:  KENALOG Apply  to affected area. use thin layer WELLBUTRIN  mg tablet Generic drug:  buPROPion XL Take 300 mg by mouth every morning. XANAX 1 mg tablet Generic drug:  ALPRAZolam  
Take 1 mg by mouth as needed. * Notice: This list has 3 medication(s) that are the same as other medications prescribed for you. Read the directions carefully, and ask your doctor or other care provider to review them with you. Prescriptions Printed Refills HYDROcodone-acetaminophen (NORCO)  mg tablet 0 Starting on: 4/30/2017 Sig: Take 1 Tab by mouth every six (6) hours as needed for Pain for up to 30 days. Max Daily Amount: 4 Tabs. May take an additional tablet on bad days not to exceed #135 per month. Indications: Pain Class: Print Route: Oral  
 HYDROcodone-acetaminophen (NORCO)  mg tablet 0 Starting on: 4/1/2017 Sig: Take 1 Tab by mouth every six (6) hours as needed for Pain for up to 30 days. Max Daily Amount: 4 Tabs. May take an additional tablet on bad days not to exceed #135 per month. Indications: Pain Class: Print Route: Oral  
 HYDROcodone-acetaminophen (NORCO)  mg tablet 0 Sig: Take 1 Tab by mouth every six (6) hours as needed for Pain for up to 30 days. Max Daily Amount: 4 Tabs. May take an additional tablet on bad days not to exceed #135 per month. Indications: Pain Class: Print Route: Oral  
  
Follow-up Instructions Return in about 3 months (around 6/3/2017). Patient Instructions You are in possession of medication that is no longer part of your active treatment regimen. We strongly advise that you properly dispose of it as quickly as possible to help reduce the chance that others may accidentally take or intentionally misuse the discontinued medication. Please understand that ongoing use or distribution of a discontinued medication containing a controlled substance is a violation of your signed Controlled Substance Consent & Treatment Agreement and will result in dismissal from our practice.    
 
Listed below are some options and special instructions to consider when disposing of discontinued, , unwanted, or unused medications: · Drug Encorcement Agency (RILEY) authorized collectors cansafely and securely collect and dispose of pharmaceuticals containing controlled substances and other medications. Authorized collection sites may be [employer], hospital or clinic pharmacies, and law enforcement locations. For RILEY-authorized collectors near you, contact the [place] of [address] [address] [facility] at [phone] or visit the following web address: [email or web address]. Bioscience Vaccines.SAW Instrument/[place] RetreatpsGraine de [place]/spring/main?execution=e1s1. RILEY-authorized collectors within the local [address] [place] include: 
[address], [address] [facility] 
C/René Gonzalez  [place], [place] [address] [place], [address] 
[address] [place] [address] 100 [address], [address] [facility] · Medicine take-back programs are another good way to safely dispose of most types of discontinued medications. The RILEY periodically hosts National Prescription Drug Take-Back events where collection sites are set up in communities nationwide for safe disposal of prescription drugs. Local law enforcement agencies may also sponsor medication take-back programs. · If unable to reach a medication take-back program or RILEY-authorized , you can also follow these simple steps to dispose of medications in the household trash: 
1. Mix medications (do not crush tablets or capsules) with an unappetizing substance such as dirt, sony litter, or used coffee grounds; 2. Place the mixture in a container such as a sealed plastic bag; 
3.  Throw the container in your household trash; 
4. Scratch out all personal information on the prescription label of your empty pill bottle or empty medication packaging to make it unreadable, then dispose of the container. Current health maintenance issues were reviewed and the patient was advised to followup with his/her PCP for completion of these items. Introducing [facility] HE[facility] SERVICES! Dear Myriam Anderson: 
Thank you for requesting a Tamion account. Our records indicate that you already have an active Tamion account. You can access your account anytime at [email or web address] Did you know that you can access your hospital and ER discharge instructions at any time in Tamion? You can also review all of your test results from your hospital stay or ER visit. Additional Information If you have questions, please visit the Frequently Asked Questions section of the Tamion website at [email or web address] Remember, Tamion is NOT to be used for urgent needs. For medical emergencies, dial 911. Now available from your iPhone and Android! Please provide this summary of care documentation to your next provider. Your primary care clinician is listed as [address] If you have any questions after today's visit, please call [phone].

## 2017-03-22 ENCOUNTER — OFFICE VISIT (OUTPATIENT)
Dept: ORTHOPEDIC SURGERY | Age: 77
End: 2017-03-22

## 2017-03-22 VITALS
DIASTOLIC BLOOD PRESSURE: 67 MMHG | HEART RATE: 88 BPM | WEIGHT: 185 LBS | TEMPERATURE: 97.6 F | BODY MASS INDEX: 32.78 KG/M2 | SYSTOLIC BLOOD PRESSURE: 116 MMHG | HEIGHT: 63 IN

## 2017-03-22 DIAGNOSIS — S52.532D CLOSED COLLES' FRACTURE OF LEFT RADIUS WITH ROUTINE HEALING, SUBSEQUENT ENCOUNTER: Primary | ICD-10-CM

## 2017-03-22 NOTE — PROGRESS NOTES
Ori Fritz  1940     HISTORY OF PRESENT ILLNESS  Ori Fritz is a 68 y.o. female who presents today for evaluation s/p ORIF left wrist on 2/8/17. She is doing much better. . States pain is 2/10 but that is mostly in other body parts. She did not go to OT. Has been doing exercises on her own. Patient denies any fever, chills, chest pain, shortness of breath or calf pain. There are no new illness or injuries to report since last seen in the office. No changes in medications, allergies, social or family history. PHYSICAL EXAM:   Visit Vitals    /67    Pulse 88    Temp 97.6 °F (36.4 °C)    Ht 5' 3\" (1.6 m)    Wt 185 lb (83.9 kg)    BMI 32.77 kg/m2      The patient is a well-developed, well-nourished female in no acute distress. The patient is alert and oriented times three. The patient appears to be well groomed. Mood and affect are normal.  ORTHOPEDIC EXAM of left wrist:  Inspection: no swelling  Incision well healed  Range of motion: able to make full fist  No ttp  Stability: Stable  Strength: n/a    IMAGING: 3 view xray of left wrist read by myself reveal hardware in excellent position with callus formation seen  IMPRESSION:      ICD-10-CM ICD-9-CM    1. Closed Colles' fracture of left radius with routine healing, subsequent encounter S52.532D V54.12 AMB POC XRAY, WRIST; COMPLETE, 3+ VIE        PLAN:   1. Patient continues to improve  2. Will wear her brace when she is more active  3. Gradually increase her activities with no increases in pain  4.  RTC 4 weeks if pain persists    MIKI Wallace [facility]

## 2017-05-31 ENCOUNTER — OFFICE VISIT (OUTPATIENT)
Dept: PAIN MANAGEMENT | Age: 77
End: 2017-05-31

## 2017-05-31 VITALS — DIASTOLIC BLOOD PRESSURE: 90 MMHG | RESPIRATION RATE: 16 BRPM | SYSTOLIC BLOOD PRESSURE: 140 MMHG | HEART RATE: 44 BPM

## 2017-05-31 DIAGNOSIS — Z79.899 ENCOUNTER FOR LONG-TERM (CURRENT) USE OF MEDICATIONS: Primary | ICD-10-CM

## 2017-05-31 DIAGNOSIS — G89.4 CHRONIC PAIN SYNDROME: ICD-10-CM

## 2017-05-31 DIAGNOSIS — M25.50 PAIN IN JOINT, MULTIPLE SITES: ICD-10-CM

## 2017-05-31 DIAGNOSIS — M05.79 RHEUMATOID ARTHRITIS INVOLVING MULTIPLE SITES WITH POSITIVE RHEUMATOID FACTOR (HCC): ICD-10-CM

## 2017-05-31 DIAGNOSIS — M17.10 PRIMARY LOCALIZED OSTEOARTHROSIS, LOWER LEG, UNSPECIFIED LATERALITY: ICD-10-CM

## 2017-05-31 LAB
ALCOHOL UR POC: NORMAL
AMPHETAMINES UR POC: NORMAL
BARBITURATES UR POC: NORMAL
BENZODIAZEPINES UR POC: NORMAL
BUPRENORPHINE UR POC: NORMAL
CANNABINOIDS UR POC: NORMAL
CARISOPRODOL UR POC: NORMAL
COCAINE UR POC: NORMAL
FENTANYL UR POC: NORMAL
MDMA/ECSTASY UR POC: NORMAL
METHADONE UR POC: NORMAL
METHAMPHETAMINE UR POC: NORMAL
METHYLPHENIDATE UR POC: NORMAL
OPIATES UR POC: NORMAL
OXYCODONE UR POC: NORMAL
PHENCYCLIDINE UR POC: NORMAL
PROPOXYPHENE UR POC: NORMAL
TRAMADOL UR POC: NORMAL
TRICYCLICS UR POC: NORMAL

## 2017-05-31 RX ORDER — HYDROCODONE BITARTRATE AND ACETAMINOPHEN 10; 325 MG/1; MG/1
1 TABLET ORAL
Qty: 135 TAB | Refills: 0 | Status: SHIPPED | OUTPATIENT
Start: 2017-06-01 | End: 2017-08-24 | Stop reason: SDUPTHER

## 2017-05-31 RX ORDER — HYDROCODONE BITARTRATE AND ACETAMINOPHEN 10; 325 MG/1; MG/1
1 TABLET ORAL
Qty: 135 TAB | Refills: 0 | Status: SHIPPED | OUTPATIENT
Start: 2017-07-01 | End: 2017-08-24 | Stop reason: SDUPTHER

## 2017-05-31 NOTE — PROGRESS NOTES
Nursing Notes    Patient presents to the office today in follow-up. Reviewed medications with counts as follows:    Rx Date filled Qty Dispensed Pill Count Last Dose Short   norco 10/325 4/30/17 135 5 today no   Ms. Anna العلي has a reminder for a \"due or due soon\" health maintenance. I have asked that she contact her primary care provider for follow-up on this health maintenance. POC UDS was performed in office today    Any new labs or imaging since last appointment? YES  Labs  Xray of left wrist     Have you been to an emergency room (ER) or urgent care clinic since your last visit? YES          [facility] ER for a fall. Sustained a broken wrist     Have you been hospitalized since your last visit? NO     If yes, where, when, and reason for visit? Have you seen or consulted any other health care providers outside of the [facility]  since your last visit? YES     If yes, where, when, and reason for visit?    Dr Gregoria Borwning

## 2017-05-31 NOTE — MR AVS SNAPSHOT
Visit Information Date & Time Provider Department Dept. Phone Encounter #  
 5/31/2017  1:20 PM Stephani Jewell, [address] for Pain Management [phone] Follow-up Instructions Return in about 3 months (around 8/31/2017). Upcoming Health Maintenance Date Due DTaP/Tdap/Td series (1 - Tdap) 7/18/1961 ZOSTER VACCINE AGE 60> 7/18/2000 GLAUCOMA SCREENING Q2Y 7/18/2005 Pneumococcal 65+ Low/Medium Risk (1 of 2 - PCV13) 7/18/2005 MEDICARE YEARLY EXAM 7/18/2005 INFLUENZA AGE 9 TO ADULT 8/1/2017 Allergies as of 5/31/2017  Review Complete On: 5/31/2017 By: GURJIT Hodges Severity Noted Reaction Type Reactions Neurontin [Gabapentin]  05/12/2015    Other (comments) Current Immunizations  Never Reviewed No immunizations on file. Not reviewed this visit You Were Diagnosed With   
  
 Codes Comments Encounter for long-term (current) use of medications    -  Primary ICD-10-CM: J37.779 ICD-9-CM: V58.69 Rheumatoid arthritis involving multiple sites with positive rheumatoid factor (HCC)     ICD-10-CM: M05.79 ICD-9-CM: 714.0 Chronic pain syndrome     ICD-10-CM: G89.4 ICD-9-CM: 338.4 Pain in joint, multiple sites     ICD-10-CM: M25.50 ICD-9-CM: 719.49 Primary localized osteoarthrosis, lower leg, unspecified laterality     ICD-10-CM: M17.10 ICD-9-CM: 715.16 Vitals BP Pulse Resp OB Status Smoking Status 140/90 (!) 44 16 Postmenopausal Former Smoker Vitals History Preferred Pharmacy Pharmacy Name Phone [id number] [address]. [phone] Your Updated Medication List  
  
   
This list is accurate as of: 5/31/17  1:44 PM.  Always use your most recent med list.  
  
  
  
  
 atorvastatin 10 mg tablet Commonly known as:  LIPITOR Take  by mouth daily. Calcium-Cholecalciferol (D3) 600 mg(1,500mg) -400 unit Cap Take  by mouth. CARDIZEM 120 mg tablet Generic drug:  dilTIAZem Take 120 mg by mouth daily. * HYDROcodone-acetaminophen  mg tablet Commonly known as:  Valerie Holiday Take 1 Tab by mouth every six (6) hours as needed for Pain for up to 30 days. Max Daily Amount: 4 Tabs. May take an additional tablet on bad days not to exceed #135 per month. Indications: Pain Start taking on:  6/1/2017  
  
 * HYDROcodone-acetaminophen  mg tablet Commonly known as:  Valerie Holiday Take 1 Tab by mouth every six (6) hours as needed for Pain for up to 30 days. Max Daily Amount: 4 Tabs. May take an additional tablet on bad days not to exceed #135 per month. Indications: Pain Start taking on:  7/1/2017  
  
 * HYDROcodone-acetaminophen  mg tablet Commonly known as:  Valerie Holiday Take 1 Tab by mouth every six (6) hours as needed for Pain for up to 30 days. Max Daily Amount: 4 Tabs. May take an additional tablet on bad days not to exceed #135 per month. Indications: Pain Start taking on:  7/1/2017 LATUDA 20 mg Tab tablet Generic drug:  lurasidone Take  by mouth. ondansetron 4 mg disintegrating tablet Commonly known as:  ZOFRAN ODT Take 4 mg by mouth every eight (8) hours as needed for Nausea. Indications: ACUTE GASTROENTERITIS-RELATED VOMITING IN PEDIATRICS  
  
 polyethylene glycol 17 gram packet Commonly known as:  Ulysses Aaron Take 1 Packet by mouth daily. PROLIA 60 mg/mL injection Generic drug:  denosumab 60 mg by SubCUTAneous route. promethazine 25 mg tablet Commonly known as:  PHENERGAN Take 1 Tab by mouth every six (6) hours as needed for Nausea. senna-docusate 8.6-50 mg per tablet Commonly known as:  Veto Singer Take 1 Tab by mouth daily. traZODone 50 mg tablet Commonly known as:  Fidelia Wilkerson Take 150 mg by mouth nightly. Indications: INSOMNIA  
  
 triamcinolone acetonide 0.1 % ointment Commonly known as:  KENALOG Apply  to affected area. use thin layer WELLBUTRIN  mg tablet Generic drug:  buPROPion XL Take 300 mg by mouth every morning. XANAX 1 mg tablet Generic drug:  ALPRAZolam  
Take 1 mg by mouth as needed. * Notice: This list has 3 medication(s) that are the same as other medications prescribed for you. Read the directions carefully, and ask your doctor or other care provider to review them with you. Prescriptions Printed Refills HYDROcodone-acetaminophen (NORCO)  mg tablet 0 Starting on: 6/1/2017 Sig: Take 1 Tab by mouth every six (6) hours as needed for Pain for up to 30 days. Max Daily Amount: 4 Tabs. May take an additional tablet on bad days not to exceed #135 per month. Indications: Pain Class: Print Route: Oral  
 HYDROcodone-acetaminophen (NORCO)  mg tablet 0 Starting on: 7/1/2017 Sig: Take 1 Tab by mouth every six (6) hours as needed for Pain for up to 30 days. Max Daily Amount: 4 Tabs. May take an additional tablet on bad days not to exceed #135 per month. Indications: Pain Class: Print Route: Oral  
 HYDROcodone-acetaminophen (NORCO)  mg tablet 0 Starting on: 7/1/2017 Sig: Take 1 Tab by mouth every six (6) hours as needed for Pain for up to 30 days. Max Daily Amount: 4 Tabs. May take an additional tablet on bad days not to exceed #135 per month. Indications: Pain Class: Print Route: Oral  
  
We Performed the Following AMB POC DRUG SCREEN () [ [facility]] DRUG SCREEN [[id number] Custom] Follow-up Instructions Return in about 3 months (around 8/31/2017). Patient Instructions 1. Continue current plan. Stable on current medication without adverse events. 2. Refill Norco 10/325 4 times a day as needed. An additional tablet can be taken as needed on bad days. NO more than #135/day. Continue with MiraLAX as needed Discussed risks of addiction, dependency, and opioid induced hyperalgesia. Return to clinic in 3 months Introducing [facility] & HEALTH SERVICES! Dear Sandra Listen: 
Thank you for requesting a [employer] account. Our records indicate that you already have an active [employer] account. You can access your account anytime at [email or web address] Did you know that you can access your hospital and ER discharge instructions at any time in [employer]? You can also review all of your test results from your hospital stay or ER visit. Additional Information If you have questions, please visit the Frequently Asked Questions section of the [employer] website at [email or web address] Remember, [employer] is NOT to be used for urgent needs. For medical emergencies, dial 911. Now available from your iPhone and Android! Please provide this summary of care documentation to your next provider. Your primary care clinician is listed as [address] If you have any questions after today's visit, please call [phone].

## 2017-05-31 NOTE — PATIENT INSTRUCTIONS
1. Continue current plan. Stable on current medication without adverse events. 2. Refill Norco 10/325 4 times a day as needed. An additional tablet can be taken as needed on bad days. NO more than #135/day. Continue with MiraLAX as needed  Discussed risks of addiction, dependency, and opioid induced hyperalgesia.   Return to clinic in 3 months

## 2017-05-31 NOTE — PROGRESS NOTES
HISTORY OF PRESENT ILLNESS  Phuong Frey is a 68 y.o. female. HPI She returns for followup of chronic, severe pain which is widespread and polyarticular related to rheumatoid arthritis. She is s/p left TKA. Prior medications include Oxycontin and Zohydro with no improvement. Fentanyl patch caused rash and nausea  She is doing well since her last visit. During her last visit she had reported a slip and fall fracturing her left distal radius which required an open reduction and internal fixation by Dr. Elgin Baer on 2/8/2017. She is doing well currently. She says that she has been doing home therapy but never attended formal physical therapy. During her last visit we restarted morphine ER however this was discontinued during her last visit with Dr. Reba Nieves. She was 7 days short that visit. She denies any misuse of her medication. After further discussion she does note that she keeps her medication and a weekly organizer and think she must have left this medication at home. She does report that the morphine was not working that well anyway. She reports that she is doing well with her current treatment of hydrocodone on an as-needed basis. She reports significant reduction in her pain. She does have good days and bad days and notes that the weather changes can worsen her pain. I will have her follow-up in 3 months for further evaluation recommendation. She is currently taking Norco 10/325 mg  up to 4 times per day with an occasional extra pill for bad days. NO more than #135/day. Medications are helping with pain control and quality of life. Her pain is 3-4/10 with medication and 9/10 without. Pt describes pain as aching. Walking and house chores aggravates her pain.  Her pain is improved with medication and rest. Current treatment is helping to improve general activity, mood, walking, sleep, enjoyment of life    Pt does report constipation but is currently under control with conservative treatment and MiraLAX  She  is otherwise doing well with no other complaints today. She denies any adverse events including nausea, vomiting, dizziness,  hallucinations, or seizures. POC UDS today. Confirmation pending. Allergies   Allergen Reactions    Neurontin [Gabapentin] Other (comments)       Past Surgical History:   Procedure Laterality Date    FOOT/TOES SURGERY PROC UNLISTED      HX BREAST AUGMENTATION  1996    Saline Implants    HX CHOLECYSTECTOMY      HX GI  2012    LOW ANTERIOR RESECTION WITH COLOSTOMY AND LEFT OOPHORECTOMY    HX KNEE ARTHROSCOPY Right     HX MASTECTOMY  1996    bilateral mastectomy    HX ORTHOPAEDIC      RIGHT FOOT AND ANKLE SX    HX ORTHOPAEDIC Left 2017    wrist repair    HX OTHER SURGICAL      multiple sx right leg and foot         Review of Systems   Constitutional: Positive for malaise/fatigue. Negative for chills, fever and weight loss. HENT: Negative for congestion and sore throat. Eyes: Negative. Negative for blurred vision and double vision. Respiratory: Negative. Negative for cough. Cardiovascular: Negative. Negative for leg swelling. Gastrointestinal: Positive for constipation, heartburn and nausea. Negative for diarrhea. Genitourinary: Negative. Musculoskeletal: Positive for myalgias. Negative for falls. Skin: Negative. Neurological: Negative for dizziness, tremors, sensory change, seizures and weakness. Endo/Heme/Allergies: Negative. Negative for environmental allergies. Does not bruise/bleed easily. Psychiatric/Behavioral: Positive for depression. Negative for suicidal ideas. The patient is nervous/anxious and has insomnia. Physical Exam   Constitutional: She is oriented to person, place, and time. She appears well-developed and well-nourished. HENT:   Head: Normocephalic. Eyes: EOM are normal.   Neck: No thyromegaly present. Pulmonary/Chest: Effort normal. No respiratory distress.    Musculoskeletal: She exhibits tenderness. Antalgic gait using a walker. Neurological: She is alert and oriented to person, place, and time. She has normal reflexes. Gait abnormal.   Skin: Skin is warm and dry. Psychiatric: She has a normal mood and affect. Her behavior is normal. Judgment and thought content normal.   Nursing note and vitals reviewed. ASSESSMENT:    Encounter Diagnoses   Name Primary?  Encounter for long-term (current) use of medications Yes    Rheumatoid arthritis involving multiple sites with positive rheumatoid factor (HCC)     Chronic pain syndrome     Pain in joint, multiple sites     Primary localized osteoarthrosis, lower leg, unspecified laterality         [place] Prescription Monitoring Program was reviewed which does not demonstrate aberrancies and/or inconsistencies with regard to the historical prescribing of controlled medications to this patient by other providers since her last visit. NOTE: She did receive oxycodone following a fall and wrist fracture filled on 2/6/2017. PLAN / Pt Instructions:  1. Continue current plan. Stable on current medication without adverse events. 2. Refill Norco 10/325 4 times a day as needed. An additional tablet can be taken as needed on bad days. NO more than #135/day. Continue with MiraLAX as needed  Discussed risks of addiction, dependency, and opioid induced hyperalgesia. Return to clinic in 3 months    Medications Ordered Today   Medications    HYDROcodone-acetaminophen (NORCO)  mg tablet     Sig: Take 1 Tab by mouth every six (6) hours as needed for Pain for up to 30 days. Max Daily Amount: 4 Tabs. May take an additional tablet on bad days not to exceed #135 per month. Indications: Pain     Dispense:  135 Tab     Refill:  0    HYDROcodone-acetaminophen (NORCO)  mg tablet     Sig: Take 1 Tab by mouth every six (6) hours as needed for Pain for up to 30 days. Max Daily Amount: 4 Tabs.  May take an additional tablet on bad days not to exceed #135 per month. Indications: Pain     Dispense:  135 Tab     Refill:  0    HYDROcodone-acetaminophen (NORCO)  mg tablet     Sig: Take 1 Tab by mouth every six (6) hours as needed for Pain for up to 30 days. Max Daily Amount: 4 Tabs. May take an additional tablet on bad days not to exceed #135 per month. Indications: Pain     Dispense:  135 Tab     Refill:  0       Pain medications prescribed with the objective of pain relief and improved physical and psychosocial function in this patient. Spent 25 minutes with patient today reviewing the treatment plan, goals of treatment plan, and limitations of the treatment plan, to include the potential for side effects from medications and procedures. Stephani Jewell [place] 5/31/2017     Note: Please excuse any typographical errors. Voice recognition software was used for this note and may cause mistakes.

## 2017-08-24 ENCOUNTER — OFFICE VISIT (OUTPATIENT)
Dept: PAIN MANAGEMENT | Age: 77
End: 2017-08-24

## 2017-08-24 VITALS
RESPIRATION RATE: 16 BRPM | TEMPERATURE: 97.3 F | OXYGEN SATURATION: 98 % | HEART RATE: 85 BPM | DIASTOLIC BLOOD PRESSURE: 74 MMHG | SYSTOLIC BLOOD PRESSURE: 133 MMHG | WEIGHT: 175 LBS | BODY MASS INDEX: 31 KG/M2

## 2017-08-24 DIAGNOSIS — G89.4 CHRONIC PAIN SYNDROME: ICD-10-CM

## 2017-08-24 DIAGNOSIS — M05.79 RHEUMATOID ARTHRITIS INVOLVING MULTIPLE SITES WITH POSITIVE RHEUMATOID FACTOR (HCC): ICD-10-CM

## 2017-08-24 DIAGNOSIS — M25.50 PAIN IN JOINT, MULTIPLE SITES: ICD-10-CM

## 2017-08-24 DIAGNOSIS — M17.10 PRIMARY LOCALIZED OSTEOARTHROSIS, LOWER LEG, UNSPECIFIED LATERALITY: ICD-10-CM

## 2017-08-24 RX ORDER — HYDROCODONE BITARTRATE AND ACETAMINOPHEN 10; 325 MG/1; MG/1
1 TABLET ORAL
Qty: 135 TAB | Refills: 0 | Status: SHIPPED | OUTPATIENT
Start: 2017-10-29 | End: 2017-11-14 | Stop reason: SDUPTHER

## 2017-08-24 RX ORDER — HYDROCODONE BITARTRATE AND ACETAMINOPHEN 10; 325 MG/1; MG/1
1 TABLET ORAL
Qty: 135 TAB | Refills: 0 | Status: SHIPPED | OUTPATIENT
Start: 2017-09-30 | End: 2017-11-14 | Stop reason: SDUPTHER

## 2017-08-24 RX ORDER — HYDROCODONE BITARTRATE AND ACETAMINOPHEN 10; 325 MG/1; MG/1
1 TABLET ORAL
Qty: 135 TAB | Refills: 0 | Status: SHIPPED | OUTPATIENT
Start: 2017-08-31 | End: 2017-11-14 | Stop reason: SDUPTHER

## 2017-08-24 NOTE — PROGRESS NOTES
Nursing Notes    Patient presents to the office today in follow-up. Patient rates her pain at 5/10 on the numerical pain scale. Reviewed medications with counts as follows:    Rx Date filled Qty Dispensed Pill Count Last Dose Short   norco 10 mg 08/01/17 135 30 today no     :    Comments: Patient is here today for a follow up appt today she states her pain level today is a 5  She states she has had an eye exam recently. POC UDS was not performed in office today    Any new labs or imaging since last appointment? NO    Have you been to an emergency room (ER) or urgent care clinic since your last visit? NO            Have you been hospitalized since your last visit? NO     If yes, where, when, and reason for visit? Have you seen or consulted any other health care providers outside of the [address] [place] [address]  since your last visit? NO     If yes, where, when, and reason for visit? Ms. Samuel Soto has a reminder for a \"due or due soon\" health maintenance. I have asked that she contact her primary care provider for follow-up on this health maintenance.

## 2017-08-24 NOTE — MR AVS SNAPSHOT
Visit Information Date & Time Provider Department Dept. Phone Encounter #  
 8/24/2017  2:20 PM GURJIT Mckeon [address] [place] [address] Pain Management [phone] [id number] Follow-up Instructions Return in about 3 months (around 11/24/2017). Follow-up and Disposition History Upcoming Health Maintenance Date Due DTaP/Tdap/Td series (1 - Tdap) 7/18/1961 ZOSTER VACCINE AGE 60> 5/18/2000 GLAUCOMA SCREENING Q2Y 7/18/2005 Pneumococcal 65+ Low/Medium Risk (1 of 2 - PCV13) 7/18/2005 MEDICARE YEARLY EXAM 7/18/2005 INFLUENZA AGE 9 TO ADULT 8/1/2017 Allergies as of 8/24/2017  Review Complete On: 8/24/2017 By: GURJIT Mckeon Severity Noted Reaction Type Reactions Neurontin [Gabapentin]  05/12/2015    Other (comments) Current Immunizations  Never Reviewed No immunizations on file. Not reviewed this visit You Were Diagnosed With   
  
 Codes Comments Rheumatoid arthritis involving multiple sites with positive rheumatoid factor (HCC)     ICD-10-CM: M05.79 ICD-9-CM: 714.0 Chronic pain syndrome     ICD-10-CM: G89.4 ICD-9-CM: 338.4 Pain in joint, multiple sites     ICD-10-CM: M25.50 ICD-9-CM: 719.49 Primary localized osteoarthrosis, lower leg, unspecified laterality     ICD-10-CM: M17.10 ICD-9-CM: 715.16 Vitals BP Pulse Temp Resp Weight(growth percentile) SpO2  
 133/74 (BP 1 Location: Right arm, BP Patient Position: Sitting) 85 97.3 °F (36.3 °C) 16 175 lb (79.4 kg) 98% BMI OB Status Smoking Status 31 kg/m2 Postmenopausal Former Smoker BMI and BSA Data Body Mass Index Body Surface Area  
 31 kg/m 2 1.88 m 2 Preferred Pharmacy Pharmacy Name Phone [id number] [address]. [phone] Your Updated Medication List  
  
   
This list is accurate as of: 8/24/17  3:25 PM.  Always use your most recent med list.  
 atorvastatin 10 mg tablet Commonly known as:  LIPITOR Take  by mouth daily. Calcium-Cholecalciferol (D3) 600 mg(1,500mg) -400 unit Cap Take  by mouth. CARDIZEM 120 mg tablet Generic drug:  dilTIAZem Take 120 mg by mouth daily. * HYDROcodone-acetaminophen  mg tablet Commonly known as:  Bisi Fore Take 1 Tab by mouth every six (6) hours as needed for Pain for up to 30 days. Max Daily Amount: 4 Tabs. May take an additional tablet on bad days not to exceed #135 per month. Indications: Pain Start taking on:  8/31/2017  
  
 * HYDROcodone-acetaminophen  mg tablet Commonly known as:  Bisi Fore Take 1 Tab by mouth every six (6) hours as needed for Pain for up to 30 days. Max Daily Amount: 4 Tabs. May take an additional tablet on bad days not to exceed #135 per month. Indications: Pain Start taking on:  9/30/2017  
  
 * HYDROcodone-acetaminophen  mg tablet Commonly known as:  Bisi Fore Take 1 Tab by mouth every six (6) hours as needed for Pain for up to 30 days. Max Daily Amount: 4 Tabs. May take an additional tablet on bad days not to exceed #135 per month. Indications: Pain Start taking on:  10/29/2017 LATUDA 20 mg Tab tablet Generic drug:  lurasidone Take  by mouth. ondansetron 4 mg disintegrating tablet Commonly known as:  ZOFRAN ODT Take 4 mg by mouth every eight (8) hours as needed for Nausea. Indications: ACUTE GASTROENTERITIS-RELATED VOMITING IN PEDIATRICS  
  
 polyethylene glycol 17 gram packet Commonly known as:  Ferol Trey Take 1 Packet by mouth daily. PROLIA 60 mg/mL injection Generic drug:  denosumab 60 mg by SubCUTAneous route. promethazine 25 mg tablet Commonly known as:  PHENERGAN Take 1 Tab by mouth every six (6) hours as needed for Nausea. senna-docusate 8.6-50 mg per tablet Commonly known as:  Ravinder Amarjit Take 1 Tab by mouth daily. traZODone 50 mg tablet Commonly known as:  Samule Grills Take 150 mg by mouth nightly. Indications: INSOMNIA  
  
 triamcinolone acetonide 0.1 % ointment Commonly known as:  KENALOG Apply  to affected area. use thin layer WELLBUTRIN  mg tablet Generic drug:  buPROPion XL Take 300 mg by mouth every morning. XANAX 1 mg tablet Generic drug:  ALPRAZolam  
Take 1 mg by mouth as needed. * Notice: This list has 3 medication(s) that are the same as other medications prescribed for you. Read the directions carefully, and ask your doctor or other care provider to review them with you. Prescriptions Printed Refills HYDROcodone-acetaminophen (NORCO)  mg tablet 0 Starting on: 8/31/2017 Sig: Take 1 Tab by mouth every six (6) hours as needed for Pain for up to 30 days. Max Daily Amount: 4 Tabs. May take an additional tablet on bad days not to exceed #135 per month. Indications: Pain Class: Print Route: Oral  
 HYDROcodone-acetaminophen (NORCO)  mg tablet 0 Starting on: 9/30/2017 Sig: Take 1 Tab by mouth every six (6) hours as needed for Pain for up to 30 days. Max Daily Amount: 4 Tabs. May take an additional tablet on bad days not to exceed #135 per month. Indications: Pain Class: Print Route: Oral  
 HYDROcodone-acetaminophen (NORCO)  mg tablet 0 Starting on: 10/29/2017 Sig: Take 1 Tab by mouth every six (6) hours as needed for Pain for up to 30 days. Max Daily Amount: 4 Tabs. May take an additional tablet on bad days not to exceed #135 per month. Indications: Pain Class: Print Route: Oral  
  
Follow-up Instructions Return in about 3 months (around 11/24/2017). Patient Instructions 1. Continue current plan. Stable on current medication without adverse events. 2. Refill Norco 10/325 4 times a day as needed. An additional tablet can be taken as needed on bad days. NO more than #135/day. Continue with MiraLAX as needed Add naloxone 4 mg nasal spray for opioid induced respiratory depression emergency only. Extensive counseling was provided regarding this medication. Instructions were given to take home Discussed risks of addiction, dependency, and opioid induced hyperalgesia. Return to clinic in 3 months Introducing [facility] & HEALTH SERVICES! Dear Carlos Jarquin: 
Thank you for requesting a [employer] account. Our records indicate that you already have an active [employer] account. You can access your account anytime at [email or web address] Did you know that you can access your hospital and ER discharge instructions at any time in [employer]? You can also review all of your test results from your hospital stay or ER visit. Additional Information If you have questions, please visit the Frequently Asked Questions section of the [employer] website at [email or web address] Remember, [employer] is NOT to be used for urgent needs. For medical emergencies, dial 911. Now available from your iPhone and Android! Please provide this summary of care documentation to your next provider. Your primary care clinician is listed as [address] If you have any questions after today's visit, please call [phone].

## 2017-08-24 NOTE — PATIENT INSTRUCTIONS
1. Continue current plan. Stable on current medication without adverse events. 2. Refill Norco 10/325 4 times a day as needed. An additional tablet can be taken as needed on bad days. NO more than #135/day. Continue with MiraLAX as needed  Add naloxone 4 mg nasal spray for opioid induced respiratory depression emergency only. Extensive counseling was provided regarding this medication. Instructions were given to take home  Discussed risks of addiction, dependency, and opioid induced hyperalgesia.   Return to clinic in 3 months

## 2017-08-24 NOTE — PROGRESS NOTES
HISTORY OF PRESENT ILLNESS  Megan Light is a 68 y.o. female. HPI She returns for followup of chronic, severe pain which is widespread and polyarticular related to rheumatoid arthritis. She is s/p left TKA. Prior medications include Oxycontin and Zohydro with no improvement. Fentanyl patch caused rash and nausea. She is s/p open reduction and internal fixation by Dr. Sol Foy on 2/8/2017. Ms. Mary Lou Irby continues unchanged since last visit. She is doing well with her current treatment plan which has been offering moderate pain control. She is recovering well since her recent wrist surgery by Dr. Sol Foy. She continues with her hydrocodone on an as-needed basis. We will continue with her current treatment plan with no changes today. I will have her follow-up in 3 months for further evaluation and recommendation. Because the patient's current regimen places him/her at increased risk for possible overdose, a prescription for naloxone nasal spray is being provided. The patient understands that this medication is only to be used in the setting of a possible overdose and that inadvertent use of this medication could precipitate overt withdrawal.     She is currently taking Norco 10/325 mg  up to 4 times per day with an occasional extra pill for bad days. NO more than #135/day. Medications are helping with pain control and quality of life. Her pain is 3-4/10 with medication and 9/10 without. Pt describes pain as aching. Walking and house chores aggravates her pain. Her pain is improved with medication and rest. Current treatment is helping to improve general activity, mood, walking, sleep, enjoyment of life    Pt does report constipation but is currently under control with conservative treatment and MiraLAX  She  is otherwise doing well with no other complaints today. She denies any adverse events including nausea, vomiting, dizziness,  hallucinations, or seizures.         Allergies   Allergen Reactions    Neurontin [Gabapentin] Other (comments)       Past Surgical History:   Procedure Laterality Date    FOOT/TOES SURGERY PROC UNLISTED      HX BREAST AUGMENTATION  1996    Saline Implants    HX CHOLECYSTECTOMY      HX GI  2012    LOW ANTERIOR RESECTION WITH COLOSTOMY AND LEFT OOPHORECTOMY    HX KNEE ARTHROSCOPY Right     HX MASTECTOMY  1996    bilateral mastectomy    HX ORTHOPAEDIC      RIGHT FOOT AND ANKLE SX    HX ORTHOPAEDIC Left 2017    wrist repair    HX OTHER SURGICAL      multiple sx right leg and foot         Review of Systems   Constitutional: Positive for malaise/fatigue. Negative for chills, fever and weight loss. HENT: Negative for congestion and sore throat. Eyes: Negative. Negative for blurred vision and double vision. Respiratory: Negative. Negative for cough. Cardiovascular: Negative. Negative for leg swelling. Gastrointestinal: Positive for constipation, heartburn and nausea. Negative for diarrhea. Genitourinary: Negative. Musculoskeletal: Positive for myalgias. Negative for falls. Skin: Negative. Neurological: Negative for dizziness, tremors, sensory change, seizures and weakness. Endo/Heme/Allergies: Negative. Negative for environmental allergies. Does not bruise/bleed easily. Psychiatric/Behavioral: Positive for depression. Negative for suicidal ideas. The patient is nervous/anxious and has insomnia. Physical Exam   Constitutional: She is oriented to person, place, and time. She appears well-developed and well-nourished. HENT:   Head: Normocephalic. Eyes: EOM are normal.   Neck: No thyromegaly present. Pulmonary/Chest: Effort normal. No respiratory distress. Musculoskeletal: She exhibits tenderness. Antalgic gait using a walker. Neurological: She is alert and oriented to person, place, and time. She has normal reflexes. Gait abnormal.   Skin: Skin is warm and dry. Psychiatric: She has a normal mood and affect.  Her behavior is normal. Judgment and thought content normal.   Nursing note and vitals reviewed. ASSESSMENT:    Encounter Diagnoses   Name Primary?  Rheumatoid arthritis involving multiple sites with positive rheumatoid factor (HCC)     Chronic pain syndrome     Pain in joint, multiple sites     Primary localized osteoarthrosis, lower leg, unspecified laterality         [place] Prescription Monitoring Program was reviewed which does not demonstrate aberrancies and/or inconsistencies with regard to the historical prescribing of controlled medications to this patient by other providers since her last visit. NOTE: She did receive oxycodone following a fall and wrist fracture filled on 2/6/2017. PLAN / Pt Instructions:  1. Continue current plan. Stable on current medication without adverse events. 2. Refill Norco 10/325 4 times a day as needed. An additional tablet can be taken as needed on bad days. NO more than #135/day. Continue with MiraLAX as needed  Add naloxone 4 mg nasal spray for opioid induced respiratory depression emergency only. Extensive counseling was provided regarding this medication. Instructions were given to take home  Discussed risks of addiction, dependency, and opioid induced hyperalgesia. Return to clinic in 3 months    Medications Ordered Today   Medications    HYDROcodone-acetaminophen (NORCO)  mg tablet     Sig: Take 1 Tab by mouth every six (6) hours as needed for Pain for up to 30 days. Max Daily Amount: 4 Tabs. May take an additional tablet on bad days not to exceed #135 per month. Indications: Pain     Dispense:  135 Tab     Refill:  0    HYDROcodone-acetaminophen (NORCO)  mg tablet     Sig: Take 1 Tab by mouth every six (6) hours as needed for Pain for up to 30 days. Max Daily Amount: 4 Tabs. May take an additional tablet on bad days not to exceed #135 per month.   Indications: Pain     Dispense:  135 Tab     Refill:  0    HYDROcodone-acetaminophen (NORCO)  mg tablet     Sig: Take 1 Tab by mouth every six (6) hours as needed for Pain for up to 30 days. Max Daily Amount: 4 Tabs. May take an additional tablet on bad days not to exceed #135 per month. Indications: Pain     Dispense:  135 Tab     Refill:  0       Pain medications prescribed with the objective of pain relief and improved physical and psychosocial function in this patient. Spent 25 minutes with patient today reviewing the treatment plan, goals of treatment plan, and limitations of the treatment plan, to include the potential for side effects from medications and procedures. Ariella Arreola 8/24/2017     Note: Please excuse any typographical errors. Voice recognition software was used for this note and may cause mistakes.

## 2017-11-14 ENCOUNTER — OFFICE VISIT (OUTPATIENT)
Dept: PAIN MANAGEMENT | Age: 77
End: 2017-11-14

## 2017-11-14 VITALS
WEIGHT: 179 LBS | BODY MASS INDEX: 31.71 KG/M2 | SYSTOLIC BLOOD PRESSURE: 138 MMHG | HEIGHT: 63 IN | TEMPERATURE: 98.4 F | DIASTOLIC BLOOD PRESSURE: 72 MMHG | RESPIRATION RATE: 13 BRPM | HEART RATE: 52 BPM

## 2017-11-14 DIAGNOSIS — Z79.899 ENCOUNTER FOR LONG-TERM (CURRENT) USE OF MEDICATIONS: Primary | ICD-10-CM

## 2017-11-14 DIAGNOSIS — M05.79 RHEUMATOID ARTHRITIS INVOLVING MULTIPLE SITES WITH POSITIVE RHEUMATOID FACTOR (HCC): ICD-10-CM

## 2017-11-14 DIAGNOSIS — M25.50 PAIN IN JOINT, MULTIPLE SITES: ICD-10-CM

## 2017-11-14 DIAGNOSIS — M17.10 PRIMARY LOCALIZED OSTEOARTHROSIS OF LOWER LEG, UNSPECIFIED LATERALITY: ICD-10-CM

## 2017-11-14 DIAGNOSIS — G89.4 CHRONIC PAIN SYNDROME: ICD-10-CM

## 2017-11-14 DIAGNOSIS — M15.9 PRIMARY OSTEOARTHRITIS INVOLVING MULTIPLE JOINTS: ICD-10-CM

## 2017-11-14 RX ORDER — HYDROCODONE BITARTRATE AND ACETAMINOPHEN 10; 325 MG/1; MG/1
1 TABLET ORAL
Qty: 120 TAB | Refills: 0 | Status: SHIPPED | OUTPATIENT
Start: 2018-01-26 | End: 2017-11-14 | Stop reason: SDUPTHER

## 2017-11-14 RX ORDER — HYDROGEN PEROXIDE 3 %
20 SOLUTION, NON-ORAL MISCELLANEOUS DAILY
COMMUNITY
End: 2022-01-30

## 2017-11-14 RX ORDER — HYDROCODONE BITARTRATE AND ACETAMINOPHEN 10; 325 MG/1; MG/1
1 TABLET ORAL
Qty: 120 TAB | Refills: 0 | Status: SHIPPED | OUTPATIENT
Start: 2017-12-27 | End: 2017-11-14 | Stop reason: SDUPTHER

## 2017-11-14 RX ORDER — HYDROCODONE BITARTRATE AND ACETAMINOPHEN 10; 325 MG/1; MG/1
1 TABLET ORAL
Qty: 120 TAB | Refills: 0 | Status: SHIPPED | OUTPATIENT
Start: 2017-11-28 | End: 2017-12-28

## 2017-11-14 RX ORDER — HYDROCODONE BITARTRATE AND ACETAMINOPHEN 10; 325 MG/1; MG/1
1 TABLET ORAL
Qty: 120 TAB | Refills: 0 | Status: SHIPPED | OUTPATIENT
Start: 2018-01-26 | End: 2018-02-25

## 2017-11-14 RX ORDER — OXYMORPHONE HYDROCHLORIDE 5 MG/1
TABLET, FILM COATED, EXTENDED RELEASE ORAL
Qty: 30 TAB | Refills: 0 | Status: SHIPPED | OUTPATIENT
Start: 2017-11-14 | End: 2022-01-30

## 2017-11-14 RX ORDER — OXYMORPHONE HYDROCHLORIDE 5 MG/1
TABLET, FILM COATED, EXTENDED RELEASE ORAL
Qty: 30 TAB | Refills: 0 | Status: SHIPPED | OUTPATIENT
Start: 2017-12-13 | End: 2018-02-08 | Stop reason: SDUPTHER

## 2017-11-14 RX ORDER — HYDROCODONE BITARTRATE AND ACETAMINOPHEN 10; 325 MG/1; MG/1
1 TABLET ORAL
Qty: 120 TAB | Refills: 0 | Status: SHIPPED | OUTPATIENT
Start: 2017-12-27 | End: 2018-01-26

## 2017-11-14 RX ORDER — OXYMORPHONE HYDROCHLORIDE 5 MG/1
TABLET, FILM COATED, EXTENDED RELEASE ORAL
Qty: 30 TAB | Refills: 0 | Status: SHIPPED | OUTPATIENT
Start: 2018-01-12 | End: 2018-02-08 | Stop reason: SDUPTHER

## 2017-11-14 RX ORDER — HYDROCODONE BITARTRATE AND ACETAMINOPHEN 10; 325 MG/1; MG/1
1 TABLET ORAL
Qty: 120 TAB | Refills: 0 | Status: SHIPPED | OUTPATIENT
Start: 2017-11-28 | End: 2017-11-14 | Stop reason: SDUPTHER

## 2017-11-14 RX ORDER — AMLODIPINE BESYLATE 5 MG/1
5 TABLET ORAL DAILY
COMMUNITY

## 2017-11-14 NOTE — MR AVS SNAPSHOT
Visit Information Date & Time Provider Department Dept. Phone Encounter #  
 11/14/2017  2:00 PM Merton Kanner, [facility] [phone] Follow-up Instructions Return in about 3 months (around 2/14/2018). Upcoming Health Maintenance Date Due DTaP/Tdap/Td series (1 - Tdap) 7/18/1961 ZOSTER VACCINE AGE 60> 5/18/2000 GLAUCOMA SCREENING Q2Y 7/18/2005 Pneumococcal 65+ Low/Medium Risk (1 of 2 - PCV13) 7/18/2005 MEDICARE YEARLY EXAM 7/18/2005 Influenza Age 5 to Adult 8/1/2017 Allergies as of 11/14/2017  Review Complete On: 11/14/2017 By: Merton Kanner, PA Severity Noted Reaction Type Reactions Neurontin [Gabapentin]  05/12/2015    Other (comments) Current Immunizations  Never Reviewed No immunizations on file. Not reviewed this visit You Were Diagnosed With   
  
 Codes Comments Encounter for long-term (current) use of medications    -  Primary ICD-10-CM: M84.174 ICD-9-CM: V58.69 Rheumatoid arthritis involving multiple sites with positive rheumatoid factor (HCC)     ICD-10-CM: M05.79 ICD-9-CM: 714.0 Primary osteoarthritis involving multiple joints     ICD-10-CM: M15.0 ICD-9-CM: 715.09 Chronic pain syndrome     ICD-10-CM: G89.4 ICD-9-CM: 338.4 Pain in joint, multiple sites     ICD-10-CM: M25.50 ICD-9-CM: 719.49 Primary localized osteoarthrosis of lower leg, unspecified laterality     ICD-10-CM: M17.10 ICD-9-CM: 715.16 Vitals BP Pulse Temp Resp Height(growth percentile) Weight(growth percentile) 138/72 (!) 52 98.4 °F (36.9 °C) 13 5' 3\" (1.6 m) 179 lb (81.2 kg) BMI OB Status Smoking Status 31.71 kg/m2 Postmenopausal Former Smoker Vitals History BMI and BSA Data Body Mass Index Body Surface Area 31.71 kg/m 2 1.9 m 2 Preferred Pharmacy Pharmacy Name Phone [id number], [address] [place] [address] Katie Couch. [phone] Your Updated Medication List  
  
   
This list is accurate as of: 11/14/17  3:34 PM.  Always use your most recent med list.  
  
  
  
  
 atorvastatin 10 mg tablet Commonly known as:  LIPITOR Take  by mouth daily. Calcium-Cholecalciferol (D3) 600 mg(1,500mg) -400 unit Cap Take  by mouth. CARDIZEM 120 mg tablet Generic drug:  dilTIAZem Take 120 mg by mouth daily. * HYDROcodone-acetaminophen  mg tablet Commonly known as:  Julaine Kava Take 1 Tab by mouth four (4) times daily as needed for Pain for up to 30 days. Max Daily Amount: 4 Tabs. May take an additional tablet on bad days not to exceed #135 per month. Indications: Pain Start taking on:  11/28/2017  
  
 * HYDROcodone-acetaminophen  mg tablet Commonly known as:  Julaine Kava Take 1 Tab by mouth four (4) times daily as needed for Pain for up to 30 days. Max Daily Amount: 4 Tabs. May take an additional tablet on bad days not to exceed #135 per month. Indications: Pain Start taking on:  12/27/2017  
  
 * HYDROcodone-acetaminophen  mg tablet Commonly known as:  Julaine Kava Take 1 Tab by mouth four (4) times daily as needed for Pain for up to 30 days. Max Daily Amount: 4 Tabs. May take an additional tablet on bad days not to exceed #135 per month. Indications: Pain Start taking on:  1/26/2018 LATUDA 20 mg Tab tablet Generic drug:  lurasidone Take 40 mg by mouth. NexIUM 20 mg capsule Generic drug:  esomeprazole Take 20 mg by mouth daily. NORVASC 5 mg tablet Generic drug:  amLODIPine Take 5 mg by mouth daily. ondansetron 4 mg disintegrating tablet Commonly known as:  ZOFRAN ODT Take 4 mg by mouth every eight (8) hours as needed for Nausea. Indications: ACUTE GASTROENTERITIS-RELATED VOMITING IN PEDIATRICS  
  
 * oxyMORphone 5 mg ER tablet Commonly known as:  OPANA SR  
 Take one tablet at bedtime for chronic, severe pain * oxyMORphone 5 mg ER tablet Commonly known as:  OPANA SR Take one tablet at bedtime for chronic, severe pain Start taking on:  12/13/2017 * oxyMORphone 5 mg ER tablet Commonly known as:  OPANA SR Take one tablet at bedtime for chronic, severe pain Start taking on:  1/12/2018  
  
 polyethylene glycol 17 gram packet Commonly known as:  Lelan Situ Take 1 Packet by mouth daily. PROLIA 60 mg/mL injection Generic drug:  denosumab 60 mg by SubCUTAneous route. promethazine 25 mg tablet Commonly known as:  PHENERGAN Take 1 Tab by mouth every six (6) hours as needed for Nausea. senna-docusate 8.6-50 mg per tablet Commonly known as:  Breana  Take 1 Tab by mouth daily. traZODone 50 mg tablet Commonly known as:  [place] Take 150 mg by mouth nightly. Indications: INSOMNIA  
  
 triamcinolone acetonide 0.1 % ointment Commonly known as:  KENALOG Apply  to affected area. use thin layer WELLBUTRIN  mg tablet Generic drug:  buPROPion XL Take 300 mg by mouth every morning. XANAX 1 mg tablet Generic drug:  ALPRAZolam  
Take 0.5 mg by mouth as needed. * Notice: This list has 6 medication(s) that are the same as other medications prescribed for you. Read the directions carefully, and ask your doctor or other care provider to review them with you. Prescriptions Printed Refills HYDROcodone-acetaminophen (NORCO)  mg tablet 0 Starting on: 11/28/2017 Sig: Take 1 Tab by mouth four (4) times daily as needed for Pain for up to 30 days. Max Daily Amount: 4 Tabs. May take an additional tablet on bad days not to exceed #135 per month. Indications: Pain Class: Print Route: Oral  
 HYDROcodone-acetaminophen (NORCO)  mg tablet 0 Starting on: 12/27/2017  Sig: Take 1 Tab by mouth four (4) times daily as needed for Pain for up to 30 days. Max Daily Amount: 4 Tabs. May take an additional tablet on bad days not to exceed #135 per month. Indications: Pain Class: Print Route: Oral  
 HYDROcodone-acetaminophen (NORCO)  mg tablet 0 Starting on: 1/26/2018 Sig: Take 1 Tab by mouth four (4) times daily as needed for Pain for up to 30 days. Max Daily Amount: 4 Tabs. May take an additional tablet on bad days not to exceed #135 per month. Indications: Pain Class: Print Route: Oral  
 oxyMORphone 5 mg PO ER tablet 0 Sig: Take one tablet at bedtime for chronic, severe pain  
 Class: Print  
 oxyMORphone 5 mg PO ER tablet 0 Starting on: 12/13/2017 Sig: Take one tablet at bedtime for chronic, severe pain  
 Class: Print  
 oxyMORphone 5 mg PO ER tablet 0 Starting on: 1/12/2018 Sig: Take one tablet at bedtime for chronic, severe pain  
 Class: Print We Performed the Following AMB POC DRUG SCREEN () [ [facility]] DRUG SCREEN [[id number] Custom] Follow-up Instructions Return in about 3 months (around 2/14/2018). Patient Instructions 1. Continue current plan. Stable on current medication without adverse events. 2. Refill and adjust Norco 10/325 mg. Take 1 tablet up to 34 times daily as needed. 3. Add oxymorphone ER 5 mg once nightly at bedtime. 4. Continue with MiraLAX as needed Add naloxone 4 mg nasal spray for opioid induced respiratory depression emergency only. Discussed risks of addiction, dependency, and opioid induced hyperalgesia. Return to clinic in 3 months Introducing [facility]! Dear Horace Gitelman: 
Thank you for requesting a Settle account. Our records indicate that you already have an active Settle account. You can access your account anytime at [email or web address] Did you know that you can access your hospital and ER discharge instructions at any time in Settle?   You can also review all of your test results from your hospital stay or ER visit. Additional Information If you have questions, please visit the Frequently Asked Questions section of the [email or web address] website at [email or web address] Remember, meQuilibrium is NOT to be used for urgent needs. For medical emergencies, dial 911. Now available from your iPhone and Android! Please provide this summary of care documentation to your next provider. Your primary care clinician is listed as [phone] . If you have any questions after today's visit, please call [phone].

## 2017-11-14 NOTE — PROGRESS NOTES
Nursing Notes    Patient presents to the office today in follow-up. Patient rates her pain at 5/10 on the numerical pain scale. Reviewed medications with counts as follows:    Rx Date filled Qty Dispensed Pill Count Last Dose Short   norco 10/325 mg  10/29/17 135 56 today no                                  POC UDS was performed in office today. Any new labs or imaging since last appointment? NO    Have you been to an emergency room (ER) or urgent care clinic since your last visit? NO            Have you been hospitalized since your last visit? NO     If yes, where, when, and reason for visit? Have you seen or consulted any other health care providers outside of the [address] [place] [address]  since your last visit? NO     If yes, where, when, and reason for visit? HM deferred to pcp.

## 2017-11-14 NOTE — ACP (ADVANCE CARE PLANNING)
The pt states that she has been talking to her pcp about a DNR. She has not completed this yet. The pt was asked to bring in a copy of this once completed so that it can be scanned into her chart. She verbalized understanding and has no questions.

## 2017-11-14 NOTE — PATIENT INSTRUCTIONS
1. Continue current plan. Stable on current medication without adverse events. 2. Refill and adjust Norco 10/325 mg. Take 1 tablet up to 3-4 times daily as needed. 3. Add oxymorphone ER 5 mg once nightly at bedtime. 4. Continue with MiraLAX as needed  Add naloxone 4 mg nasal spray for opioid induced respiratory depression emergency only. Discussed risks of addiction, dependency, and opioid induced hyperalgesia.   Return to clinic in 3 months

## 2017-11-14 NOTE — PROGRESS NOTES
HISTORY OF PRESENT ILLNESS  Claudia Atkinson is a 68 y.o. female. HPI She returns for followup of chronic, severe pain which is widespread and polyarticular related to rheumatoid arthritis. She is s/p left TKA. Prior medications include Oxycontin and Zohydro with no improvement. Fentanyl patch caused rash and nausea. Morphine with no help. She is s/p open reduction and internal fixation by Dr. Joanna Rich on 2/8/2017. Ms. Rico Maguire complains of worsening pain since her last visit. She had been previously doing very well with her current treatment plan. We recently adjusted her medication to allow for an extra 15 tablets per day to use at night on an as-needed basis during worsening days. She reports that this is been very effective but recently she has been experiencing some worsening arthritic pain. She is currently scheduled to follow-up with rheumatologist for further evaluation and recommendation she reports that her pain is worse at night and in the morning. I have recommended that we add a long-acting medication to use at night to last her through until morning. We will back her hydrocodone down to use 3-4 times daily on an as-needed basis only. She has already tried OxyContin, Zohydro, morphine, and fentanyl patches in the past with no improvement and/or side effects. She has never tried oxymorphone. We will try low-dose oxymorphone ER 5 mg once nightly at bedtime. I will have her follow-up in 3 months or sooner if needed for further evaluation and recommendation. Medications are helping with pain control and quality of life. Her pain is 3-4/10 with medication and 9/10 without. Pt describes pain as aching. Walking and house chores aggravates her pain.  Her pain is improved with medication and rest. Current treatment is helping to improve general activity, mood, walking, sleep, enjoyment of life    Pt does report constipation but is currently under control with conservative treatment and MiraLAX  She  is otherwise doing well with no other complaints today. She denies any adverse events including nausea, vomiting, dizziness,  hallucinations, or seizures. Because the patient's current regimen places him/her at increased risk for possible overdose, a prescription for naloxone nasal spray is being provided. The patient understands that this medication is only to be used in the setting of a possible overdose and that inadvertent use of this medication could precipitate overt withdrawal.       Allergies   Allergen Reactions    Neurontin [Gabapentin] Other (comments)       Past Surgical History:   Procedure Laterality Date    FOOT/TOES SURGERY PROC UNLISTED      HX BREAST AUGMENTATION  1996    Saline Implants    HX CHOLECYSTECTOMY      HX GI  2012    LOW ANTERIOR RESECTION WITH COLOSTOMY AND LEFT OOPHORECTOMY    HX KNEE ARTHROSCOPY Right     HX MASTECTOMY  1996    bilateral mastectomy    HX ORTHOPAEDIC      RIGHT FOOT AND ANKLE SX    HX ORTHOPAEDIC Left 2017    wrist repair    HX OTHER SURGICAL      multiple sx right leg and foot         Review of Systems   Constitutional: Positive for malaise/fatigue. Negative for chills. HENT: Negative for congestion and sore throat. Eyes: Negative. Negative for blurred vision and double vision. Respiratory: Negative. Negative for cough. Cardiovascular: Negative. Negative for leg swelling. Gastrointestinal: Positive for constipation, heartburn and nausea. Negative for diarrhea. Genitourinary: Negative. Musculoskeletal: Positive for myalgias. Negative for falls. Skin: Negative. Neurological: Negative for dizziness, tremors, sensory change and seizures. Endo/Heme/Allergies: Negative. Negative for environmental allergies. Does not bruise/bleed easily. Psychiatric/Behavioral: Positive for depression. Negative for suicidal ideas. The patient is nervous/anxious and has insomnia.                 Physical Exam   Constitutional: She is oriented to person, place, and time. She appears well-developed and well-nourished. HENT:   Head: Normocephalic. Eyes: EOM are normal.   Neck: No thyromegaly present. Pulmonary/Chest: Effort normal. No respiratory distress. Musculoskeletal: She exhibits tenderness. Antalgic gait using a walker. Neurological: She is alert and oriented to person, place, and time. She has normal reflexes. Gait abnormal.   Skin: Skin is warm and dry. Psychiatric: She has a normal mood and affect. Her behavior is normal. Judgment and thought content normal.   Nursing note and vitals reviewed. ASSESSMENT:    Encounter Diagnoses   Name Primary?  Rheumatoid arthritis involving multiple sites with positive rheumatoid factor (HCC)     Primary osteoarthritis involving multiple joints     Chronic pain syndrome     Pain in joint, multiple sites     Primary localized osteoarthrosis of lower leg, unspecified laterality     Encounter for long-term (current) use of medications Yes        [place] Prescription Monitoring Program was reviewed which does not demonstrate aberrancies and/or inconsistencies with regard to the historical prescribing of controlled medications to this patient by other providers since her last visit. NOTE: She did receive oxycodone following a fall and wrist fracture filled on 2/6/2017. PLAN / Pt Instructions:  1. Continue current plan. Stable on current medication without adverse events. 2. Refill and adjust Norco 10/325 mg. Take 1 tablet up to 3-4 times daily as needed. 3. Add oxymorphone ER 5 mg once nightly at bedtime. 4. Continue with MiraLAX as needed  Add naloxone 4 mg nasal spray for opioid induced respiratory depression emergency only. Discussed risks of addiction, dependency, and opioid induced hyperalgesia.   Return to clinic in 3 months      Medications Ordered Today   Medications    DISCONTD: HYDROcodone-acetaminophen (NORCO)  mg tablet     Sig: Take 1 Tab by mouth four (4) times daily as needed for Pain for up to 30 days. Max Daily Amount: 4 Tabs. May take an additional tablet on bad days not to exceed #135 per month. Indications: Pain     Dispense:  120 Tab     Refill:  0    DISCONTD: HYDROcodone-acetaminophen (NORCO)  mg tablet     Sig: Take 1 Tab by mouth four (4) times daily as needed for Pain for up to 30 days. Max Daily Amount: 4 Tabs. May take an additional tablet on bad days not to exceed #135 per month. Indications: Pain     Dispense:  120 Tab     Refill:  0    DISCONTD: HYDROcodone-acetaminophen (NORCO)  mg tablet     Sig: Take 1 Tab by mouth four (4) times daily as needed for Pain for up to 30 days. Max Daily Amount: 4 Tabs. May take an additional tablet on bad days not to exceed #135 per month. Indications: Pain     Dispense:  120 Tab     Refill:  0    oxyMORphone 5 mg PO ER tablet     Sig: Take one tablet at bedtime for chronic, severe pain     Dispense:  30 Tab     Refill:  0    oxyMORphone 5 mg PO ER tablet     Sig: Take one tablet at bedtime for chronic, severe pain     Dispense:  30 Tab     Refill:  0    oxyMORphone 5 mg PO ER tablet     Sig: Take one tablet at bedtime for chronic, severe pain     Dispense:  30 Tab     Refill:  0    HYDROcodone-acetaminophen (NORCO)  mg tablet     Sig: Take 1 Tab by mouth four (4) times daily as needed for Pain for up to 30 days. Max Daily Amount: 4 Tabs. Indications: Pain     Dispense:  120 Tab     Refill:  0    HYDROcodone-acetaminophen (NORCO)  mg tablet     Sig: Take 1 Tab by mouth four (4) times daily as needed for Pain for up to 30 days. Max Daily Amount: 4 Tabs. Indications: Pain     Dispense:  120 Tab     Refill:  0    HYDROcodone-acetaminophen (NORCO)  mg tablet     Sig: Take 1 Tab by mouth four (4) times daily as needed for Pain for up to 30 days. Max Daily Amount: 4 Tabs.  Indications: Pain     Dispense:  120 Tab     Refill:  0       Pain medications prescribed with the objective of pain relief and improved physical and psychosocial function in this patient. Spent 25 minutes with patient today reviewing the treatment plan, goals of treatment plan, and limitations of the treatment plan, to include the potential for side effects from medications and procedures. Ariella Felix 11/14/2017     Note: Please excuse any typographical errors. Voice recognition software was used for this note and may cause mistakes.

## 2018-02-08 ENCOUNTER — OFFICE VISIT (OUTPATIENT)
Dept: PAIN MANAGEMENT | Age: 78
End: 2018-02-08

## 2018-02-08 VITALS
TEMPERATURE: 98 F | RESPIRATION RATE: 14 BRPM | WEIGHT: 179 LBS | BODY MASS INDEX: 31.71 KG/M2 | SYSTOLIC BLOOD PRESSURE: 151 MMHG | HEART RATE: 100 BPM | DIASTOLIC BLOOD PRESSURE: 76 MMHG | HEIGHT: 63 IN

## 2018-02-08 DIAGNOSIS — M25.50 PAIN IN JOINT, MULTIPLE SITES: ICD-10-CM

## 2018-02-08 DIAGNOSIS — M79.10 MYALGIA: ICD-10-CM

## 2018-02-08 DIAGNOSIS — M79.601 PAIN OF RIGHT UPPER EXTREMITY: ICD-10-CM

## 2018-02-08 DIAGNOSIS — M17.10 PRIMARY LOCALIZED OSTEOARTHROSIS OF LOWER LEG, UNSPECIFIED LATERALITY: ICD-10-CM

## 2018-02-08 DIAGNOSIS — G89.4 CHRONIC PAIN SYNDROME: ICD-10-CM

## 2018-02-08 DIAGNOSIS — M05.79 RHEUMATOID ARTHRITIS INVOLVING MULTIPLE SITES WITH POSITIVE RHEUMATOID FACTOR (HCC): ICD-10-CM

## 2018-02-08 RX ORDER — HYDROCODONE BITARTRATE AND ACETAMINOPHEN 10; 325 MG/1; MG/1
1 TABLET ORAL
Qty: 135 TAB | Refills: 0 | Status: SHIPPED | OUTPATIENT
Start: 2018-02-25 | End: 2018-03-27

## 2018-02-08 RX ORDER — HYDROCODONE BITARTRATE AND ACETAMINOPHEN 10; 325 MG/1; MG/1
1 TABLET ORAL
Qty: 135 TAB | Refills: 0 | Status: SHIPPED | OUTPATIENT
Start: 2018-04-25 | End: 2018-05-10 | Stop reason: SDUPTHER

## 2018-02-08 RX ORDER — HYDROCODONE BITARTRATE AND ACETAMINOPHEN 10; 325 MG/1; MG/1
1 TABLET ORAL
Qty: 120 TAB | Refills: 0 | Status: SHIPPED | OUTPATIENT
Start: 2018-02-25 | End: 2018-02-08 | Stop reason: CLARIF

## 2018-02-08 RX ORDER — HYDROCODONE BITARTRATE AND ACETAMINOPHEN 10; 325 MG/1; MG/1
1 TABLET ORAL
Qty: 135 TAB | Refills: 0 | Status: SHIPPED | OUTPATIENT
Start: 2018-03-26 | End: 2018-04-25

## 2018-02-08 NOTE — PATIENT INSTRUCTIONS
1. Continue current plan. Stable on current medication without adverse events. 2. Refill and adjust Norco 10/325 mg. Take 1 tablet up to 4 times daily as needed. May take an occasional extra tablet not to exceed 135 per month. 3. Discontinue oxymorphone ER 5 mg. Patient was provided education on proper medication disposal options as indicated by the FDA/RILEY. Pt was also advised that failing to properly dispose of medications that are no longer part of his/her regimen would be considered non-compliance with the treatment plan. 4. Unfilled prescription for oxymorphone 5 mg dated to fill January 12 kept in office today and destroyed. 5. Continue with MiraLAX as needed  Naloxone 4 mg nasal spray for opioid induced respiratory depression emergency only. Discussed risks of addiction, dependency, and opioid induced hyperalgesia.   Return to clinic in 3 months

## 2018-02-08 NOTE — MR AVS SNAPSHOT
[address] [facility] [address] 
[phone] Patient: Luba Copeland MRN: [id number] Visit Information Date & Time Provider Department Dept. Phone Encounter #  
 2/8/2018  2:00 PM Zeferino Mcgarry, [address] for Pain Management [id number] Follow-up Instructions Return in about 3 months (around 5/8/2018). Upcoming Health Maintenance Date Due DTaP/Tdap/Td series (1 - Tdap) 7/18/1961 ZOSTER VACCINE AGE 60> 5/18/2000 GLAUCOMA SCREENING Q2Y 7/18/2005 Pneumococcal 65+ Low/Medium Risk (1 of 2 - PCV13) 7/18/2005 MEDICARE YEARLY EXAM 7/18/2005 Influenza Age 5 to Adult 8/1/2017 Allergies as of 2/8/2018  Review Complete On: 2/8/2018 By: GURJIT Ferrer Severity Noted Reaction Type Reactions Neurontin [Gabapentin]  05/12/2015    Other (comments) Current Immunizations  Never Reviewed No immunizations on file. Not reviewed this visit You Were Diagnosed With   
  
 Codes Comments Rheumatoid arthritis involving multiple sites with positive rheumatoid factor (HCC)     ICD-10-CM: M05.79 ICD-9-CM: 714.0 Pain of right upper extremity     ICD-10-CM: M79.601 ICD-9-CM: 729.5 Myalgia     ICD-10-CM: M79.1 ICD-9-CM: 729.1 Chronic pain syndrome     ICD-10-CM: G89.4 ICD-9-CM: 338.4 Pain in joint, multiple sites     ICD-10-CM: M25.50 ICD-9-CM: 719.49 Primary localized osteoarthrosis of lower leg, unspecified laterality     ICD-10-CM: M17.10 ICD-9-CM: 715.16 Vitals BP Pulse Temp Resp Height(growth percentile) Weight(growth percentile) 151/76 (BP 1 Location: Left arm, BP Patient Position: Sitting) 100 98 °F (36.7 °C) (Oral) 14 5' 3\" (1.6 m) 179 lb (81.2 kg) BMI OB Status Smoking Status 31.71 kg/m2 Postmenopausal Former Smoker Vitals History BMI and BSA Data Body Mass Index Body Surface Area 31.71 kg/m 2 1.9 m 2 Preferred Pharmacy Pharmacy Name Phone [id number] - [place], [address] [place], [place] [address]lianet Couch. [phone] Your Updated Medication List  
  
   
This list is accurate as of: 2/8/18  3:04 PM.  Always use your most recent med list.  
  
  
  
  
 atorvastatin 10 mg tablet Commonly known as:  LIPITOR Take  by mouth daily. Calcium-Cholecalciferol (D3) 600 mg(1,500mg) -400 unit Cap Take  by mouth. CARDIZEM 120 mg tablet Generic drug:  dilTIAZem Take 120 mg by mouth daily. * HYDROcodone-acetaminophen  mg tablet Commonly known as:  [address] Sil[address] Take 1 Tab by mouth four (4) times daily as needed for Pain for up to 30 days. Max Daily Amount: 4 Tabs. Indications: Pain  
  
 * HYDROcodone-acetaminophen  mg tablet Commonly known as:  [address] Silvestre Take 1 Tab by mouth four (4) times daily as needed for Pain for up to 30 days. Max Daily Amount: 4 Tabs. May take an additional tablet on bad days not to exceed #135 per month. Indications: Pain Start taking on:  2/25/2018  
  
 * HYDROcodone-acetaminophen  mg tablet Commonly known as:  [address] Silvestre Take 1 Tab by mouth every six (6) hours as needed for Pain for up to 30 days. Max Daily Amount: 4 Tabs. May take an additional tablet on bad days not to exceed #135 per month. Indications: Pain Start taking on:  3/26/2018  
  
 * HYDROcodone-acetaminophen  mg tablet Commonly known as:  [address] Sil[address] Take 1 Tab by mouth every six (6) hours as needed for Pain for up to 30 days. Max Daily Amount: 4 Tabs. May take an additional tablet on bad days not to exceed #135 per month. Indications: Pain Start taking on:  4/25/2018 LATUDA 20 mg Tab tablet Generic drug:  lurasidone Take 40 mg by mouth. NexIUM 20 mg capsule Generic drug:  esomeprazole Take 20 mg by mouth daily. NORVASC 5 mg tablet Generic drug:  amLODIPine Take 5 mg by mouth daily. ondansetron 4 mg disintegrating tablet Commonly known as:  ZOFRAN ODT Take 4 mg by mouth every eight (8) hours as needed for Nausea. Indications: ACUTE GASTROENTERITIS-RELATED VOMITING IN PEDIATRICS  
  
 oxyMORphone 5 mg ER tablet Commonly known as:  OPANA SR Take one tablet at bedtime for chronic, severe pain  
  
 polyethylene glycol 17 gram packet Commonly known as:  Ari Manuel Take 1 Packet by mouth daily. PROLIA 60 mg/mL injection Generic drug:  denosumab 60 mg by SubCUTAneous route. promethazine 25 mg tablet Commonly known as:  PHENERGAN Take 1 Tab by mouth every six (6) hours as needed for Nausea. senna-docusate 8.6-50 mg per tablet Commonly known as:  Clarissa Roes Take 1 Tab by mouth daily. traZODone 50 mg tablet Commonly known as:  Ted Bristle Take 150 mg by mouth nightly. Indications: INSOMNIA  
  
 triamcinolone acetonide 0.1 % ointment Commonly known as:  KENALOG Apply  to affected area. use thin layer WELLBUTRIN  mg tablet Generic drug:  buPROPion XL Take 300 mg by mouth every morning. XANAX 1 mg tablet Generic drug:  ALPRAZolam  
Take 0.5 mg by mouth as needed. * Notice: This list has 4 medication(s) that are the same as other medications prescribed for you. Read the directions carefully, and ask your doctor or other care provider to review them with you. Prescriptions Printed Refills HYDROcodone-acetaminophen (NORCO)  mg tablet 0 Starting on: 3/26/2018 Sig: Take 1 Tab by mouth every six (6) hours as needed for Pain for up to 30 days. Max Daily Amount: 4 Tabs. May take an additional tablet on bad days not to exceed #135 per month. Indications: Pain Class: Print Route: Oral  
 HYDROcodone-acetaminophen (NORCO)  mg tablet 0 Starting on: 4/25/2018  Sig: Take 1 Tab by mouth every six (6) hours as needed for Pain for up to 30 days. Max Daily Amount: 4 Tabs. May take an additional tablet on bad days not to exceed #135 per month. Indications: Pain Class: Print Route: Oral  
 HYDROcodone-acetaminophen (NORCO)  mg tablet 0 Starting on: 2/25/2018 Sig: Take 1 Tab by mouth four (4) times daily as needed for Pain for up to 30 days. Max Daily Amount: 4 Tabs. May take an additional tablet on bad days not to exceed #135 per month. Indications: Pain Class: Print Route: Oral  
  
Follow-up Instructions Return in about 3 months (around 5/8/2018). Patient Instructions 1. Continue current plan. Stable on current medication without adverse events. 2. Refill and adjust Norco 10/325 mg. Take 1 tablet up to 4 times daily as needed. May take an occasional extra tablet not to exceed 135 per month. 3. Discontinue oxymorphone ER 5 mg. Patient was provided education on proper medication disposal options as indicated by the FDA/RILEY. Pt was also advised that failing to properly dispose of medications that are no longer part of his/her regimen would be considered non-compliance with the treatment plan. 4. Unfilled prescription for oxymorphone 5 mg dated to fill January 12 kept in office today and destroyed. 5. Continue with MiraLAX as needed Naloxone 4 mg nasal spray for opioid induced respiratory depression emergency only. Discussed risks of addiction, dependency, and opioid induced hyperalgesia. Return to clinic in 3 months Introducing hospitals & HEALTH SERVICES! Dear Kim Ramirez: 
Thank you for requesting a [employer] account. Our records indicate that you already have an active [employer] account. You can access your account anytime at [email or web address] Did you know that you can access your hospital and ER discharge instructions at any time in M/[employer]? You can also review all of your test results from your hospital stay or ER visit. Additional Information If you have questions, please visit the Frequently Asked Questions section of the [employer] website at [email or web address] Remember, "[employer] is NOT to be used for urgent needs. For medical emergencies, dial 911. Now available from your iPhone and Android! Please provide this summary of care documentation to your next provider. Your primary care clinician is listed as [address] If you have any questions after today's visit, please call [phone].

## 2018-02-08 NOTE — PROGRESS NOTES
Nursing Notes    Patient presents to the office today in follow-up. Patient rates her pain at 5/10 on the numerical pain scale. Reviewed medications with counts as follows:    Rx Date filled Qty Dispensed Pill Count Last Dose Short   Oxymorphone ER 12/24/17 30 9+1 Rx Weeks ago No    norco 10-325mg 01/26/18 120 58 today no                                  Comments:     POC UDS was not performed in office today    Any new labs or imaging since last appointment? NO    Have you been to an emergency room (ER) or urgent care clinic since your last visit? NO            Have you been hospitalized since your last visit? NO     If yes, where, when, and reason for visit? Have you seen or consulted any other health care providers outside of the [address] [place] [address]  since your last visit? NO     If yes, where, when, and reason for visit? HM deferred to pcp.

## 2018-05-10 ENCOUNTER — OFFICE VISIT (OUTPATIENT)
Dept: PAIN MANAGEMENT | Age: 78
End: 2018-05-10

## 2018-05-10 VITALS
DIASTOLIC BLOOD PRESSURE: 75 MMHG | HEIGHT: 63 IN | SYSTOLIC BLOOD PRESSURE: 121 MMHG | HEART RATE: 85 BPM | WEIGHT: 175 LBS | TEMPERATURE: 97.6 F | BODY MASS INDEX: 31.01 KG/M2

## 2018-05-10 DIAGNOSIS — M17.10 PRIMARY LOCALIZED OSTEOARTHROSIS OF LOWER LEG, UNSPECIFIED LATERALITY: ICD-10-CM

## 2018-05-10 DIAGNOSIS — M25.50 PAIN IN JOINT, MULTIPLE SITES: ICD-10-CM

## 2018-05-10 DIAGNOSIS — G89.4 CHRONIC PAIN SYNDROME: ICD-10-CM

## 2018-05-10 DIAGNOSIS — Z79.899 ENCOUNTER FOR LONG-TERM (CURRENT) USE OF HIGH-RISK MEDICATION: Primary | ICD-10-CM

## 2018-05-10 DIAGNOSIS — M05.79 RHEUMATOID ARTHRITIS INVOLVING MULTIPLE SITES WITH POSITIVE RHEUMATOID FACTOR (HCC): ICD-10-CM

## 2018-05-10 DIAGNOSIS — M15.9 PRIMARY OSTEOARTHRITIS INVOLVING MULTIPLE JOINTS: ICD-10-CM

## 2018-05-10 LAB
ALCOHOL UR POC: NORMAL
AMPHETAMINES UR POC: NEGATIVE
BARBITURATES UR POC: NORMAL
BENZODIAZEPINES UR POC: NORMAL
BUPRENORPHINE UR POC: NEGATIVE
CANNABINOIDS UR POC: NEGATIVE
CARISOPRODOL UR POC: NORMAL
COCAINE UR POC: NEGATIVE
FENTANYL UR POC: NORMAL
MDMA/ECSTASY UR POC: NORMAL
METHADONE UR POC: NEGATIVE
METHAMPHETAMINE UR POC: NORMAL
METHYLPHENIDATE UR POC: NORMAL
OPIATES UR POC: NORMAL
OXYCODONE UR POC: NORMAL
PHENCYCLIDINE UR POC: NORMAL
PROPOXYPHENE UR POC: NORMAL
TRAMADOL UR POC: NORMAL
TRICYCLICS UR POC: NORMAL

## 2018-05-10 RX ORDER — HYDROCODONE BITARTRATE AND ACETAMINOPHEN 10; 325 MG/1; MG/1
1 TABLET ORAL
Qty: 135 TAB | Refills: 0 | Status: SHIPPED | OUTPATIENT
Start: 2018-05-24 | End: 2018-06-23

## 2018-05-10 NOTE — MR AVS SNAPSHOT
[facility] [address] 
[phone] Patient: Radha Pringle MRN: [id number] Visit Information Date & Time Provider Department Dept. Phone Encounter #  
 5/10/2018  3:00 PM Radha Ibrahim, [address] for Pain Management [id number] Follow-up Instructions Return in about 3 months (around 8/10/2018). Upcoming Health Maintenance Date Due DTaP/Tdap/Td series (1 - Tdap) 7/18/1961 ZOSTER VACCINE AGE 60> 5/18/2000 GLAUCOMA SCREENING Q2Y 7/18/2005 Pneumococcal 65+ Low/Medium Risk (1 of 2 - PCV13) 7/18/2005 MEDICARE YEARLY EXAM 3/14/2018 Influenza Age 5 to Adult 8/1/2018 Allergies as of 5/10/2018  Review Complete On: 5/10/2018 By: GURJIT Cardona Severity Noted Reaction Type Reactions Neurontin [Gabapentin]  05/12/2015    Other (comments) Current Immunizations  Never Reviewed No immunizations on file. Not reviewed this visit You Were Diagnosed With   
  
 Codes Comments Encounter for long-term (current) use of high-risk medication    -  Primary ICD-10-CM: E69.812 ICD-9-CM: V58.69 Chronic pain syndrome     ICD-10-CM: G89.4 ICD-9-CM: 338.4 Primary localized osteoarthrosis of lower leg, unspecified laterality     ICD-10-CM: M17.10 ICD-9-CM: 715.16 Rheumatoid arthritis involving multiple sites with positive rheumatoid factor (HCC)     ICD-10-CM: M05.79 ICD-9-CM: 714.0 Primary osteoarthritis involving multiple joints     ICD-10-CM: M15.0 ICD-9-CM: 715.09 Pain in joint, multiple sites     ICD-10-CM: M25.50 ICD-9-CM: 719.49 Vitals BP Pulse Temp Height(growth percentile) Weight(growth percentile) BMI  
 121/75 (BP 1 Location: Left arm, BP Patient Position: Sitting) 85 97.6 °F (36.4 °C) 5' 3\" (1.6 m) 175 lb (79.4 kg) 31 kg/m2 OB Status Smoking Status Postmenopausal Former Smoker BMI and BSA Data Body Mass Index Body Surface Area  
 31 kg/m 2 1.88 m 2 Your Updated Medication List  
  
   
This list is accurate as of 5/10/18  5:00 PM.  Always use your most recent med list.  
  
  
  
  
 atorvastatin 10 mg tablet Commonly known as:  LIPITOR Take  by mouth daily. Calcium-Cholecalciferol (D3) 600 mg(1,500mg) -400 unit Cap Take  by mouth. CARDIZEM 120 mg tablet Generic drug:  dilTIAZem Take 120 mg by mouth daily. HYDROcodone-acetaminophen  mg tablet Commonly known as:  Maty Aguilar Take 1 Tab by mouth every six (6) hours as needed for Pain for up to 30 days. Max Daily Amount: 4 Tabs. May take an additional tablet on bad days not to exceed #135 per month. Indications: Pain Start taking on:  5/24/2018 LATUDA 20 mg Tab tablet Generic drug:  lurasidone Take 40 mg by mouth. NexIUM 20 mg capsule Generic drug:  esomeprazole Take 20 mg by mouth daily. NORVASC 5 mg tablet Generic drug:  amLODIPine Take 5 mg by mouth daily. ondansetron 4 mg disintegrating tablet Commonly known as:  ZOFRAN ODT Take 4 mg by mouth every eight (8) hours as needed for Nausea. Indications: ACUTE GASTROENTERITIS-RELATED VOMITING IN PEDIATRICS  
  
 oxyMORphone 5 mg ER tablet Commonly known as:  OPANA SR Take one tablet at bedtime for chronic, severe pain  
  
 polyethylene glycol 17 gram packet Commonly known as:  Vasquez Bimler Take 1 Packet by mouth daily. PROLIA 60 mg/mL injection Generic drug:  denosumab 60 mg by SubCUTAneous route. promethazine 25 mg tablet Commonly known as:  PHENERGAN Take 1 Tab by mouth every six (6) hours as needed for Nausea. senna-docusate 8.6-50 mg per tablet Commonly known as:  Linford  Take 1 Tab by mouth daily. traZODone 50 mg tablet Commonly known as:  Samule Grills Take 150 mg by mouth nightly.  Indications: INSOMNIA  
  
 triamcinolone acetonide 0.1 % ointment Commonly known as:  KENALOG Apply  to affected area. use thin layer WELLBUTRIN  mg tablet Generic drug:  buPROPion XL Take 300 mg by mouth every morning. XANAX 1 mg tablet Generic drug:  ALPRAZolam  
Take 0.5 mg by mouth as needed. Prescriptions Printed Refills HYDROcodone-acetaminophen (NORCO)  mg tablet 0 Starting on: 5/24/2018 Sig: Take 1 Tab by mouth every six (6) hours as needed for Pain for up to 30 days. Max Daily Amount: 4 Tabs. May take an additional tablet on bad days not to exceed #135 per month. Indications: Pain Class: Print Route: Oral  
  
We Performed the Following AMB POC DRUG SCREEN () [ [facility]] DRUG SCREEN [[id number] Custom] Follow-up Instructions Return in about 3 months (around 8/10/2018). Patient Instructions 1. Continue current plan. Stable on current medication without adverse events. 2. Refill  Norco 10/325 mg. Take 1 tablet up to 4 times daily as needed. May take an occasional extra tablet not to exceed 135 per month. Plan to taper down to #120 next visit 3. Continue with MiraLAX as needed 4. Please discuss alternative treatments for Xanax with PCP as soon as possible as we were no longer be able to prescribe opioid medications while you are concurrently using benzodiazepines. Naloxone 4 mg nasal spray for opioid induced respiratory depression emergency only. Discussed risks of addiction, dependency, and opioid induced hyperalgesia. Return to clinic in 3 months Introducing [facility] & HEALTH SERVICES! Dear Alexis Seals: 
Thank you for requesting a [employer] account. Our records indicate that you already have an active [employer] account. You can access your account anytime at [email or web address] Publishing [email or web address]. [email or web address] Company Did you know that you can access your hospital and ER discharge instructions at any time in [employer]? You can also review all of your test results from your hospital stay or ER visit. Additional Information If you have questions, please visit the Frequently Asked Questions section of the [employer] website at [email or web address] Remember, [employer] is NOT to be used for urgent needs. For medical emergencies, dial 911. Now available from your iPhone and Android! Please provide this summary of care documentation to your next provider. Your primary care clinician is listed as [address] If you have any questions after today's visit, please call [phone].

## 2018-05-10 NOTE — PROGRESS NOTES
Nursing Notes    Patient presents to the office today in follow-up. Patient rates her pain at 4/10 on the numerical pain scale. Reviewed medications with counts as follows:    Rx Date filled Qty Dispensed Pill Count Last Dose Short   Norco 10 mg 04/25/18 135 63 today no         Comments: Patient is here today for a follow up appt today she states her pain level today is a 4  She states she has seen her pcm since her last appt here. POC UDS was performed in office today    Any new labs or imaging since last appointment? NO    Have you been to an emergency room (ER) or urgent care clinic since your last visit? NO            Have you been hospitalized since your last visit? NO     If yes, where, when, and reason for visit? Have you seen or consulted any other health care providers outside of the [address] [place] [address]  since your last visit? YES pcm     If yes, where, when, and reason for visit? Ms. Jordan Bowie has a reminder for a \"due or due soon\" health maintenance. I have asked that she contact her primary care provider for follow-up on this health maintenance.

## 2018-05-10 NOTE — PROGRESS NOTES
HISTORY OF PRESENT ILLNESS  Jason Byrd is a 68 y.o. female. HPI She returns for followup of chronic, severe pain which is widespread and polyarticular related to rheumatoid arthritis. She is s/p left TKA. Prior medications include Oxycontin and Zohydro with no improvement. Fentanyl patch caused rash and nausea. Morphine with no help. She is s/p open reduction and internal fixation by Dr. Raúl Thorne on 2/8/2017. Ms. Margo Valladares is here today with her . She continues unchanged since last visit. She reports no new complaints today. We had a lengthy conversation about the departure of her previous providers who recently left our practice. I explained to her that moving forward we will be focusing on a more conservative and non-opioid plan of care. This will result in changes to her treatment. She reports that she is not happy about these changes and is considering transferring her care. She has not made a formal decision at this time. I will have her follow-up in 3 months. I have asked her to call and cancel her appointment and pain management agreement she does decide to transfer her care. Current medication management includes hydrocodone 10/325 mg 3-4 times daily as needed no more than 135 per month. Medications are helping with pain control and quality of life. Her pain is 3-4/10 with medication and 9/10 without. Pt describes pain as aching. Walking and house chores aggravates her pain. Her pain is improved with medication and rest. Current treatment is helping to improve general activity, mood, walking, sleep, enjoyment of life    Pt does report constipation but is currently under control with conservative treatment and MiraLAX  She  is otherwise doing well with no other complaints today. She denies any adverse events including nausea, vomiting, dizziness,  hallucinations, or seizures.      Because the patient's current regimen places him/her at increased risk for possible overdose, a prescription for naloxone nasal spray is being provided. The patient understands that this medication is only to be used in the setting of a possible overdose and that inadvertent use of this medication could precipitate overt withdrawal.     POC UDS today. Confirmation pending. Allergies   Allergen Reactions    Neurontin [Gabapentin] Other (comments)       Past Surgical History:   Procedure Laterality Date    FOOT/TOES SURGERY PROC UNLISTED      HX BREAST AUGMENTATION  1996    Saline Implants    HX CHOLECYSTECTOMY      HX GI  2012    LOW ANTERIOR RESECTION WITH COLOSTOMY AND LEFT OOPHORECTOMY    HX KNEE ARTHROSCOPY Right     HX MASTECTOMY  1996    bilateral mastectomy    HX ORTHOPAEDIC      RIGHT FOOT AND ANKLE SX    HX ORTHOPAEDIC Left 2017    wrist repair    HX OTHER SURGICAL      multiple sx right leg and foot         Review of Systems   Constitutional: Positive for malaise/fatigue. Negative for chills. HENT: Negative for congestion and sore throat. Eyes: Negative. Negative for blurred vision and double vision. Respiratory: Negative. Negative for cough. Cardiovascular: Negative. Negative for leg swelling. Gastrointestinal: Positive for constipation, heartburn and nausea. Negative for diarrhea. Genitourinary: Negative. Musculoskeletal: Positive for myalgias. Negative for falls. Skin: Negative. Neurological: Negative for dizziness, tremors, sensory change and seizures. Endo/Heme/Allergies: Negative. Negative for environmental allergies. Does not bruise/bleed easily. Psychiatric/Behavioral: Positive for depression. Negative for suicidal ideas. The patient is nervous/anxious and has insomnia. Physical Exam   Constitutional: She is oriented to person, place, and time. She appears well-developed and well-nourished. HENT:   Head: Normocephalic. Eyes: EOM are normal.   Neck: No thyromegaly present.    Pulmonary/Chest: Effort normal. No respiratory distress. Musculoskeletal: She exhibits tenderness. Antalgic gait using a walker. Neurological: She is alert and oriented to person, place, and time. She has normal reflexes. Gait abnormal.   Skin: Skin is warm and dry. Psychiatric: She has a normal mood and affect. Her behavior is normal. Judgment and thought content normal.   Nursing note and vitals reviewed. ASSESSMENT:    Encounter Diagnoses   Name Primary?  Encounter for long-term (current) use of high-risk medication Yes    Chronic pain syndrome     Primary localized osteoarthrosis of lower leg, unspecified laterality     Rheumatoid arthritis involving multiple sites with positive rheumatoid factor (HCC)     Primary osteoarthritis involving multiple joints     Pain in joint, multiple sites        COMM: [address] Program was reviewed which does not demonstrate aberrancies and/or inconsistencies with regard to the historical prescribing of controlled medications to this patient by other providers since her last visit. NOTE: She did receive oxycodone following a fall and wrist fracture filled on 2/6/2017. PLAN / Pt Instructions:  1. Continue current plan. Stable on current medication without adverse events. 2. Refill  Norco 10/325 mg. Take 1 tablet up to 4 times daily as needed. May take an occasional extra tablet not to exceed 135 per month. Plan to taper down to #120 next visit  3. Continue with MiraLAX as needed  4. Please discuss alternative treatments for Xanax with PCP as soon as possible as we were no longer be able to prescribe opioid medications while you are concurrently using benzodiazepines. Naloxone 4 mg nasal spray for opioid induced respiratory depression emergency only. Discussed risks of addiction, dependency, and opioid induced hyperalgesia.   Return to clinic in 3 months      Medications Ordered Today   Medications    HYDROcodone-acetaminophen (NORCO)  mg tablet     Sig: Take 1 Tab by mouth every six (6) hours as needed for Pain for up to 30 days. Max Daily Amount: 4 Tabs. May take an additional tablet on bad days not to exceed #135 per month. Indications: Pain     Dispense:  135 Tab     Refill:  0       Pain medications prescribed with the objective of pain relief and improved physical and psychosocial function in this patient. Spent 25 minutes with patient today reviewing the treatment plan, goals of treatment plan, and limitations of the treatment plan, to include the potential for side effects from medications and procedures. Ariella Rey 5/10/2018     Note: Please excuse any typographical errors. Voice recognition software was used for this note and may cause mistakes.

## 2018-11-30 ENCOUNTER — OFFICE VISIT (OUTPATIENT)
Dept: ORTHOPEDIC SURGERY | Facility: CLINIC | Age: 78
End: 2018-11-30

## 2018-11-30 VITALS
WEIGHT: 158 LBS | HEART RATE: 108 BPM | DIASTOLIC BLOOD PRESSURE: 77 MMHG | HEIGHT: 63 IN | SYSTOLIC BLOOD PRESSURE: 131 MMHG | TEMPERATURE: 96.5 F | OXYGEN SATURATION: 95 % | RESPIRATION RATE: 16 BRPM | BODY MASS INDEX: 28 KG/M2

## 2018-11-30 DIAGNOSIS — S92.302A CLOSED FRACTURE OF HEAD OF METATARSAL BONE OF LEFT FOOT, INITIAL ENCOUNTER: ICD-10-CM

## 2018-11-30 DIAGNOSIS — M79.672 FOOT PAIN, LEFT: Primary | ICD-10-CM

## 2018-11-30 RX ORDER — HYDROCODONE BITARTRATE AND ACETAMINOPHEN 10; 325 MG/1; MG/1
TABLET ORAL
COMMUNITY

## 2018-11-30 NOTE — PROGRESS NOTES
HISTORY OF PRESENT ILLNESS:  Cornelius Griffith is a pleasant, 66-year-old,  female, who presents to the office one day status post fall at home. She lost her balance when she bent over forward and turned her left foot outward. She immediately had tenderness to the top of the midfoot and developed an inability to fully weightbear. She has a history of multiple tendon ligament reconstructions of the left foot under the care Dr. Patricia Roche dating back about three years. She has two retained cancellous cannulated screws, which were placed in the calcaneus. The patient did use a postop shoe that belonged to her , and she is guarded with her weightbearing as she presents today. REVIEW OF SYSTEMS:  No chest pain. Exertional dyspnea. No fevers, chills, or night sweats. No rash and no itching. No nausea and no vomiting. Pain when full weightbearing is 6-7/10 and at rest with the weight off her leg and resting with her foot, the pain is a 2-3/10. PHYSICAL EXAM:  She is a healthy-appearing, well-developed, well-nourished, pleasant, 66-year-old, obese,  female, atraumatic, normocephalic, alert and oriented times three, sitting on the table comfortably. Examination of the left foot reveals moderate swelling over the dorsolateral midfoot. There is tenderness directly over the head of the third and fourth metatarsal regions. No skin breakdown is noted. She can wiggle all of her toes without any difficulty, and distal sensation is intact fully to the left lower extremity. The surgical sites associated to the posterior aspect of the calcaneus are intact and well healed. RADIOGRAPHS:  X-rays, today, three views of the left foot, reveal a minimally plantar displaced third and fourth metatarsal head fractures. There is retained orthopedic hardware in the calcaneus, as well as small clips associated cuboid and medial talonavicular complex. IMPRESSION:      1.  Minimally plantar displaced fractures of the third and fourth metatarsal heads. 2. Right foot pain. 3. Decreased range of motion of the right foot. 4. Ecchymosis on exam.     PLAN:   I am currently recommending a short fracture walker and weightbearing as tolerated associated with the left lower extremity. The patient is going to follow back in about three weeks with re-x-ray. Today, all of her and her s questions are answered to their satisfaction. A copy of her x-rays are reviewed and provided.

## 2018-12-21 ENCOUNTER — OFFICE VISIT (OUTPATIENT)
Dept: ORTHOPEDIC SURGERY | Facility: CLINIC | Age: 78
End: 2018-12-21

## 2018-12-21 VITALS
DIASTOLIC BLOOD PRESSURE: 63 MMHG | TEMPERATURE: 98.3 F | HEART RATE: 75 BPM | SYSTOLIC BLOOD PRESSURE: 131 MMHG | OXYGEN SATURATION: 92 % | WEIGHT: 158 LBS | RESPIRATION RATE: 16 BRPM | HEIGHT: 63 IN | BODY MASS INDEX: 28 KG/M2

## 2018-12-21 DIAGNOSIS — M79.671 RIGHT FOOT PAIN: Primary | ICD-10-CM

## 2018-12-21 DIAGNOSIS — S92.302A CLOSED FRACTURE OF HEAD OF METATARSAL BONE OF LEFT FOOT, INITIAL ENCOUNTER: ICD-10-CM

## 2018-12-21 DIAGNOSIS — M79.672 FOOT PAIN, LEFT: ICD-10-CM

## 2018-12-21 NOTE — PROGRESS NOTES
HISTORY OF PRESENT ILLNESS:  Rosie Sanchez is a pleasant, elderly, 55-year-old,  female, who returns to the office today following up for third and fourth metatarsal head fractures. She is doing well. She has not been in a short fracture walker over the past three weeks. She is weightbearing as tolerated over the left lower extremity. She has complex AFOs that she uses on both feet. She would like to advance to the AFO hinged for her left side. The bruising has cleared over the dorsum of her left foot, and she continues to use a four-post rolling walker for ambulation assistance. PHYSICAL EXAM:  The patient is examined today to reveal pain associated with the third and fourth metatarsal heads, which has improved since prior visitation. Distal sensation is intact fully to the left lower extremity to noxious stimuli. There is no area of ecchymosis associated over the dorsum or plantar aspect of the foot. Plantarflexion actively is 10°. Dorsiflexion is 5° against resistance. RADIOGRAPHS:  X-rays today of the left foot reveal third and fourth metatarsal head fractures with callus inlay noted. This represents and interval improvement from prior imaging. PLAN:   The patient is going to advance to her hinged AFO, and we will see her back in about three weeks for re-x-ray. If she has a return of pain to the foot once in the AFO, then she is recommended to return to using the short fracture walker. X-rays were reviewed, and all her questions were answered to her satisfaction.

## 2019-02-02 ENCOUNTER — APPOINTMENT (OUTPATIENT)
Dept: GENERAL RADIOLOGY | Age: 79
End: 2019-02-02
Attending: EMERGENCY MEDICINE
Payer: MEDICARE

## 2019-02-02 ENCOUNTER — HOSPITAL ENCOUNTER (EMERGENCY)
Age: 79
Discharge: ANOTHER HOSPITAL | End: 2019-02-03
Attending: EMERGENCY MEDICINE
Payer: MEDICARE

## 2019-02-02 ENCOUNTER — APPOINTMENT (OUTPATIENT)
Dept: CT IMAGING | Age: 79
End: 2019-02-02
Attending: EMERGENCY MEDICINE
Payer: MEDICARE

## 2019-02-02 VITALS
HEIGHT: 63 IN | OXYGEN SATURATION: 96 % | WEIGHT: 160 LBS | TEMPERATURE: 98.1 F | DIASTOLIC BLOOD PRESSURE: 82 MMHG | RESPIRATION RATE: 16 BRPM | BODY MASS INDEX: 28.35 KG/M2 | SYSTOLIC BLOOD PRESSURE: 146 MMHG | HEART RATE: 100 BPM

## 2019-02-02 DIAGNOSIS — S12.031A CLOSED NONDISPLACED FRACTURE OF POSTERIOR ARCH OF FIRST CERVICAL VERTEBRA, INITIAL ENCOUNTER (HCC): ICD-10-CM

## 2019-02-02 DIAGNOSIS — S12.100A CLOSED ODONTOID FRACTURE, INITIAL ENCOUNTER (HCC): ICD-10-CM
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
## 2019-02-02 PROCEDURE — 70450 CT HEAD/BRAIN W/O DYE: CPT

## 2019-02-02 PROCEDURE — 96372 THER/PROPH/DIAG INJ SC/IM: CPT

## 2019-02-02 PROCEDURE — 74011250636 HC RX REV CODE- 250/636: Performed by: EMERGENCY MEDICINE

## 2019-02-02 PROCEDURE — 75810000053 HC SPLINT APPLICATION

## 2019-02-02 PROCEDURE — 72125 CT NECK SPINE W/O DYE: CPT

## 2019-02-02 PROCEDURE — L0120 CERV FLEX N/ADJ FOAM PRE OTS: HCPCS

## 2019-02-02 PROCEDURE — 96374 THER/PROPH/DIAG INJ IV PUSH: CPT

## 2019-02-02 PROCEDURE — 99282 EMERGENCY DEPT VISIT SF MDM: CPT

## 2019-02-02 PROCEDURE — 71250 CT THORAX DX C-: CPT

## 2019-02-02 PROCEDURE — 99283 EMERGENCY DEPT VISIT LOW MDM: CPT

## 2019-02-02 PROCEDURE — 73100 X-RAY EXAM OF WRIST: CPT

## 2019-02-02 PROCEDURE — 74011250637 HC RX REV CODE- 250/637: Performed by: EMERGENCY MEDICINE

## 2019-02-02 RX ORDER — HYDROCODONE BITARTRATE AND ACETAMINOPHEN 5; 325 MG/1; MG/1
1 TABLET ORAL
Status: COMPLETED | OUTPATIENT
Start: 2019-02-02 | End: 2019-02-02

## 2019-02-02 RX ORDER — MORPHINE SULFATE 4 MG/ML
4 INJECTION INTRAVENOUS
Status: COMPLETED | OUTPATIENT
Start: 2019-02-02 | End: 2019-02-02

## 2019-02-02 RX ORDER — MORPHINE SULFATE 2 MG/ML
2 INJECTION, SOLUTION INTRAMUSCULAR; INTRAVENOUS
Status: COMPLETED | OUTPATIENT
Start: 2019-02-02 | End: 2019-02-02

## 2019-02-02 RX ADMIN — MORPHINE SULFATE 4 MG: 4 INJECTION INTRAVENOUS at 16:12

## 2019-02-02 RX ADMIN — MORPHINE SULFATE 2 MG: 2 INJECTION, SOLUTION INTRAMUSCULAR; INTRAVENOUS at 13:39

## 2019-02-02 RX ADMIN — HYDROCODONE BITARTRATE AND ACETAMINOPHEN 1 TABLET: 5; 325 TABLET ORAL at 12:50

## 2019-02-02 NOTE — ED NOTES
Pt rec'd to bed 9 with c-collar in place, resp even non labored, abraison to bilat knees, pt complains of pain to neck and right breast.

## 2019-02-02 NOTE — ED NOTES
Multiple attempts made to call report to [facility] ED. All reports and films sent with patient.

## 2019-02-02 NOTE — ED PROVIDER NOTES
EMERGENCY DEPARTMENT HISTORY AND PHYSICAL EXAM 
 
12:55 PM 
 
 
Date: 2/2/2019 Patient Name: Stan Cartwright History of Presenting Illness Chief Complaint Patient presents with  Fall History Provided By: Patient Chief Complaint: Generalized pain Duration:  PTA Timing:  Acute Location: Generalized Quality: N/A Severity: Moderate Modifying Factors: No modifying or aggravating factors were reported. Associated Symptoms: R ankle, R wrist, and head pain, neck pain, back pain, breast swelling Additional History (Context): Stan Cartwright is a 66 y.o. female with a PMHx of HTN, balance disorder, arthritis, and breast cancer who presents with acute, moderate, generalized pain onset PTA due to a mechanical fall. Patient reports she was on her way to the hospital to get checked out for her acute, hard R breast swelling onset a few days ago. Patient had a double mastectomy, so Dr. Nimo Mao installed saline implants about 20 years ago. At one point, she fell and popped one of the saline, needing to get it replaced. She denies recent trauma or pain in the breast. During her most recent fall, she fell forward, catching herself with her R hand and hurting her wrist. She also has moderate neck and back pain. She has an abrasion on her R knee and top of her head. She denies fever, vomiting, and LOC. She does not take blood thinners. No modifying or aggravating factors were reported. No other symptoms or concerns were expressed. PCP: Alissa Foster MD 
 
Current Outpatient Medications Medication Sig Dispense Refill  
 HYDROcodone-acetaminophen (NORCO)  mg tablet hydrocodone 10 mg-acetaminophen 325 mg tablet  amLODIPine (NORVASC) 5 mg tablet Take 5 mg by mouth daily.  esomeprazole (NEXIUM) 20 mg capsule Take 20 mg by mouth daily.     
 oxyMORphone 5 mg PO ER tablet Take one tablet at bedtime for chronic, severe pain 30 Tab 0  
  senna-docusate (PERICOLACE) 8.6-50 mg per tablet Take 1 Tab by mouth daily. 60 Tab 0  
 triamcinolone acetonide (KENALOG) 0.1 % ointment Apply  to affected area. use thin layer  promethazine (PHENERGAN) 25 mg tablet Take 1 Tab by mouth every six (6) hours as needed for Nausea. 30 Tab 0  
 polyethylene glycol (MIRALAX) 17 gram packet Take 1 Packet by mouth daily. 10 Packet 1  
 Calcium-Cholecalciferol, D3, 600 mg(1,500mg) -400 unit cap Take  by mouth.  atorvastatin (LIPITOR) 10 mg tablet Take  by mouth daily.  lurasidone (LATUDA) 20 mg tab tablet Take 40 mg by mouth.  Denosumab (PROLIA) 60 mg/mL injection 60 mg by SubCUTAneous route.  ondansetron (ZOFRAN ODT) 4 mg disintegrating tablet Take 4 mg by mouth every eight (8) hours as needed for Nausea. Indications: ACUTE GASTROENTERITIS-RELATED VOMITING IN PEDIATRICS    
 buPROPion XL (WELLBUTRIN XL) 150 mg tablet Take 300 mg by mouth every morning.  diltiazem hcl (CARDIZEM) 120 mg tablet Take 120 mg by mouth daily.  ALPRAZolam (XANAX) 1 mg tablet Take 0.5 mg by mouth as needed.  traZODone (DESYREL) 50 mg tablet Take 150 mg by mouth nightly. Indications: INSOMNIA Past History Past Medical History: 
Past Medical History:  
Diagnosis Date  Arrhythmia   
 hx PVCs  Arthritis RHEUMATOID  and Osteo arthritis  Balance disorder 12/2016  Cancer ([address] BREAST, broderick-broderick mastectomies  Constipation  Depression  Diverticular disease  Fibromyalgia  GERD (gastroesophageal reflux disease)  Headache(784.0)  Hearing reduced   
 hearing loss,ringing in ears  Hypertension  Insomnia  Neurological disorder   
 headaches,difficulty walking,poor memory  Osteoporosis  Other ill-defined conditions(799.89) absessed perforated diverticulum  Other ill-defined conditions(799.89) osteoporosis  Psychiatric disorder  Sleep apnea CPAP  Vision decreased poor vision,  
 
 
Past Surgical History: 
Past Surgical History:  
Procedure Laterality Date  FOOT/TOES SURGERY PROC UNLISTED [address] Saline Implants  HX CHOLECYSTECTOMY  HX GI  2012 LOW ANTERIOR RESECTION WITH COLOSTOMY AND LEFT OOPHORECTOMY  HX KNEE ARTHROSCOPY Right  HX MASTECTOMY  1996  
 bilateral mastectomy  HX ORTHOPAEDIC    
 RIGHT FOOT AND ANKLE SX  
 HX ORTHOPAEDIC Left 2017  
 wrist repair  HX OTHER SURGICAL    
 multiple sx right leg and foot Family History: 
Family History Problem Relation Age of Onset  Hypertension Mother  Headache Mother  Diabetes Father  Hypertension Father  Heart Attack Father Social History: 
Social History Tobacco Use  Smoking status: Former Smoker Last attempt to quit: 2006 Years since quittin.1  Smokeless tobacco: Never Used Substance Use Topics  Alcohol use: No  
 Drug use: No  
 
 
Allergies: Allergies Allergen Reactions Mechele  [Lurasidone] Other (comments) Memory loss  Neurontin [Gabapentin] Other (comments) Review of Systems Review of Systems Constitutional: Negative for chills and fever. HENT: Negative for congestion, ear pain, sinus pain and sore throat. Respiratory: Negative for cough and shortness of breath. Cardiovascular: Negative for chest pain and leg swelling. Gastrointestinal: Negative for abdominal pain, constipation, diarrhea, nausea and vomiting. Genitourinary: Negative for dysuria, hematuria, vaginal bleeding and vaginal discharge. Musculoskeletal: Positive for arthralgias (R wrist, R knee), back pain and neck pain. Skin: Positive for wound (abrasion to R knee, top of head). Positive for R breast swelling. Neurological: Positive for headaches. Negative for syncope. All other systems reviewed and are negative. Physical Exam  
 
Visit Vitals /82 (BP 1 Location: Left arm, BP Patient Position: At rest) Pulse 100 Temp 98.1 °F (36.7 °C) Resp 16 Ht 5' 3\" (1.6 m) Wt 72.6 kg (160 lb) SpO2 96% BMI 28.34 kg/m² Physical Exam 
GENERAL: Elderly  female. NAD, AOx4, awake EYES: PERRLA, EOMI, no conjunctival pallor, no hyphema HEAD: NC. Superficial abrasion to R forehead with no open laceration. Bleeding well-controlled. NECK: Midline tenderness, no step offs or misalignments, trachea midline. Immobilized in C-collar. ENT: MMM, no pharyngeal edema, hearing intact CV: RRR, +S1/S2, no JVD RESP: Lungs CTA B/L,  no w/r/r, breaths sounds equal and nonlabored, no accessory muscle use CHEST: No crepitus, no ecchymosis or abrasions. Asymmetry to R breast as compared with L, moderately enlarged with no surrounding erythema or fluctuance. Firm to palpation but with no tenderness. ABD:  normoactive BS x4 quadrants, non-TTP, soft and non distended EXTREMITIES: no LE edema B/L. R wrist tender with palpation, but with no obvious deformities or crepitus. Neurovascularly intact. INTEGUMENTARY: warm, dry, no pallor MSK: normal ROM, normal gross strength NEURO: Alert and appropriate, normal speech,  CNs II-XII intact, moving all 4 extremities freely and symmetrically Diagnostic Study Results Labs - No results found for this or any previous visit (from the past 12 hour(s)). Radiologic Studies -  
CT CHEST WO CONT Final Result IMPRESSION:  
  
1. Intact breast implants. 2. No acute cardiopulmonary process. CT SPINE CERV WO CONT Final Result IMPRESSION:  
  
1. Dorsally angulated and displaced type II odontoid fracture. 2. Fractures involving the anterior and posterior arch of C1 that do not appear  
to involve the transverse foramen. 3. Limited assessment of the cord without clear abnormality or evidence of  
adjacent hematoma. 4. Degenerative changes as above. Findings were relayed to Dr. Kimberly Vo at 1500 hours. CT HEAD WO CONT Final Result IMPRESSION:  
  
1. No acute intracranial pathology. 2. Frontal scalp hematoma. XR WRIST RT AP/LAT Final Result IMPRESSION:   
  
1. Minimally displaced fracture of the distal ulna. Medical Decision Making It should be noted that Shanthi LONDON Cather* will be the provider of record for this patient. I reviewed the vital signs, available nursing notes, past medical history, past surgical history, family history and social history. Vital Signs-Reviewed the patient's vital signs. Pulse Oximetry Analysis -  96% on room air, WNL Cardiac Monitor: 
Rate: 100 bpm 
 
Records Reviewed: Nursing Notes (Time of Review: 12:55 PM) ED Course: Progress Notes, Reevaluation, and Consults: 
 
3:01 PM Radiology: 
Patient has fractures in neck but no intercranial hemorrhaging. According to radiologist, requires transfer to [facility]. 
 
Consult:  Discussed care with Dr. Randal Olivia [facility] ED Physician). Standard discussion; including history of patients chief complaint, available diagnostic results, and treatment course. Accepts admission. 3:40 PM, 2/2/2019 Provider Notes (Medical Decision Making):  
65 YO F who presents after a fall on her way to the ED for evaluation of a swollen and enlarged R sided saline breast implant placed s/p cancer 20 years prior, c/o R wrist, neck and head pain, not anticoagulated. On exam R sided breast firmness with no fluctuance, no erhythema no concern for infection, suspicion for leaking saline implant. Immobilized in c collar w/ midline tenderness, will obtain C spine. R wrist tenderness, will XR. R forehead abrasion, will obtain CT head. CT head no ICH. C spine w/ C1 posterior arch and odontoid displaced fx. R wrist splinted. Will require tx to [facility]. For Trauma/NSGY.   
 
Critical Care Time: 
 The services I provided to this patient were to treat and/or prevent clinically significant deterioration that could result in the failure of one or more body systems and/or organ systems due to trauma. Services included the following: 
-reviewing nursing notes and old charts 
-vital sign assessments 
-direct patient care 
-medication orders and management 
-interpreting and reviewing diagnostic studies/labs 
-re-evaluations 
-documentation time Aggregate critical care time was 35 minutes, which includes only time during which I was engaged in work directly related to the patient's care as described above, whether I was at bedside or elsewhere in the Emergency Department. It did not include time spent performing other reported procedures or the services of residents, students, nurses, or advance practice providers. Dorothy Maki* 
 
3:56 PM 
 
 
Diagnosis Clinical Impression: 1. Fall, initial encounter 2. Closed nondisplaced fracture of posterior arch of first cervical vertebra, initial encounter ([facility] [address] 3. Closed odontoid fracture, [facility] Disposition: Transferred to [employer] [facility] [employer] None Medication List  
  
ASK your doctor about these medications   
atorvastatin 10 mg tablet Commonly known as:  LIPITOR Calcium-Cholecalciferol (D3) 600 mg(1,500mg) -400 unit Cap CARDIZEM 120 mg tablet Generic drug:  dilTIAZem HYDROcodone-acetaminophen  mg tablet Commonly known as:  NORCO 
  
LATUDA 20 mg Tab tablet Generic drug:  lurasidone NexIUM 20 mg capsule Generic drug:  esomeprazole NORVASC 5 mg tablet Generic drug:  amLODIPine 
  
ondansetron 4 mg disintegrating tablet Commonly known as:  ZOFRAN ODT 
  
oxyMORphone 5 mg ER tablet Commonly known as:  OPANA SR Take one tablet at bedtime for chronic, severe pain 
  
polyethylene glycol 17 gram packet Commonly known as:  Laura [facility] Take 1 Packet by mouth daily. PROLIA 60 mg/mL injection Generic drug:  denosumab 
  
promethazine 25 mg tablet Commonly known as:  PHENERGAN Take 1 Tab by mouth every six (6) hours as needed for Nausea. senna-docusate 8.6-50 mg per tablet Commonly known as:  Rob Blander Take 1 Tab by mouth daily. traZODone 50 mg tablet Commonly known as:  DESYREL 
  
triamcinolone acetonide 0.1 % ointment Commonly known as:  KENALOG 
  
WELLBUTRIN  mg tablet Generic drug:  buPROPion XL 
  
XANAX 1 mg tablet Generic drug:  ALPRAZolam 
  
  
 
_______________________________ Scribe Attestation Nancy Bravo acting as a scribe for and in the presence of Dorothy Maki* February 02, 2019 at 12:55 PM 
    
Provider Attestation:     
I personally performed the services described in the documentation, reviewed the documentation, as recorded by the scribe in my presence, and it accurately and completely records my words and actions. February 02, 2019 at 12:55 PM - Dorothy Maki* 
_______________________________

## 2019-02-02 NOTE — ED TRIAGE NOTES
\"I was trying to get in the car to come here to get my right breast checked. I had breat implants and over the past couple of days, it's been hard. I was reaching for something to hold on to and lost my balance and fell. My right wrist, the back of my neck, and head hurt. \"  Denies LOC. No deformity noted to right wrist.  Abrasion noted to right knee. C-collar placed.

## 2019-03-05 ENCOUNTER — OFFICE VISIT (OUTPATIENT)
Dept: ORTHOPEDIC SURGERY | Facility: CLINIC | Age: 79
End: 2019-03-05

## 2019-03-05 VITALS
HEART RATE: 92 BPM | TEMPERATURE: 97.5 F | HEIGHT: 63 IN | RESPIRATION RATE: 18 BRPM | SYSTOLIC BLOOD PRESSURE: 121 MMHG | BODY MASS INDEX: 28.34 KG/M2 | OXYGEN SATURATION: 98 % | DIASTOLIC BLOOD PRESSURE: 55 MMHG

## 2019-03-05 DIAGNOSIS — S52.621A CLOSED TORUS FRACTURE OF DISTAL END OF RIGHT ULNA, INITIAL ENCOUNTER: Primary | ICD-10-CM

## 2019-03-05 NOTE — PROGRESS NOTES
Yordy Watson is a 66 y.o. female right handed retiree. Worker's Compensation and legal considerations: not known. Vitals:    03/05/19 0946   BP: 121/55   Pulse: 92   Resp: 18   Temp: 97.5 °F (36.4 °C)   TempSrc: Oral   SpO2: 98%   Height: 5' 3\" (1.6 m)   PainSc:   1   PainLoc: Wrist           Chief Complaint   Patient presents with    Wrist Pain     Right         HPI: Patient comes in today with a history of a fall. This was 4 weeks ago at which point she was placed into a cast.  She has been in a short arm cast for 4 weeks and she reports some pain and rubbing at the mid hand area. She denies any new falls or injuries. She reports the pain in her wrist is improved.     Date of onset: 2/2/2019    Injury: Yes: Comment: Fall    Prior Treatment:  Yes: Comment: Cast to right arm    Numbness/ Tingling: No    ROS: Review of Systems - General ROS: negative  Respiratory ROS: no cough, shortness of breath, or wheezing  Cardiovascular ROS: no chest pain or dyspnea on exertion  Musculoskeletal ROS: positive for - pain in hand - right  Neurological ROS: negative  Dermatological ROS: negative    Past Medical History:   Diagnosis Date    Arrhythmia     hx PVCs    Arthritis     RHEUMATOID  and Osteo arthritis    Balance disorder 12/2016    Cancer ([facility] [address]     BREAST, broderick-broderick mastectomies    Constipation     Depression     Diverticular disease     Fibromyalgia     GERD (gastroesophageal reflux disease)     Headache(784.0)     Hearing reduced     hearing loss,ringing in ears    Hypertension     Insomnia     Neurological disorder     headaches,difficulty walking,poor memory    Osteoporosis     Other ill-defined conditions(799.89)     absessed perforated diverticulum    Other ill-defined conditions(799.89)     osteoporosis    Psychiatric disorder     Sleep apnea     CPAP    Vision decreased     poor vision,       Past Surgical History:   Procedure Laterality Date    FOOT/TOES SURGERY PROC UNLISTED      HX BREAST AUGMENTATION  1996    Saline Implants    HX CHOLECYSTECTOMY      HX GI  2012    LOW ANTERIOR RESECTION WITH COLOSTOMY AND LEFT OOPHORECTOMY    HX KNEE ARTHROSCOPY Right     HX MASTECTOMY  1996    bilateral mastectomy    HX ORTHOPAEDIC      RIGHT FOOT AND ANKLE SX    HX ORTHOPAEDIC Left 2017    wrist repair    HX OTHER SURGICAL      multiple sx right leg and foot       Current Outpatient Medications   Medication Sig Dispense Refill    HYDROcodone-acetaminophen (NORCO)  mg tablet hydrocodone 10 mg-acetaminophen 325 mg tablet      amLODIPine (NORVASC) 5 mg tablet Take 5 mg by mouth daily.  esomeprazole (NEXIUM) 20 mg capsule Take 20 mg by mouth daily.  oxyMORphone 5 mg PO ER tablet Take one tablet at bedtime for chronic, severe pain 30 Tab 0    senna-docusate (PERICOLACE) 8.6-50 mg per tablet Take 1 Tab by mouth daily. 60 Tab 0    promethazine (PHENERGAN) 25 mg tablet Take 1 Tab by mouth every six (6) hours as needed for Nausea. 30 Tab 0    polyethylene glycol (MIRALAX) 17 gram packet Take 1 Packet by mouth daily. 10 Packet 1    Calcium-Cholecalciferol, D3, 600 mg(1,500mg) -400 unit cap Take  by mouth.  atorvastatin (LIPITOR) 10 mg tablet Take  by mouth daily.  Denosumab (PROLIA) 60 mg/mL injection 60 mg by SubCUTAneous route.  ondansetron (ZOFRAN ODT) 4 mg disintegrating tablet Take 4 mg by mouth every eight (8) hours as needed for Nausea. Indications: ACUTE GASTROENTERITIS-RELATED VOMITING IN PEDIATRICS      buPROPion XL (WELLBUTRIN XL) 150 mg tablet Take 300 mg by mouth every morning.  diltiazem hcl (CARDIZEM) 120 mg tablet Take 120 mg by mouth daily.  traZODone (DESYREL) 50 mg tablet Take 150 mg by mouth nightly. Indications: INSOMNIA      triamcinolone acetonide (KENALOG) 0.1 % ointment Apply  to affected area. use thin layer      lurasidone (LATUDA) 20 mg tab tablet Take 40 mg by mouth.       ALPRAZolam (XANAX) 1 mg tablet Take 0.5 mg by mouth as needed. Allergies   Allergen Reactions    Latuda [Lurasidone] Other (comments)     Memory loss    Neurontin [Gabapentin] Other (comments)         PE:     Right Upper Extremity: Cast is clean dry intact without any evidence of breakdown. It was removed today and there is one small area of cast sore at the second MCP joint. This is not draining or any evidence of infection. This has scabbed over. There is no tenderness to palpation about the right ulna or wrist in general.  Sensation is grossly intact. Cap refill is brisk. Imaging: Plain films reviewed from the emergency department show a right distal ulna fracture. Plain films reviewed today of the right wrist show interval callus formation of the right distal ulna fracture.         ICD-10-CM ICD-9-CM    1. Closed torus fracture of distal end of right ulna, initial encounter S52.621A 813.46 AMB POC XRAY, WRIST; COMPLETE, 3+ VIE      AMB SUPPLY ORDER       Plan:    Removable wrist splint to be worn at all times except for hygiene    Follow-up in 2 weeks for reevaluation and x-rays of right wrist    Nonweightbearing to right upper extremity    Plan was reviewed with patient, who verbalized agreement and understanding of the plan

## 2019-04-03 ENCOUNTER — OFFICE VISIT (OUTPATIENT)
Dept: ORTHOPEDIC SURGERY | Facility: CLINIC | Age: 79
End: 2019-04-03

## 2019-04-03 VITALS
HEART RATE: 96 BPM | OXYGEN SATURATION: 98 % | DIASTOLIC BLOOD PRESSURE: 70 MMHG | SYSTOLIC BLOOD PRESSURE: 126 MMHG | HEIGHT: 63 IN | RESPIRATION RATE: 18 BRPM | BODY MASS INDEX: 28.34 KG/M2 | TEMPERATURE: 98.1 F

## 2019-04-03 DIAGNOSIS — S52.621A CLOSED TORUS FRACTURE OF DISTAL END OF RIGHT ULNA, INITIAL ENCOUNTER: Primary | ICD-10-CM

## 2019-04-03 NOTE — PATIENT INSTRUCTIONS
Wrist Fracture: Rehab Exercises Your Care Instructions Here are some examples of typical rehabilitation exercises for your condition. Start each exercise slowly. Ease off the exercise if you start to have pain. Your doctor or your physical or occupational therapist will tell you when you can start these exercises and which ones will work best for you. How to do the exercises Wrist flexion and extension 1. Place your forearm on a table, with your hand and affected wrist extended beyond the table, palm down. 2. Bend your wrist to move your hand upward and allow your hand to close into a fist, then lower your hand and allow your fingers to relax. Hold each position for about 6 seconds. 3. Repeat 8 to 12 times. Hand flips 1. While seated, place your forearm and affected wrist on your thigh, palm down. 2. Flip your hand over so the back of your hand rests on your thigh and your palm is up. Alternate between palm up and palm down while keeping your forearm on your thigh. 3. Repeat 8 to 12 times. Wrist radial and ulnar deviation 1. Hold your affected hand out in front of you, palm down. 2. Slowly bend your wrist as far as you can from side to side. Hold each position for about 6 seconds. 3. Repeat 8 to 12 times. Wrist extensor stretch 1. Extend the arm with the affected wrist in front of you and point your fingers toward the floor. 2. With your other hand, gently bend your wrist farther until you feel a mild to moderate stretch in your forearm. 3. Hold the stretch for at least 15 to 30 seconds. 4. Repeat 2 to 4 times. 5. When you can do this stretch with ease and no pain, repeat steps 1 through 4. But this time extend your affected arm in front of you and make a fist with your palm facing down. Then bend your wrist, pointing your fist toward the floor. Wrist flexor stretch 1. Extend the arm with the affected wrist in front of you with your palm facing away from your body. 2. Bend back your wrist, pointing your hand up toward the ceiling. 3. With your other hand, gently bend your wrist farther until you feel a mild to moderate stretch in your forearm. 4. Hold the stretch for at least 15 to 30 seconds. 5. Repeat 2 to 4 times. 6. Repeat steps 1 through 5, but this time extend your affected arm in front of you with your palm facing up. Then bend back your wrist, pointing your hand toward the floor. Intrinsic flexion 1. Rest the hand with the affected wrist on a table and bend the large joints where your fingers connect to your hand. Keep your thumb and the other joints in your fingers straight. 2. Slowly straighten your fingers. Your wrist should be relaxed, following the line of your fingers and thumb. 3. Move back to your starting position, with your hand bent. 4. Repeat 8 to 12 times. MP extension 1. Place your good hand on a table, palm up. Put the hand with the affected wrist on top of your good hand with your fingers wrapped around the thumb of your good hand like you are making a fist. 
2. Slowly uncurl the joints of the hand with the affected wrist where your fingers connect to your hand so that only the top two joints of your fingers are bent. Your fingers will look like a hook. Hold the position for about 6 seconds. 3. Move back to your starting position, with your fingers wrapped around your good thumb. 4. Repeat 8 to 12 times. Follow-up care is a key part of your treatment and safety. Be sure to make and go to all appointments, and call your doctor if you are having problems. It's also a good idea to know your test results and keep a list of the medicines you take. Where can you learn more? Go to http://ammy-landon.info/. Enter [id number] in the search box to learn more about \"Wrist Fracture: Rehab Exercises. \" Current as of: September 20, 2018 Content Version: 11.9 © 3623-5184 [employer]. Care instructions adapted under license by [employer] (which disclaims liability or warranty for this information). If you have questions about a medical condition or this instruction, always ask your healthcare professional. [address] any warranty or liability for your use of this information.

## 2019-04-03 NOTE — PROGRESS NOTES
Alan Patel is a 66 y.o. female right handed retiree. Worker's Compensation and legal considerations: not known. Vitals:  
 04/03/19 1454 BP: 126/70 Pulse: 96  
Resp: 18 Temp: 98.1 °F (36.7 °C) TempSrc: Oral  
SpO2: 98% Height: 5' 3\" (1.6 m) PainSc:   0 - No pain PainLoc: Wrist  
 
 
 
 
Chief Complaint Patient presents with  Wrist Pain Right HPI: Patient comes in today 6 weeks status post fall and fracture to her right distal ulna. 2 weeks ago she was taken out of her cast that she had been in for 4 weeks and placed into a removable wrist splint. She has been wearing this all the time. She reports no pain today and only has pain when she twists her wrist a certain way. She denies any new injuries or falls. Previous HPI: Patient comes in today with a history of a fall. This was 4 weeks ago at which point she was placed into a cast.  She has been in a short arm cast for 4 weeks and she reports some pain and rubbing at the mid hand area. She denies any new falls or injuries. She reports the pain in her wrist is improved. Date of onset: 2/2/2019 Injury: Yes: Comment: Fall Prior Treatment:  Yes: Comment: Cast to right arm Numbness/ Tingling: No 
 
ROS: Review of Systems - General ROS: negative Respiratory ROS: no cough, shortness of breath, or wheezing Cardiovascular ROS: no chest pain or dyspnea on exertion Musculoskeletal ROS: positive for - pain in hand - right Neurological ROS: negative Dermatological ROS: negative Past Medical History:  
Diagnosis Date  Arrhythmia   
 hx PVCs  Arthritis RHEUMATOID  and Osteo arthritis  Balance disorder 12/2016  Cancer ([facility] [address] BREAST, broderick-broderick mastectomies  Constipation  Depression  Diverticular disease  Fibromyalgia  GERD (gastroesophageal reflux disease)  Headache(784.0)  Hearing reduced   
 hearing loss,ringing in ears  Hypertension  Insomnia  Neurological disorder   
 headaches,difficulty walking,poor memory  Osteoporosis  Other ill-defined conditions(799.89) absessed perforated diverticulum  Other ill-defined conditions(799.89) osteoporosis  Psychiatric disorder  Sleep apnea CPAP  Vision decreased   
 poor vision,  
 
 
Past Surgical History:  
Procedure Laterality Date  FOOT/TOES SURGERY PROC UNLISTED [address] Saline Implants  HX CHOLECYSTECTOMY  HX GI  2012 LOW ANTERIOR RESECTION WITH COLOSTOMY AND LEFT OOPHORECTOMY  HX KNEE ARTHROSCOPY Right  HX MASTECTOMY  1996  
 bilateral mastectomy  HX ORTHOPAEDIC    
 RIGHT FOOT AND ANKLE SX  
 HX ORTHOPAEDIC Left 2017  
 wrist repair  HX OTHER SURGICAL    
 multiple sx right leg and foot Current Outpatient Medications Medication Sig Dispense Refill  esomeprazole (NEXIUM) 20 mg capsule Take 20 mg by mouth daily.  oxyMORphone 5 mg PO ER tablet Take one tablet at bedtime for chronic, severe pain 30 Tab 0  
 senna-docusate (PERICOLACE) 8.6-50 mg per tablet Take 1 Tab by mouth daily. 60 Tab 0  
 polyethylene glycol (MIRALAX) 17 gram packet Take 1 Packet by mouth daily. 10 Packet 1  
 Calcium-Cholecalciferol, D3, 600 mg(1,500mg) -400 unit cap Take  by mouth.  atorvastatin (LIPITOR) 10 mg tablet Take  by mouth daily.  Denosumab (PROLIA) 60 mg/mL injection 60 mg by SubCUTAneous route.  ondansetron (ZOFRAN ODT) 4 mg disintegrating tablet Take 4 mg by mouth every eight (8) hours as needed for Nausea. Indications: ACUTE GASTROENTERITIS-RELATED VOMITING IN PEDIATRICS    
 buPROPion XL (WELLBUTRIN XL) 150 mg tablet Take 300 mg by mouth every morning.  traZODone (DESYREL) 50 mg tablet Take 150 mg by mouth nightly. Indications: INSOMNIA    
 HYDROcodone-acetaminophen (NORCO)  mg tablet hydrocodone 10 mg-acetaminophen 325 mg tablet  amLODIPine (NORVASC) 5 mg tablet Take 5 mg by mouth daily.  triamcinolone acetonide (KENALOG) 0.1 % ointment Apply  to affected area. use thin layer  promethazine (PHENERGAN) 25 mg tablet Take 1 Tab by mouth every six (6) hours as needed for Nausea. 30 Tab 0  
 lurasidone (LATUDA) 20 mg tab tablet Take 40 mg by mouth.  diltiazem hcl (CARDIZEM) 120 mg tablet Take 120 mg by mouth daily.  ALPRAZolam (XANAX) 1 mg tablet Take 0.5 mg by mouth as needed. Allergies Allergen Reactions Nelly Bibles [Lurasidone] Other (comments) Memory loss  Neurontin [Gabapentin] Other (comments) PE:  
 
Right Upper Extremity: Brace was removed today and there is no edema or tenderness to palpation noted about the wrist.  The patient has good range of motion about the wrist. 
 
Imaging:  
 
3 views of the right wrist today: There is increased bony resorption compared to 2 weeks ago however there is significant callus formation about the fracture site. ICD-10-CM ICD-9-CM 1. Closed torus fracture of distal end of right ulna, initial encounter S52.621A 813.46 AMB POC XRAY, WRIST; COMPLETE, 3+ VIE Plan: She will continue wear the removable wrist splint whenever she is out of the house for 6 more weeks but she can take it off whenever she is at home. I have also told her to work on flexion extension exercise in the wrist but will avoid supination and pronation. Follow-up in 6 weeks for reevaluation and x-rays. Plan was reviewed with patient, who verbalized agreement and understanding of the plan

## 2019-05-15 ENCOUNTER — OFFICE VISIT (OUTPATIENT)
Dept: ORTHOPEDIC SURGERY | Facility: CLINIC | Age: 79
End: 2019-05-15

## 2019-05-15 VITALS
HEART RATE: 85 BPM | BODY MASS INDEX: 27.93 KG/M2 | RESPIRATION RATE: 18 BRPM | SYSTOLIC BLOOD PRESSURE: 139 MMHG | TEMPERATURE: 96.5 F | OXYGEN SATURATION: 97 % | HEIGHT: 63 IN | DIASTOLIC BLOOD PRESSURE: 65 MMHG | WEIGHT: 157.6 LBS

## 2019-05-15 DIAGNOSIS — S52.621G: ICD-10-CM

## 2019-05-15 DIAGNOSIS — S52.621K: Primary | ICD-10-CM

## 2019-05-15 NOTE — PROGRESS NOTES
Chintan Ewing is a 66 y.o. female right handed retiree. Worker's Compensation and legal considerations: not known. Vitals:  
 05/15/19 1508 BP: 139/65 Pulse: 85 Resp: 18 Temp: 96.5 °F (35.8 °C) TempSrc: Oral  
SpO2: 97% Weight: 157 lb 9.6 oz (71.5 kg) Height: 5' 3\" (1.6 m) PainSc:   0 - No pain PainLoc: Wrist  
 
 
 
 
Chief Complaint Patient presents with  Wrist Pain Right HPI:   
 
Previous HPI: Patient comes in today 6 weeks status post fall and fracture to her right distal ulna. 2 weeks ago she was taken out of her cast that she had been in for 4 weeks and placed into a removable wrist splint. She has been wearing this all the time. She reports no pain today and only has pain when she twists her wrist a certain way. She denies any new injuries or falls. Initial HPI: Patient comes in today with a history of a fall. This was 4 weeks ago at which point she was placed into a cast.  She has been in a short arm cast for 4 weeks and she reports some pain and rubbing at the mid hand area. She denies any new falls or injuries. She reports the pain in her wrist is improved. Date of onset: 2/2/2019 Injury: Yes: Comment: Fall Prior Treatment:  Yes: Comment: Cast to right arm Numbness/ Tingling: No 
 
ROS: Review of Systems - General ROS: negative Respiratory ROS: no cough, shortness of breath, or wheezing Cardiovascular ROS: no chest pain or dyspnea on exertion Musculoskeletal ROS: positive for - pain in hand - right Neurological ROS: negative Dermatological ROS: negative Past Medical History:  
Diagnosis Date  Arrhythmia   
 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   
 headaches,difficulty walking,poor memory  Osteoporosis  Other ill-defined conditions(799.89) absessed perforated diverticulum  Other ill-defined conditions(799.89) osteoporosis  Psychiatric disorder  Sleep apnea CPAP  Vision decreased   
 poor vision,  
 
 
Past Surgical History:  
Procedure Laterality Date  FOOT/TOES SURGERY PROC UNLISTED [address] Saline Implants  HX CHOLECYSTECTOMY  HX GI  2012 LOW ANTERIOR RESECTION WITH COLOSTOMY AND LEFT OOPHORECTOMY  HX KNEE ARTHROSCOPY Right  HX MASTECTOMY  1996  
 bilateral mastectomy  HX ORTHOPAEDIC    
 RIGHT FOOT AND ANKLE SX  
 HX ORTHOPAEDIC Left 2017  
 wrist repair  HX OTHER SURGICAL    
 multiple sx right leg and foot Current Outpatient Medications Medication Sig Dispense Refill  
 HYDROcodone-acetaminophen (NORCO)  mg tablet hydrocodone 10 mg-acetaminophen 325 mg tablet  esomeprazole (NEXIUM) 20 mg capsule Take 20 mg by mouth daily.  polyethylene glycol (MIRALAX) 17 gram packet Take 1 Packet by mouth daily. 10 Packet 1  
 Calcium-Cholecalciferol, D3, 600 mg(1,500mg) -400 unit cap Take  by mouth.  atorvastatin (LIPITOR) 10 mg tablet Take  by mouth daily.  ondansetron (ZOFRAN ODT) 4 mg disintegrating tablet Take 4 mg by mouth every eight (8) hours as needed for Nausea. Indications: ACUTE GASTROENTERITIS-RELATED VOMITING IN PEDIATRICS    
 buPROPion XL (WELLBUTRIN XL) 150 mg tablet Take 300 mg by mouth every morning.  traZODone (DESYREL) 50 mg tablet Take 150 mg by mouth nightly. Indications: INSOMNIA  amLODIPine (NORVASC) 5 mg tablet Take 5 mg by mouth daily.  oxyMORphone 5 mg PO ER tablet Take one tablet at bedtime for chronic, severe pain 30 Tab 0  
 senna-docusate (PERICOLACE) 8.6-50 mg per tablet Take 1 Tab by mouth daily.  60 Tab 0  
  triamcinolone acetonide (KENALOG) 0.1 % ointment Apply  to affected area. use thin layer  promethazine (PHENERGAN) 25 mg tablet Take 1 Tab by mouth every six (6) hours as needed for Nausea. 30 Tab 0  
 lurasidone (LATUDA) 20 mg tab tablet Take 40 mg by mouth.  Denosumab (PROLIA) 60 mg/mL injection 60 mg by SubCUTAneous route.  diltiazem hcl (CARDIZEM) 120 mg tablet Take 120 mg by mouth daily.  ALPRAZolam (XANAX) 1 mg tablet Take 0.5 mg by mouth as needed. Allergies Allergen Reactions Glenora Bacca [Lurasidone] Other (comments) Memory loss  Neurontin [Gabapentin] Other (comments) PE: There is no tenderness to palpation about the right wrist.  Specifically there is no tenderness about the ulna. With terminal supination and pronation there is some pain in the area however mild Right Upper Extremity:  
 
Imaging: Plain films of the right wrist 3 views shows some increased callus formation however still lack of complete bony union. ICD-10-CM ICD-9-CM 1. 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  
2. Closed torus fracture of distal end of right ulna with delayed healing, subsequent encounter S52.621G V54.12 AMB POC XRAY, WRIST; COMPLETE, 3+ VIE Plan: At this point patient is having very minimal pain however there is some pain with rotation. Given this and the fact that there is incomplete union at the distal ulna we will order a bone stimulator to assist with healing. Follow-up in 3 months for reevaluation and x-rays. Plan was reviewed with patient, who verbalized agreement and understanding of the plan

## 2019-05-15 NOTE — PATIENT INSTRUCTIONS
Wrist Fracture: Rehab Exercises Your Care Instructions Here are some examples of typical rehabilitation exercises for your condition. Start each exercise slowly. Ease off the exercise if you start to have pain. Your doctor or your physical or occupational therapist will tell you when you can start these exercises and which ones will work best for you. How to do the exercises Wrist flexion and extension 1. Place your forearm on a table, with your hand and affected wrist extended beyond the table, palm down. 2. Bend your wrist to move your hand upward and allow your hand to close into a fist, then lower your hand and allow your fingers to relax. Hold each position for about 6 seconds. 3. Repeat 8 to 12 times. Hand flips 1. While seated, place your forearm and affected wrist on your thigh, palm down. 2. Flip your hand over so the back of your hand rests on your thigh and your palm is up. Alternate between palm up and palm down while keeping your forearm on your thigh. 3. Repeat 8 to 12 times. Wrist radial and ulnar deviation 1. Hold your affected hand out in front of you, palm down. 2. Slowly bend your wrist as far as you can from side to side. Hold each position for about 6 seconds. 3. Repeat 8 to 12 times. Wrist extensor stretch 1. Extend the arm with the affected wrist in front of you and point your fingers toward the floor. 2. With your other hand, gently bend your wrist farther until you feel a mild to moderate stretch in your forearm. 3. Hold the stretch for at least 15 to 30 seconds. 4. Repeat 2 to 4 times. 5. When you can do this stretch with ease and no pain, repeat steps 1 through 4. But this time extend your affected arm in front of you and make a fist with your palm facing down. Then bend your wrist, pointing your fist toward the floor. Wrist flexor stretch 1. Extend the arm with the affected wrist in front of you with your palm facing away from your body. 2. Bend back your wrist, pointing your hand up toward the ceiling. 3. With your other hand, gently bend your wrist farther until you feel a mild to moderate stretch in your forearm. 4. Hold the stretch for at least 15 to 30 seconds. 5. Repeat 2 to 4 times. 6. Repeat steps 1 through 5, but this time extend your affected arm in front of you with your palm facing up. Then bend back your wrist, pointing your hand toward the floor. Intrinsic flexion 1. Rest the hand with the affected wrist on a table and bend the large joints where your fingers connect to your hand. Keep your thumb and the other joints in your fingers straight. 2. Slowly straighten your fingers. Your wrist should be relaxed, following the line of your fingers and thumb. 3. Move back to your starting position, with your hand bent. 4. Repeat 8 to 12 times. MP extension 1. Place your good hand on a table, palm up. Put the hand with the affected wrist on top of your good hand with your fingers wrapped around the thumb of your good hand like you are making a fist. 
2. Slowly uncurl the joints of the hand with the affected wrist where your fingers connect to your hand so that only the top two joints of your fingers are bent. Your fingers will look like a hook. Hold the position for about 6 seconds. 3. Move back to your starting position, with your fingers wrapped around your good thumb. 4. Repeat 8 to 12 times. Follow-up care is a key part of your treatment and safety. Be sure to make and go to all appointments, and call your doctor if you are having problems. It's also a good idea to know your test results and keep a list of the medicines you take. Where can you learn more? Go to http://ammy-landon.info/. Enter [id number] in the search box to learn more about \"Wrist Fracture: Rehab Exercises. \" Current as of: September 20, 2018 Content Version: 11.9 © 2244-0522 [employer]. Care instructions adapted under license by [employer] (which disclaims liability or warranty for this information). If you have questions about a medical condition or this instruction, always ask your healthcare professional. [address] any warranty or liability for your use of this information.

## 2019-07-30 ENCOUNTER — OFFICE VISIT (OUTPATIENT)
Dept: ORTHOPEDIC SURGERY | Age: 79
End: 2019-07-30

## 2019-07-30 VITALS
SYSTOLIC BLOOD PRESSURE: 159 MMHG | RESPIRATION RATE: 11 BRPM | TEMPERATURE: 97 F | BODY MASS INDEX: 27.82 KG/M2 | HEART RATE: 94 BPM | HEIGHT: 63 IN | DIASTOLIC BLOOD PRESSURE: 72 MMHG | OXYGEN SATURATION: 94 % | WEIGHT: 157 LBS

## 2019-07-30 DIAGNOSIS — M25.561 RIGHT KNEE PAIN, UNSPECIFIED CHRONICITY: ICD-10-CM

## 2019-07-30 DIAGNOSIS — M17.11 PRIMARY OSTEOARTHRITIS OF RIGHT KNEE: Primary | ICD-10-CM

## 2019-07-30 RX ORDER — TRIAMCINOLONE ACETONIDE 40 MG/ML
40 INJECTION, SUSPENSION INTRA-ARTICULAR; INTRAMUSCULAR ONCE
Qty: 1 ML | Refills: 0
Start: 2019-07-30 | End: 2019-07-30

## 2019-07-30 RX ORDER — TIZANIDINE HYDROCHLORIDE 2 MG/1
2 CAPSULE, GELATIN COATED ORAL 3 TIMES DAILY
COMMUNITY
End: 2022-01-30

## 2019-07-30 RX ORDER — GLUCOSAMINE SULFATE 1500 MG
2000 POWDER IN PACKET (EA) ORAL DAILY
COMMUNITY

## 2019-07-30 RX ORDER — DOCUSATE CALCIUM 240 MG
240 CAPSULE ORAL 2 TIMES DAILY
COMMUNITY
End: 2022-01-30

## 2019-07-30 RX ORDER — OXYCODONE AND ACETAMINOPHEN 7.5; 325 MG/1; MG/1
TABLET ORAL
COMMUNITY
End: 2022-01-30

## 2019-07-30 NOTE — PROGRESS NOTES
1. Have you been to the ER, urgent care clinic since your last visit? Hospitalized since your last visit? No    2. Have you seen or consulted any other health care providers outside of the [address] [place] [address] since your last visit? Include any pap smears or colon screening.  No

## 2019-07-30 NOTE — PROGRESS NOTES
Jared Ellis  1940   Chief Complaint   Patient presents with    Knee Pain     RIGHT KNEE PAIN        HISTORY OF PRESENT ILLNESS  Jared Ellis is a 78 y.o. female who presents today for reevaluation of right knee pain. Patient rates pain as 6/10 today. Pain has been present for awhile. Pt reports she cannot bend her leg without pain. Patient denies any fever, chills, chest pain, shortness of breath or calf pain. The remainder of the review of systems is negative. There are no new illness or injuries to report since last seen in the office. There are no changes to medications, allergies, family or social history. Pain Assessment  7/30/2019   Location of Pain Knee   Location Modifiers Right   Severity of Pain 6   Quality of Pain Sharp; Aching   Quality of Pain Comment -   Duration of Pain A few hours   Frequency of Pain Intermittent   Aggravating Factors Standing;Walking   Limiting Behavior -   Relieving Factors Rest;Ice   Relieving Factors Comment -   Result of Injury No   Work-Related Injury -   Type of Injury -     PHYSICAL EXAM:   Visit Vitals  /72   Pulse 94   Temp 97 °F (36.1 °C) (Oral)   Resp 11   Ht 5' 3\" (1.6 m)   Wt 157 lb (71.2 kg)   SpO2 94%   BMI 27.81 kg/m²     The patient is a well-developed, well-nourished female   in no acute distress. The patient is alert and oriented times three. The patient is alert and oriented times three. Mood and affect are normal.  LYMPHATIC: lymph nodes are not enlarged and are within normal limits  SKIN: normal in color and non tender to palpation. There are no bruises or abrasions noted. NEUROLOGICAL: Motor sensory exam is within normal limits. Reflexes are equal bilaterally.  There is normal sensation to pinprick and light touch  MUSCULOSKELETAL:  Examination Right knee   Skin Intact   Range of motion    Effusion +   Medial joint line tenderness +   Lateral joint line tenderness +   Tenderness Pes Bursa -   Tenderness insertion MCL -   Tenderness insertion LCL -   Beverlys -   + +   Patella grind +   Lachman -   Pivot shift -   Anterior drawer -   Posterior drawer -   Varus stress -   Valgus stress -   Neurovascular Intact   Calf Swelling and Tenderness to Palpation -   Brandon's Test -   Hamstring Cord Tightness -     PROCEDURE: After sterile prep, 4 cc of Xylocaine and 1 cc of Kenalog were injected into the right knee. [facility]  OFFICE PROCEDURE PROGRESS NOTE        Chart reviewed for the following:  Chad Leonard MD, have reviewed the History, Physical and updated the Allergic reactions for [address] [place] [address],[address] performed immediately prior to start of procedure:  Chad Leonard MD, have performed the following reviews on Jonny Zamudio prior to the start of the procedure:            * Patient was identified by name and date of birth   * Agreement on procedure being performed was verified  * Risks and Benefits explained to the patient  * Procedure site verified and marked as necessary  * Patient was positioned for comfort  * Consent was signed and verified     Time: 2:40 PM    Date of procedure: 7/30/2019    Procedure performed by:  Torito Roth MD    Provider assisted by: (see medication administration)    How tolerated by patient: tolerated the procedure well with no complications    Comments: none        IMAGING: XR of right knee dated 7/30/19 was reviewed and read: Marked degenerative arthritis in lateral compartment of patellofemoral joint. IMPRESSION:      ICD-10-CM ICD-9-CM    1. Primary osteoarthritis of right knee M17.11 715.16 TRIAMCINOLONE ACETONIDE INJ      triamcinolone acetonide (KENALOG) 40 mg/mL injection      DRAIN/INJECT LARGE JOINT/BURSA   2. Right knee pain, unspecified chronicity M25.561 719.46 AMB POC XRAY, KNEE; 1/2 VIEWS        PLAN:   1.  Pt presents today with right knee pain due to primary OA and I would like to try an injection today. Risk factors include: htn  2. No ultrasound exam indicated today  3. Yes cortisone injection indicated today R KNEE  4. No Physical/Occupational Therapy indicated today  5. No diagnostic test indicated today:   6. No durable medical equipment indicated today  7. No referral indicated today   8. No medications indicated today:   9. No Narcotic indicated today     RTC 3 weeks if pain continues      Scribed by Richard Saldaña [address]) as dictated by MD LITA Alonzo, Dr. Romel Maurice, confirm that all documentation is accurate.     Romel Maurice M.D.   [facility]

## 2019-08-21 ENCOUNTER — OFFICE VISIT (OUTPATIENT)
Dept: ORTHOPEDIC SURGERY | Facility: CLINIC | Age: 79
End: 2019-08-21

## 2019-08-21 VITALS
OXYGEN SATURATION: 98 % | SYSTOLIC BLOOD PRESSURE: 148 MMHG | BODY MASS INDEX: 27.81 KG/M2 | HEART RATE: 93 BPM | TEMPERATURE: 97.9 F | HEIGHT: 63 IN | DIASTOLIC BLOOD PRESSURE: 79 MMHG | RESPIRATION RATE: 18 BRPM

## 2019-08-21 DIAGNOSIS — S52.621G: Primary | ICD-10-CM

## 2019-08-21 NOTE — PROGRESS NOTES
Carlos Alberto Soler is a 78 y.o. female right handed retiree. Worker's Compensation and legal considerations: not known. Vitals:    08/21/19 1559   BP: 148/79   Pulse: 93   Resp: 18   Temp: 97.9 °F (36.6 °C)   TempSrc: Oral   SpO2: 98%   Height: 5' 3\" (1.6 m)   PainSc:   0 - No pain   PainLoc: Wrist           Chief Complaint   Patient presents with    Wrist Pain     Right     HPI: Patient comes in today for follow-up approximately 6-1/2 months status post right distal ulna fracture. At her last visit 3 months ago there is still some pain with rotation of the wrist we ordered a bone stim later. She has been using a bone stimulator however has not use it issues. She reports no pain today with any motion or at rest.    Previous HPI: Patient comes in today 6 weeks status post fall and fracture to her right distal ulna. 2 weeks ago she was taken out of her cast that she had been in for 4 weeks and placed into a removable wrist splint. She has been wearing this all the time. She reports no pain today and only has pain when she twists her wrist a certain way. She denies any new injuries or falls. Initial HPI: Patient comes in today with a history of a fall. This was 4 weeks ago at which point she was placed into a cast.  She has been in a short arm cast for 4 weeks and she reports some pain and rubbing at the mid hand area. She denies any new falls or injuries. She reports the pain in her wrist is improved.     Date of onset: 2/2/2019    Injury: Yes: Comment: Fall    Prior Treatment:  Yes: Comment: Cast to right arm    Numbness/ Tingling: No    ROS: Review of Systems - General ROS: negative  Respiratory ROS: no cough, shortness of breath, or wheezing  Cardiovascular ROS: no chest pain or dyspnea on exertion  Musculoskeletal ROS: positive for - pain in hand - right  Neurological ROS: negative  Dermatological ROS: negative    Past Medical History:   Diagnosis Date    Arrhythmia     hx PVCs    Arthritis RHEUMATOID  and Osteo arthritis    Balance disorder 12/2016    Cancer ([facility] [address]     BREAST, broderick-broderick mastectomies    Constipation     Depression     Diverticular disease     Fibromyalgia     GERD (gastroesophageal reflux disease)     Headache(784.0)     Hearing reduced     hearing loss,ringing in ears    Hypertension     Insomnia     Neurological disorder     headaches,difficulty walking,poor memory    Osteoporosis     Other ill-defined conditions(799.89)     absessed perforated diverticulum    Other ill-defined conditions(799.89)     osteoporosis    Psychiatric disorder     Sleep apnea     CPAP    Vision decreased     poor vision,       Past Surgical History:   Procedure Laterality Date    FOOT/TOES SURGERY PROC UNLISTED      HX BREAST AUGMENTATION  1996    Saline Implants    HX CHOLECYSTECTOMY      HX GI  2012    LOW ANTERIOR RESECTION WITH COLOSTOMY AND LEFT OOPHORECTOMY    HX KNEE ARTHROSCOPY Right     HX MASTECTOMY  1996    bilateral mastectomy    HX ORTHOPAEDIC      RIGHT FOOT AND ANKLE SX    HX ORTHOPAEDIC Left 2017    wrist repair    HX OTHER SURGICAL      multiple sx right leg and foot       Current Outpatient Medications   Medication Sig Dispense Refill    oxyCODONE-acetaminophen (PERCOCET) 7.5-325 mg per tablet Take  by mouth.  cholecalciferol (VITAMIN D3) 1,000 unit cap Take  by mouth daily.  brexpiprazole (REXULTI) 1 mg tab tablet Take  by mouth daily.  tiZANidine (ZANAFLEX) 2 mg capsule Take 2 mg by mouth three (3) times daily.  docusate calcium (SURFAK) 240 mg capsule Take 240 mg by mouth two (2) times a day.  HYDROcodone-acetaminophen (NORCO)  mg tablet hydrocodone 10 mg-acetaminophen 325 mg tablet      esomeprazole (NEXIUM) 20 mg capsule Take 20 mg by mouth daily.  senna-docusate (PERICOLACE) 8.6-50 mg per tablet Take 1 Tab by mouth daily.  60 Tab 0    promethazine (PHENERGAN) 25 mg tablet Take 1 Tab by mouth every six (6) hours as needed for Nausea. 30 Tab 0    polyethylene glycol (MIRALAX) 17 gram packet Take 1 Packet by mouth daily. 10 Packet 1    Calcium-Cholecalciferol, D3, 600 mg(1,500mg) -400 unit cap Take  by mouth.  atorvastatin (LIPITOR) 10 mg tablet Take  by mouth daily.  lurasidone (LATUDA) 20 mg tab tablet Take 40 mg by mouth.  ondansetron (ZOFRAN ODT) 4 mg disintegrating tablet Take 4 mg by mouth every eight (8) hours as needed for Nausea. Indications: ACUTE GASTROENTERITIS-RELATED VOMITING IN PEDIATRICS      buPROPion XL (WELLBUTRIN XL) 150 mg tablet Take 300 mg by mouth every morning.  ALPRAZolam (XANAX) 1 mg tablet Take 0.5 mg by mouth as needed.  traZODone (DESYREL) 50 mg tablet Take 150 mg by mouth nightly. Indications: INSOMNIA      amLODIPine (NORVASC) 5 mg tablet Take 5 mg by mouth daily.  oxyMORphone 5 mg PO ER tablet Take one tablet at bedtime for chronic, severe pain 30 Tab 0    triamcinolone acetonide (KENALOG) 0.1 % ointment Apply  to affected area. use thin layer      Denosumab (PROLIA) 60 mg/mL injection 60 mg by SubCUTAneous route.  diltiazem hcl (CARDIZEM) 120 mg tablet Take 120 mg by mouth daily. Allergies   Allergen Reactions    Latuda [Lurasidone] Other (comments)     Memory loss    Neurontin [Gabapentin] Other (comments)     PATIENT STATES SHE IS NOT ALLERGIC         PE:     Right Upper Extremity: There is no tenderness to palpation about the right wrist.  Range of motion flexion extension supination pronation ulnar deviation and radial deviation is completely painless. Range of motion is also full. Imaging:     Plain films of the right wrist show substantial callus formation especially on the lateral viewWith more than 80% healing        ICD-10-CM ICD-9-CM    1. Closed torus fracture of distal end of right ulna with delayed healing, subsequent encounter S52.621G V54.12 AMB POC XRAY, WRIST; COMPLETE, 3+ VIE       Plan:     At this point considering the patient is not having any more pain and there is substantial healing noticed on x-rays we will have her continue the posting later for another couple months and then just follow-up as needed.     Plan was reviewed with patient, who verbalized agreement and understanding of the plan

## 2022-01-26 ENCOUNTER — APPOINTMENT (OUTPATIENT)
Dept: GENERAL RADIOLOGY | Age: 82
DRG: 056 | End: 2022-01-26
Attending: PHYSICIAN ASSISTANT
Payer: MEDICARE

## 2022-01-26 ENCOUNTER — HOSPITAL ENCOUNTER (EMERGENCY)
Dept: CT IMAGING | Age: 82
Discharge: HOME | DRG: 056 | End: 2022-01-26
Attending: PHYSICIAN ASSISTANT
Payer: MEDICARE

## 2022-01-26 ENCOUNTER — APPOINTMENT (OUTPATIENT)
Dept: MRI IMAGING | Age: 82
DRG: 056 | End: 2022-01-26
Attending: PHYSICIAN ASSISTANT
Payer: MEDICARE

## 2022-01-26 ENCOUNTER — APPOINTMENT (OUTPATIENT)
Dept: CT IMAGING | Age: 82
DRG: 056 | End: 2022-01-26
Attending: PHYSICIAN ASSISTANT
Payer: MEDICARE

## 2022-01-26 ENCOUNTER — HOSPITAL ENCOUNTER (INPATIENT)
Age: 82
LOS: 4 days | Discharge: HOME HEALTH | DRG: 056 | End: 2022-01-30
Attending: STUDENT IN AN ORGANIZED HEALTH CARE EDUCATION/TRAINING PROGRAM | Admitting: FAMILY MEDICINE
Payer: MEDICARE

## 2022-01-26 DIAGNOSIS — N30.01 ACUTE CYSTITIS WITH HEMATURIA: ICD-10-CM

## 2022-01-26 DIAGNOSIS — R50.9 FEVER, UNSPECIFIED FEVER CAUSE: Primary | ICD-10-CM

## 2022-01-26 DIAGNOSIS — K57.92 DIVERTICULITIS: ICD-10-CM

## 2022-01-26 DIAGNOSIS — R33.9 URINARY RETENTION: ICD-10-CM

## 2022-01-26 PROBLEM — N39.0 UTI (URINARY TRACT INFECTION): Status: ACTIVE | Noted: 2022-01-26

## 2022-01-26 PROBLEM — R53.1 WEAKNESS: Status: ACTIVE | Noted: 2022-01-26

## 2022-01-26 PROBLEM — Z74.09 LIMITED MOBILITY: Status: ACTIVE | Noted: 2022-01-26

## 2022-01-26 LAB
ALBUMIN SERPL-MCNC: 3.9 G/DL (ref 3.4–5)
ALBUMIN/GLOB SERPL: 1.1 {RATIO} (ref 0.8–1.7)
ALP SERPL-CCNC: 79 U/L (ref 45–117)
ALT SERPL-CCNC: 26 U/L (ref 13–56)
ANION GAP SERPL CALC-SCNC: 6 MMOL/L (ref 3–18)
APPEARANCE UR: CLEAR
AST SERPL-CCNC: 13 U/L (ref 10–38)
BACTERIA URNS QL MICRO: ABNORMAL /HPF
BASOPHILS # BLD: 0 K/UL (ref 0–0.1)
BASOPHILS NFR BLD: 1 % (ref 0–2)
BILIRUB SERPL-MCNC: 0.7 MG/DL (ref 0.2–1)
BILIRUB UR QL: NEGATIVE
BUN SERPL-MCNC: 21 MG/DL (ref 7–18)
BUN/CREAT SERPL: 22 (ref 12–20)
CALCIUM SERPL-MCNC: 9.9 MG/DL (ref 8.5–10.1)
CHLORIDE SERPL-SCNC: 98 MMOL/L (ref 100–111)
CO2 SERPL-SCNC: 30 MMOL/L (ref 21–32)
COLOR UR: YELLOW
CREAT SERPL-MCNC: 0.96 MG/DL (ref 0.6–1.3)
DIFFERENTIAL METHOD BLD: ABNORMAL
EOSINOPHIL # BLD: 0 K/UL (ref 0–0.4)
EOSINOPHIL NFR BLD: 0 % (ref 0–5)
EPITH CASTS URNS QL MICRO: ABNORMAL /LPF (ref 0–5)
ERYTHROCYTE [DISTWIDTH] IN BLOOD BY AUTOMATED COUNT: 13.1 % (ref 11.6–14.5)
GLOBULIN SER CALC-MCNC: 3.6 G/DL (ref 2–4)
GLUCOSE SERPL-MCNC: 126 MG/DL (ref 74–99)
GLUCOSE UR STRIP.AUTO-MCNC: NEGATIVE MG/DL
HCT VFR BLD AUTO: 45.2 % (ref 35–45)
HGB BLD-MCNC: 14.8 G/DL (ref 12–16)
HGB UR QL STRIP: ABNORMAL
IMM GRANULOCYTES # BLD AUTO: 0 K/UL (ref 0–0.04)
IMM GRANULOCYTES NFR BLD AUTO: 0 % (ref 0–0.5)
KETONES UR QL STRIP.AUTO: NEGATIVE MG/DL
LACTATE BLD-SCNC: 1.04 MMOL/L (ref 0.4–2)
LEUKOCYTE ESTERASE UR QL STRIP.AUTO: NEGATIVE
LYMPHOCYTES # BLD: 0.3 K/UL (ref 0.9–3.6)
LYMPHOCYTES NFR BLD: 5 % (ref 21–52)
MCH RBC QN AUTO: 31.4 PG (ref 24–34)
MCHC RBC AUTO-ENTMCNC: 32.7 G/DL (ref 31–37)
MCV RBC AUTO: 95.8 FL (ref 78–100)
MONOCYTES # BLD: 0.6 K/UL (ref 0.05–1.2)
MONOCYTES NFR BLD: 11 % (ref 3–10)
NEUTS SEG # BLD: 4.5 K/UL (ref 1.8–8)
NEUTS SEG NFR BLD: 83 % (ref 40–73)
NITRITE UR QL STRIP.AUTO: POSITIVE
NRBC # BLD: 0 K/UL (ref 0–0.01)
NRBC BLD-RTO: 0 PER 100 WBC
PH UR STRIP: 6.5 [PH] (ref 5–8)
PLATELET # BLD AUTO: 160 K/UL (ref 135–420)
PMV BLD AUTO: 10.2 FL (ref 9.2–11.8)
POTASSIUM SERPL-SCNC: 3.9 MMOL/L (ref 3.5–5.5)
PROT SERPL-MCNC: 7.5 G/DL (ref 6.4–8.2)
PROT UR STRIP-MCNC: NEGATIVE MG/DL
RBC # BLD AUTO: 4.72 M/UL (ref 4.2–5.3)
RBC #/AREA URNS HPF: ABNORMAL /HPF (ref 0–5)
SODIUM SERPL-SCNC: 134 MMOL/L (ref 136–145)
SP GR UR REFRACTOMETRY: 1.01 (ref 1–1.03)
UROBILINOGEN UR QL STRIP.AUTO: 0.2 EU/DL (ref 0.2–1)
WBC # BLD AUTO: 5.4 K/UL (ref 4.6–13.2)
WBC URNS QL MICRO: ABNORMAL /HPF (ref 0–4)

## 2022-01-26 PROCEDURE — 99285 EMERGENCY DEPT VISIT HI MDM: CPT

## 2022-01-26 PROCEDURE — 72156 MRI NECK SPINE W/O & W/DYE: CPT

## 2022-01-26 PROCEDURE — 74011000636 HC RX REV CODE- 636: Performed by: STUDENT IN AN ORGANIZED HEALTH CARE EDUCATION/TRAINING PROGRAM

## 2022-01-26 PROCEDURE — 71045 X-RAY EXAM CHEST 1 VIEW: CPT

## 2022-01-26 PROCEDURE — 74011000258 HC RX REV CODE- 258: Performed by: PHYSICIAN ASSISTANT

## 2022-01-26 PROCEDURE — 87186 SC STD MICRODIL/AGAR DIL: CPT

## 2022-01-26 PROCEDURE — A9577 INJ MULTIHANCE: HCPCS | Performed by: STUDENT IN AN ORGANIZED HEALTH CARE EDUCATION/TRAINING PROGRAM

## 2022-01-26 PROCEDURE — 87077 CULTURE AEROBIC IDENTIFY: CPT

## 2022-01-26 PROCEDURE — 74011250636 HC RX REV CODE- 250/636: Performed by: PHYSICIAN ASSISTANT

## 2022-01-26 PROCEDURE — 87040 BLOOD CULTURE FOR BACTERIA: CPT

## 2022-01-26 PROCEDURE — 93005 ELECTROCARDIOGRAM TRACING: CPT

## 2022-01-26 PROCEDURE — 72125 CT NECK SPINE W/O DYE: CPT

## 2022-01-26 PROCEDURE — 87086 URINE CULTURE/COLONY COUNT: CPT

## 2022-01-26 PROCEDURE — 74177 CT ABD & PELVIS W/CONTRAST: CPT

## 2022-01-26 PROCEDURE — 74011250637 HC RX REV CODE- 250/637: Performed by: PHYSICIAN ASSISTANT

## 2022-01-26 PROCEDURE — 94761 N-INVAS EAR/PLS OXIMETRY MLT: CPT

## 2022-01-26 PROCEDURE — 81001 URINALYSIS AUTO W/SCOPE: CPT

## 2022-01-26 PROCEDURE — 80053 COMPREHEN METABOLIC PANEL: CPT

## 2022-01-26 PROCEDURE — 96366 THER/PROPH/DIAG IV INF ADDON: CPT

## 2022-01-26 PROCEDURE — 96375 TX/PRO/DX INJ NEW DRUG ADDON: CPT

## 2022-01-26 PROCEDURE — 96365 THER/PROPH/DIAG IV INF INIT: CPT

## 2022-01-26 PROCEDURE — 0202U NFCT DS 22 TRGT SARS-COV-2: CPT

## 2022-01-26 PROCEDURE — 65270000029 HC RM PRIVATE

## 2022-01-26 PROCEDURE — 70450 CT HEAD/BRAIN W/O DYE: CPT

## 2022-01-26 PROCEDURE — 83605 ASSAY OF LACTIC ACID: CPT

## 2022-01-26 PROCEDURE — 74011250636 HC RX REV CODE- 250/636: Performed by: STUDENT IN AN ORGANIZED HEALTH CARE EDUCATION/TRAINING PROGRAM

## 2022-01-26 PROCEDURE — 72158 MRI LUMBAR SPINE W/O & W/DYE: CPT

## 2022-01-26 PROCEDURE — 72157 MRI CHEST SPINE W/O & W/DYE: CPT

## 2022-01-26 PROCEDURE — 85025 COMPLETE CBC W/AUTO DIFF WBC: CPT

## 2022-01-26 RX ORDER — BUPROPION HYDROCHLORIDE 300 MG/1
300 TABLET ORAL
Status: DISCONTINUED | OUTPATIENT
Start: 2022-01-27 | End: 2022-01-30 | Stop reason: HOSPADM

## 2022-01-26 RX ORDER — ACETAMINOPHEN 650 MG/1
650 SUPPOSITORY RECTAL
Status: DISCONTINUED | OUTPATIENT
Start: 2022-01-26 | End: 2022-01-30 | Stop reason: HOSPADM

## 2022-01-26 RX ORDER — POLYETHYLENE GLYCOL 3350 17 G/17G
17 POWDER, FOR SOLUTION ORAL DAILY PRN
Status: DISCONTINUED | OUTPATIENT
Start: 2022-01-26 | End: 2022-01-28

## 2022-01-26 RX ORDER — SODIUM CHLORIDE 0.9 % (FLUSH) 0.9 %
5-40 SYRINGE (ML) INJECTION EVERY 8 HOURS
Status: DISCONTINUED | OUTPATIENT
Start: 2022-01-26 | End: 2022-01-30 | Stop reason: HOSPADM

## 2022-01-26 RX ORDER — TRAZODONE HYDROCHLORIDE 50 MG/1
100 TABLET ORAL
Status: DISCONTINUED | OUTPATIENT
Start: 2022-01-26 | End: 2022-01-30 | Stop reason: HOSPADM

## 2022-01-26 RX ORDER — ACETAMINOPHEN 325 MG/1
650 TABLET ORAL
Status: DISCONTINUED | OUTPATIENT
Start: 2022-01-26 | End: 2022-01-30 | Stop reason: HOSPADM

## 2022-01-26 RX ORDER — CIPROFLOXACIN 2 MG/ML
400 INJECTION, SOLUTION INTRAVENOUS EVERY 12 HOURS
Status: DISCONTINUED | OUTPATIENT
Start: 2022-01-26 | End: 2022-01-26

## 2022-01-26 RX ORDER — SODIUM CHLORIDE 0.9 % (FLUSH) 0.9 %
5-10 SYRINGE (ML) INJECTION AS NEEDED
Status: DISCONTINUED | OUTPATIENT
Start: 2022-01-26 | End: 2022-01-30 | Stop reason: HOSPADM

## 2022-01-26 RX ORDER — SODIUM CHLORIDE 0.9 % (FLUSH) 0.9 %
5-40 SYRINGE (ML) INJECTION AS NEEDED
Status: DISCONTINUED | OUTPATIENT
Start: 2022-01-26 | End: 2022-01-30 | Stop reason: HOSPADM

## 2022-01-26 RX ORDER — MELATONIN
2000 DAILY
Status: DISCONTINUED | OUTPATIENT
Start: 2022-01-27 | End: 2022-01-30 | Stop reason: HOSPADM

## 2022-01-26 RX ORDER — ATORVASTATIN CALCIUM 10 MG/1
10 TABLET, FILM COATED ORAL DAILY
Status: DISCONTINUED | OUTPATIENT
Start: 2022-01-27 | End: 2022-01-30 | Stop reason: HOSPADM

## 2022-01-26 RX ORDER — HEPARIN SODIUM 5000 [USP'U]/ML
5000 INJECTION, SOLUTION INTRAVENOUS; SUBCUTANEOUS EVERY 8 HOURS
Status: DISCONTINUED | OUTPATIENT
Start: 2022-01-27 | End: 2022-01-30 | Stop reason: HOSPADM

## 2022-01-26 RX ORDER — METRONIDAZOLE 500 MG/100ML
500 INJECTION, SOLUTION INTRAVENOUS EVERY 12 HOURS
Status: DISCONTINUED | OUTPATIENT
Start: 2022-01-26 | End: 2022-01-26

## 2022-01-26 RX ORDER — HYDROCODONE BITARTRATE AND ACETAMINOPHEN 5; 325 MG/1; MG/1
1 TABLET ORAL
Status: COMPLETED | OUTPATIENT
Start: 2022-01-26 | End: 2022-01-26

## 2022-01-26 RX ORDER — ACETAMINOPHEN 325 MG/1
650 TABLET ORAL
Status: COMPLETED | OUTPATIENT
Start: 2022-01-26 | End: 2022-01-26

## 2022-01-26 RX ORDER — LORAZEPAM 2 MG/ML
1 INJECTION INTRAMUSCULAR
Status: COMPLETED | OUTPATIENT
Start: 2022-01-26 | End: 2022-01-26

## 2022-01-26 RX ORDER — ALPRAZOLAM 0.25 MG/1
0.25 TABLET ORAL
Status: DISCONTINUED | OUTPATIENT
Start: 2022-01-26 | End: 2022-01-30 | Stop reason: HOSPADM

## 2022-01-26 RX ORDER — HYDROCODONE BITARTRATE AND ACETAMINOPHEN 10; 325 MG/1; MG/1
1 TABLET ORAL
Status: DISCONTINUED | OUTPATIENT
Start: 2022-01-26 | End: 2022-01-28

## 2022-01-26 RX ORDER — AMLODIPINE BESYLATE 5 MG/1
5 TABLET ORAL DAILY
Status: DISCONTINUED | OUTPATIENT
Start: 2022-01-27 | End: 2022-01-30 | Stop reason: HOSPADM

## 2022-01-26 RX ADMIN — LORAZEPAM 1 MG: 2 INJECTION INTRAMUSCULAR at 14:27

## 2022-01-26 RX ADMIN — ACETAMINOPHEN 650 MG: 325 TABLET ORAL at 13:00

## 2022-01-26 RX ADMIN — IOPAMIDOL 80 ML: 612 INJECTION, SOLUTION INTRAVENOUS at 18:50

## 2022-01-26 RX ADMIN — HYDROCODONE BITARTRATE AND ACETAMINOPHEN 1 TABLET: 5; 325 TABLET ORAL at 21:14

## 2022-01-26 RX ADMIN — GADOBENATE DIMEGLUMINE 10 ML: 529 INJECTION, SOLUTION INTRAVENOUS at 16:54

## 2022-01-26 RX ADMIN — PIPERACILLIN AND TAZOBACTAM 4.5 G: 4; .5 INJECTION, POWDER, FOR SOLUTION INTRAVENOUS at 21:26

## 2022-01-26 NOTE — ED PROVIDER NOTES
EMERGENCY DEPARTMENT HISTORY AND PHYSICAL EXAM      Date: 1/26/2022  Patient Name: Marshal Valencia    History of Presenting Illness     Chief Complaint   Patient presents with    Back Pain       History Provided By: Patient, EMS, Patient's     HPI: Marshal Valencia, 80 y.o. female PMHx significant for, perforated diverticulum, sleep apnea, rheumatoid arthritis, depression, arthritis, htn, fibromyalgia, breast cancer presents via ambualnce to the ED. patient provides limited history. She states yesterday she was experience low back pain that worsened this morning. Reports upon waking she began experiencing urinary retention. Per  patient sleeps in recliner chair at baseline. He states patient was eating and acting normally yesterday. He states upon waking around 0815 this morning she was complaining of urinary retention.  states this is not normal for her. He then states patient attempted to get out of chair and slowly slid to the floor. He states patient is ambulatory with walker at baseline. He denies recent trauma or injury. Denies known recent surgeries. There are no other complaints, changes, or physical findings at this time. PCP: Capri Leong MD    No current facility-administered medications on file prior to encounter. Current Outpatient Medications on File Prior to Encounter   Medication Sig Dispense Refill    oxyCODONE-acetaminophen (PERCOCET) 7.5-325 mg per tablet Take  by mouth.  cholecalciferol (VITAMIN D3) 1,000 unit cap Take  by mouth daily.  brexpiprazole (REXULTI) 1 mg tab tablet Take  by mouth daily.  tiZANidine (ZANAFLEX) 2 mg capsule Take 2 mg by mouth three (3) times daily.  docusate calcium (SURFAK) 240 mg capsule Take 240 mg by mouth two (2) times a day.  HYDROcodone-acetaminophen (NORCO)  mg tablet hydrocodone 10 mg-acetaminophen 325 mg tablet      amLODIPine (NORVASC) 5 mg tablet Take 5 mg by mouth daily.  esomeprazole (NEXIUM) 20 mg capsule Take 20 mg by mouth daily.  oxyMORphone 5 mg PO ER tablet Take one tablet at bedtime for chronic, severe pain 30 Tab 0    senna-docusate (PERICOLACE) 8.6-50 mg per tablet Take 1 Tab by mouth daily. 60 Tab 0    triamcinolone acetonide (KENALOG) 0.1 % ointment Apply  to affected area. use thin layer      promethazine (PHENERGAN) 25 mg tablet Take 1 Tab by mouth every six (6) hours as needed for Nausea. 30 Tab 0    polyethylene glycol (MIRALAX) 17 gram packet Take 1 Packet by mouth daily. 10 Packet 1    Calcium-Cholecalciferol, D3, 600 mg(1,500mg) -400 unit cap Take  by mouth.  atorvastatin (LIPITOR) 10 mg tablet Take  by mouth daily.  lurasidone (LATUDA) 20 mg tab tablet Take 40 mg by mouth.  Denosumab (PROLIA) 60 mg/mL injection 60 mg by SubCUTAneous route.  ondansetron (ZOFRAN ODT) 4 mg disintegrating tablet Take 4 mg by mouth every eight (8) hours as needed for Nausea. Indications: ACUTE GASTROENTERITIS-RELATED VOMITING IN PEDIATRICS      buPROPion XL (WELLBUTRIN XL) 150 mg tablet Take 300 mg by mouth every morning.  diltiazem hcl (CARDIZEM) 120 mg tablet Take 120 mg by mouth daily.  ALPRAZolam (XANAX) 1 mg tablet Take 0.5 mg by mouth as needed.  traZODone (DESYREL) 50 mg tablet Take 150 mg by mouth nightly.  Indications: INSOMNIA         Past History     Past Medical History:  Past Medical History:   Diagnosis Date    Arrhythmia     hx PVCs    Arthritis     RHEUMATOID  and Osteo arthritis    Balance disorder 12/2016    Cancer ([facility] [address]     BREAST, broderick-broderick mastectomies    Constipation     Depression     Diverticular disease     Fibromyalgia     GERD (gastroesophageal reflux disease)     Headache(784.0)     Hearing reduced     hearing loss,ringing in ears    Hypertension     Insomnia     Neurological disorder     headaches,difficulty walking,poor memory    Osteoporosis     Other ill-defined conditions(799.89) absessed perforated diverticulum    Other ill-defined conditions(799.89)     osteoporosis    Psychiatric disorder     Sleep apnea     CPAP    Vision decreased     poor vision,       Past Surgical History:  Past Surgical History:   Procedure Laterality Date    FOOT/TOES SURGERY PROC UNLISTED      HX BREAST AUGMENTATION  1996    Saline Implants    HX CHOLECYSTECTOMY      HX GI  2012    LOW ANTERIOR RESECTION WITH COLOSTOMY AND LEFT OOPHORECTOMY    HX KNEE ARTHROSCOPY Right     HX MASTECTOMY  1996    bilateral mastectomy    HX ORTHOPAEDIC      RIGHT FOOT AND ANKLE SX    HX ORTHOPAEDIC Left 2017    wrist repair    HX OTHER SURGICAL      multiple sx right leg and foot       Family History:  Family History   Problem Relation Age of Onset    Hypertension Mother     Headache Mother     Diabetes Father     Hypertension Father     Heart Attack Father        Social History:  Social History     Tobacco Use    Smoking status: Former Smoker     Quit date: 11/25/2006     Years since quitting: 15.1    Smokeless tobacco: Never Used   Substance Use Topics    Alcohol use: No    Drug use: No       Allergies: Allergies   Allergen Reactions    Latuda [Lurasidone] Other (comments)     Memory loss    Neurontin [Gabapentin] Other (comments)     PATIENT STATES SHE IS NOT ALLERGIC         Review of Systems   Review of Systems   Constitutional: Negative for chills and fever. Respiratory: Negative for shortness of breath. Cardiovascular: Negative for chest pain. Gastrointestinal: Negative for abdominal pain, nausea and vomiting. Genitourinary: Negative for flank pain. Urinary retention   Musculoskeletal: Positive for back pain. Negative for myalgias. Skin: Negative for color change, pallor, rash and wound. Neurological: Negative for dizziness, weakness and light-headedness. All other systems reviewed and are negative. Physical Exam   Physical Exam  Vitals and nursing note reviewed. Constitutional:       General: She is not in acute distress. Appearance: She is well-developed. Comments: Pt in NAD   HENT:      Head: Normocephalic and atraumatic. Eyes:      Conjunctiva/sclera: Conjunctivae normal.   Cardiovascular:      Rate and Rhythm: Normal rate and regular rhythm. Heart sounds: Normal heart sounds. Pulmonary:      Effort: Pulmonary effort is normal. No respiratory distress. Breath sounds: Normal breath sounds. Abdominal:      General: Bowel sounds are normal. There is no distension. Palpations: Abdomen is soft. Tenderness: There is abdominal tenderness in the suprapubic area. Comments: Abdomen soft, nondistended  No guarding or rigidity   Musculoskeletal:         General: Normal range of motion. Comments: DP pulses strong and equal b/l  Sensation equal and intact to lower extremities b/l     Skin:     General: Skin is warm. Findings: No rash. Neurological:      Mental Status: She is alert and oriented to person, place, and time. Comments: Alert to person and place but not time  Speech clear  Facial muscles equal and intact  Strength 0/5 to lower extremities b/l  Sensation intact in all extremities  (-) pronator drift  No finger to nose dysmetria   Psychiatric:         Behavior: Behavior normal.         Diagnostic Study Results     Labs -   No results found for this or any previous visit (from the past 12 hour(s)). Radiologic Studies -   XR CHEST PORT    (Results Pending)   CT HEAD WO CONT    (Results Pending)   CT SPINE CERV WO CONT    (Results Pending)   MRI [facility] SPINE W WO CONT    (Results Pending)   MRI LUMB SPINE W WO CONT    (Results Pending)     CT Results  (Last 48 hours)    None        CXR Results  (Last 48 hours)    None          Medical Decision Making   I am the first provider for this patient.     I reviewed the vital signs, available nursing notes, past medical history, past surgical history, family history and social history. Vital Signs-Reviewed the patient's vital signs. Patient Vitals for the past 12 hrs:   Temp Pulse Resp BP SpO2   01/26/22 1103 -- -- -- -- 98 %   01/26/22 1053 (!) 100.6 °F (38.1 °C) 85 16 (!) 150/77 98 %         EKG interpretation: (Preliminary)  Rhythm: normal sinus rhythm; and regular . Rate (approx.): 94; Axis: normal; OK interval: normal; QRS interval: normal ; ST/T wave: non-specific changes; Other findings: normal.    No acute ischemic changes. Records Reviewed: Nursing Notes, Old Medical Records, Previous Radiology Studies and Previous Laboratory Studies    Provider Notes (Medical Decision Making):   DDx: Cauda equina syndrome, Epidural abscess, Cord compression, CVA, UTI    79 yo F who presents who presents with urinary retention and lower leg weaknes b/l that began this am. On initial exam, strength 0/5 to lower extremities b/l. Pulses and     ED Course:   Initial assessment performed. The patients presenting problems have been discussed, and they are in agreement with the care plan formulated and outlined with them. I have encouraged them to ask questions as they arise throughout their visit. 1700: Pt re-evaluated while receiving desai catheter. 2+ strength to lower legs observed during desai insertion. [phone]: Spoke with Dr Leah Nava, consult spine surgery. He reviewed pt's MRIs. He reports pt has extensive arthritis below her cervical fusion. He does not appreciate acute pathology to thoracic or lumbar spine on MRI. He recommends neurology consult with decadron as needed. 1800: Transfer of care to Renee LIVINGSTON at shift change. Plan: Awaiting MRI results, CT and UA. Attestations:    GURJIT Nieto    Please note that this dictation was completed with [employer], the computer voice recognition software. Quite often unanticipated grammatical, syntax, homophones, and other interpretive errors are inadvertently transcribed by the computer software.   Please disregard these errors. Please excuse any errors that have escaped final proofreading. Thank you.

## 2022-01-26 NOTE — PROGRESS NOTES
MRI Safety Screening form needs to be filled out and FAXED to [phone] BEFORE MRI can be scheduled. If unable to acquire information from patient, [facility] must be contacted, or screening xrays will need to be ordred.     If pt is Claustrophobic or needs Pain Meds, please have these ordered in advance to help facilitate MRI exam.

## 2022-01-26 NOTE — ED NOTES
Pt has been resting comfortably since returning from MRI. Her clothes have been removed, she was given a bed bath, new linens and pad changed. Patient expressed pain in her lower right abdomen when palpating. She rated her pain 8/10 and reported abdominal tenderness when touched. Noted an older, healed wound in the folds of her buttocks.

## 2022-01-26 NOTE — ED NOTES
6:00 PM Assumed care of the pt at this time. Discussed with GURJIT Quinones concerning patient Ivanna Brizuela, standard discussion of reason for visit, HPI, ROS, PE, and current results available. Recommendation for obtaining pending MRI results, CT abdomen pelvis results, and urinalysis followed by bedside reevaluation to dispo the patient. Bedside turnover was completed. Patient is now able to move both of her legs however decreased strength is noted. Full sensation is intact to the bilateral lower extremities. The patient states she notes pain across the lumbar spine with any movement of her bilateral legs. Patient is still too weak to ambulate or stand on her own. We will plan to reevaluate the patient once her imaging is completed. Lindalee Junes was added for pain. Citlaly Duran PA-C     10:12 PM MRI prelim resulted, no acute changes, degenerative changes noted. UA consistent with UTI, sent for culture and abx ordered. CT abd/pelvis shows mild diverticulitis. Initially Cipro and Flagyl ordered to cover for UTI and diverticulitis. IV Flagyl is on national back order. Consulted with the pharmacist on-call. Medication was changed to Zosyn which would give coverage for both urinary tract infection as well as diverticulitis. Patient currently has a Aly catheter in place. She is still unable to ambulate or weight-bear at this time which is off from her baseline. The patient's range of motion of her lower extremities is improving however she is still very weak to the bilateral lower extremities. We will plan to consult for admission at this time. Citlaly Duran PA-C     11:00 PM Spoke with [facility] resident on call and discussed this pt. Agrees to accept for admission. Citlaly Duran PA-C     Disposition: admitted     Dictation disclaimer:  Please note that this dictation was completed with [employer], the [employer] voice recognition software.   Quite often unanticipated grammatical, syntax, homophones, and other interpretive errors are inadvertently transcribed by the computer software. Please disregard these errors. Please excuse any errors that have escaped final proofreading.

## 2022-01-26 NOTE — PROGRESS NOTES
MRI Screening form needs to be filled out and faxed to [phone] MRI can be scheduled. If unable to obtain information from pt, [facility] needs to be contacted.  If pt is claustro or will need pain meds, please have ordered in advance in order to facilitate exam.

## 2022-01-26 NOTE — ED NOTES
This nurse performed bladder scan on patient. Bladder scan showed residual urine in the amounts of 519ml and 508 ml. Pt c/o right lower abdomen tenderness and pain rated 8/10.

## 2022-01-26 NOTE — ED TRIAGE NOTES
Pt arrived via [employer]. Per medic, Pt was laying on the floor of her residence complaining of lower back pain and urinary retention for an unknown amount of time.

## 2022-01-27 LAB
ANION GAP SERPL CALC-SCNC: 6 MMOL/L (ref 3–18)
ANION GAP SERPL CALC-SCNC: 7 MMOL/L (ref 3–18)
B PERT DNA SPEC QL NAA+PROBE: NOT DETECTED
BASOPHILS # BLD: 0 K/UL (ref 0–0.1)
BASOPHILS NFR BLD: 0 % (ref 0–2)
BORDETELLA PARAPERTUSSIS PCR, BORPAR: NOT DETECTED
BUN SERPL-MCNC: 18 MG/DL (ref 7–18)
BUN SERPL-MCNC: 24 MG/DL (ref 7–18)
BUN/CREAT SERPL: 20 (ref 12–20)
BUN/CREAT SERPL: 22 (ref 12–20)
C PNEUM DNA SPEC QL NAA+PROBE: NOT DETECTED
CALCIUM SERPL-MCNC: 9.4 MG/DL (ref 8.5–10.1)
CALCIUM SERPL-MCNC: 9.5 MG/DL (ref 8.5–10.1)
CHLORIDE SERPL-SCNC: 101 MMOL/L (ref 100–111)
CHLORIDE SERPL-SCNC: 102 MMOL/L (ref 100–111)
CO2 SERPL-SCNC: 27 MMOL/L (ref 21–32)
CO2 SERPL-SCNC: 29 MMOL/L (ref 21–32)
CREAT SERPL-MCNC: 0.9 MG/DL (ref 0.6–1.3)
CREAT SERPL-MCNC: 1.09 MG/DL (ref 0.6–1.3)
D DIMER PPP FEU-MCNC: 1.02 UG/ML(FEU)
DIFFERENTIAL METHOD BLD: ABNORMAL
EOSINOPHIL # BLD: 0 K/UL (ref 0–0.4)
EOSINOPHIL NFR BLD: 0 % (ref 0–5)
ERYTHROCYTE [DISTWIDTH] IN BLOOD BY AUTOMATED COUNT: 13.1 % (ref 11.6–14.5)
ERYTHROCYTE [SEDIMENTATION RATE] IN BLOOD: 14 MM/HR (ref 0–30)
FERRITIN SERPL-MCNC: 209 NG/ML (ref 8–388)
FLUAV H1 2009 PAND RNA SPEC QL NAA+PROBE: NOT DETECTED
FLUAV H1 RNA SPEC QL NAA+PROBE: NOT DETECTED
FLUAV H3 RNA SPEC QL NAA+PROBE: NOT DETECTED
FLUAV SUBTYP SPEC NAA+PROBE: NOT DETECTED
FLUBV RNA SPEC QL NAA+PROBE: NOT DETECTED
GLUCOSE SERPL-MCNC: 104 MG/DL (ref 74–99)
GLUCOSE SERPL-MCNC: 97 MG/DL (ref 74–99)
HADV DNA SPEC QL NAA+PROBE: NOT DETECTED
HCOV 229E RNA SPEC QL NAA+PROBE: NOT DETECTED
HCOV HKU1 RNA SPEC QL NAA+PROBE: NOT DETECTED
HCOV NL63 RNA SPEC QL NAA+PROBE: NOT DETECTED
HCOV OC43 RNA SPEC QL NAA+PROBE: NOT DETECTED
HCT VFR BLD AUTO: 43.8 % (ref 35–45)
HGB BLD-MCNC: 14.5 G/DL (ref 12–16)
HMPV RNA SPEC QL NAA+PROBE: NOT DETECTED
HPIV1 RNA SPEC QL NAA+PROBE: NOT DETECTED
HPIV2 RNA SPEC QL NAA+PROBE: NOT DETECTED
HPIV3 RNA SPEC QL NAA+PROBE: NOT DETECTED
HPIV4 RNA SPEC QL NAA+PROBE: NOT DETECTED
IMM GRANULOCYTES # BLD AUTO: 0 K/UL (ref 0–0.04)
IMM GRANULOCYTES NFR BLD AUTO: 0 % (ref 0–0.5)
LYMPHOCYTES # BLD: 0.8 K/UL (ref 0.9–3.6)
LYMPHOCYTES NFR BLD: 18 % (ref 21–52)
M PNEUMO DNA SPEC QL NAA+PROBE: NOT DETECTED
MAGNESIUM SERPL-MCNC: 2.4 MG/DL (ref 1.6–2.6)
MCH RBC QN AUTO: 31.5 PG (ref 24–34)
MCHC RBC AUTO-ENTMCNC: 33.1 G/DL (ref 31–37)
MCV RBC AUTO: 95 FL (ref 78–100)
MONOCYTES # BLD: 0.9 K/UL (ref 0.05–1.2)
MONOCYTES NFR BLD: 20 % (ref 3–10)
NEUTS SEG # BLD: 2.8 K/UL (ref 1.8–8)
NEUTS SEG NFR BLD: 61 % (ref 40–73)
NRBC # BLD: 0 K/UL (ref 0–0.01)
NRBC BLD-RTO: 0 PER 100 WBC
PLATELET # BLD AUTO: 156 K/UL (ref 135–420)
PMV BLD AUTO: 10.7 FL (ref 9.2–11.8)
POTASSIUM SERPL-SCNC: 3.1 MMOL/L (ref 3.5–5.5)
POTASSIUM SERPL-SCNC: 4 MMOL/L (ref 3.5–5.5)
PROCALCITONIN SERPL-MCNC: 0.06 NG/ML
RBC # BLD AUTO: 4.61 M/UL (ref 4.2–5.3)
RSV RNA SPEC QL NAA+PROBE: NOT DETECTED
RV+EV RNA SPEC QL NAA+PROBE: NOT DETECTED
SARS-COV-2 PCR, COVPCR: DETECTED
SODIUM SERPL-SCNC: 136 MMOL/L (ref 136–145)
SODIUM SERPL-SCNC: 136 MMOL/L (ref 136–145)
WBC # BLD AUTO: 4.5 K/UL (ref 4.6–13.2)

## 2022-01-27 PROCEDURE — 97166 OT EVAL MOD COMPLEX 45 MIN: CPT

## 2022-01-27 PROCEDURE — 82728 ASSAY OF FERRITIN: CPT

## 2022-01-27 PROCEDURE — 80048 BASIC METABOLIC PNL TOTAL CA: CPT

## 2022-01-27 PROCEDURE — 74011250636 HC RX REV CODE- 250/636: Performed by: STUDENT IN AN ORGANIZED HEALTH CARE EDUCATION/TRAINING PROGRAM

## 2022-01-27 PROCEDURE — 97162 PT EVAL MOD COMPLEX 30 MIN: CPT

## 2022-01-27 PROCEDURE — 85025 COMPLETE CBC W/AUTO DIFF WBC: CPT

## 2022-01-27 PROCEDURE — 74011250637 HC RX REV CODE- 250/637: Performed by: STUDENT IN AN ORGANIZED HEALTH CARE EDUCATION/TRAINING PROGRAM

## 2022-01-27 PROCEDURE — 85379 FIBRIN DEGRADATION QUANT: CPT

## 2022-01-27 PROCEDURE — 74011000258 HC RX REV CODE- 258: Performed by: STUDENT IN AN ORGANIZED HEALTH CARE EDUCATION/TRAINING PROGRAM

## 2022-01-27 PROCEDURE — 36415 COLL VENOUS BLD VENIPUNCTURE: CPT

## 2022-01-27 PROCEDURE — 83735 ASSAY OF MAGNESIUM: CPT

## 2022-01-27 PROCEDURE — 97535 SELF CARE MNGMENT TRAINING: CPT

## 2022-01-27 PROCEDURE — 65270000029 HC RM PRIVATE

## 2022-01-27 PROCEDURE — 84145 PROCALCITONIN (PCT): CPT

## 2022-01-27 PROCEDURE — 86141 C-REACTIVE PROTEIN HS: CPT

## 2022-01-27 PROCEDURE — 97530 THERAPEUTIC ACTIVITIES: CPT

## 2022-01-27 PROCEDURE — 74011000250 HC RX REV CODE- 250: Performed by: STUDENT IN AN ORGANIZED HEALTH CARE EDUCATION/TRAINING PROGRAM

## 2022-01-27 PROCEDURE — 99221 1ST HOSP IP/OBS SF/LOW 40: CPT | Performed by: STUDENT IN AN ORGANIZED HEALTH CARE EDUCATION/TRAINING PROGRAM

## 2022-01-27 PROCEDURE — 85652 RBC SED RATE AUTOMATED: CPT

## 2022-01-27 RX ORDER — POTASSIUM CHLORIDE 20 MEQ/1
20 TABLET, EXTENDED RELEASE ORAL
Status: DISPENSED | OUTPATIENT
Start: 2022-01-27 | End: 2022-01-27

## 2022-01-27 RX ORDER — CARBIDOPA AND LEVODOPA 25; 100 MG/1; MG/1
0.5 TABLET ORAL 3 TIMES DAILY
Status: DISCONTINUED | OUTPATIENT
Start: 2022-01-27 | End: 2022-01-30 | Stop reason: HOSPADM

## 2022-01-27 RX ORDER — LIDOCAINE 4 G/100G
2 PATCH TOPICAL EVERY 24 HOURS
Status: DISCONTINUED | OUTPATIENT
Start: 2022-01-27 | End: 2022-01-30 | Stop reason: HOSPADM

## 2022-01-27 RX ADMIN — HYDROCODONE BITARTRATE AND ACETAMINOPHEN 1 TABLET: 10; 325 TABLET ORAL at 20:46

## 2022-01-27 RX ADMIN — POTASSIUM CHLORIDE 20 MEQ: 1500 TABLET, EXTENDED RELEASE ORAL at 09:42

## 2022-01-27 RX ADMIN — AMLODIPINE BESYLATE 5 MG: 5 TABLET ORAL at 09:42

## 2022-01-27 RX ADMIN — PIPERACILLIN AND TAZOBACTAM 3.38 G: 3; .375 INJECTION, POWDER, LYOPHILIZED, FOR SOLUTION INTRAVENOUS at 05:08

## 2022-01-27 RX ADMIN — HEPARIN SODIUM 5000 UNITS: 5000 INJECTION, SOLUTION INTRAVENOUS; SUBCUTANEOUS at 16:26

## 2022-01-27 RX ADMIN — SODIUM CHLORIDE, PRESERVATIVE FREE 10 ML: 5 INJECTION INTRAVENOUS at 05:09

## 2022-01-27 RX ADMIN — HYDROCODONE BITARTRATE AND ACETAMINOPHEN 1 TABLET: 10; 325 TABLET ORAL at 16:25

## 2022-01-27 RX ADMIN — PIPERACILLIN AND TAZOBACTAM 3.38 G: 3; .375 INJECTION, POWDER, LYOPHILIZED, FOR SOLUTION INTRAVENOUS at 13:03

## 2022-01-27 RX ADMIN — HEPARIN SODIUM 5000 UNITS: 5000 INJECTION, SOLUTION INTRAVENOUS; SUBCUTANEOUS at 03:58

## 2022-01-27 RX ADMIN — HEPARIN SODIUM 5000 UNITS: 5000 INJECTION, SOLUTION INTRAVENOUS; SUBCUTANEOUS at 09:42

## 2022-01-27 RX ADMIN — TRAZODONE HYDROCHLORIDE 100 MG: 50 TABLET ORAL at 23:29

## 2022-01-27 RX ADMIN — ATORVASTATIN CALCIUM 10 MG: 10 TABLET, FILM COATED ORAL at 09:42

## 2022-01-27 RX ADMIN — SODIUM CHLORIDE, PRESERVATIVE FREE 10 ML: 5 INJECTION INTRAVENOUS at 22:00

## 2022-01-27 RX ADMIN — CARBIDOPA AND LEVODOPA 0.5 TABLET: 25; 100 TABLET ORAL at 16:25

## 2022-01-27 RX ADMIN — CARBIDOPA AND LEVODOPA 0.5 TABLET: 25; 100 TABLET ORAL at 20:46

## 2022-01-27 RX ADMIN — BUPROPION HYDROCHLORIDE 300 MG: 300 TABLET, FILM COATED, EXTENDED RELEASE ORAL at 10:02

## 2022-01-27 RX ADMIN — ALPRAZOLAM 0.25 MG: 0.25 TABLET ORAL at 03:58

## 2022-01-27 RX ADMIN — ALPRAZOLAM 0.25 MG: 0.25 TABLET ORAL at 20:40

## 2022-01-27 RX ADMIN — SODIUM CHLORIDE, PRESERVATIVE FREE 10 ML: 5 INJECTION INTRAVENOUS at 14:00

## 2022-01-27 RX ADMIN — HYDROCODONE BITARTRATE AND ACETAMINOPHEN 1 TABLET: 10; 325 TABLET ORAL at 10:33

## 2022-01-27 RX ADMIN — HEPARIN SODIUM 5000 UNITS: 5000 INJECTION, SOLUTION INTRAVENOUS; SUBCUTANEOUS at 23:29

## 2022-01-27 RX ADMIN — Medication 2000 UNITS: at 09:41

## 2022-01-27 RX ADMIN — PIPERACILLIN AND TAZOBACTAM 3.38 G: 3; .375 INJECTION, POWDER, LYOPHILIZED, FOR SOLUTION INTRAVENOUS at 20:52

## 2022-01-27 RX ADMIN — HYDROCODONE BITARTRATE AND ACETAMINOPHEN 1 TABLET: 10; 325 TABLET ORAL at 03:57

## 2022-01-27 RX ADMIN — POTASSIUM CHLORIDE 20 MEQ: 1500 TABLET, EXTENDED RELEASE ORAL at 13:03

## 2022-01-27 NOTE — ED NOTES
Pt medicated per order (see MAR). Pt is AOx4 and able to speak in complete sentences. Pt c/o chronic LBP. Pt walks with a walker at home. Pt given meal tray and has desai in place. Pt has 800 ml of urine that was emptied from desai. PT states that she doesn't remember the last time she had a BM. Pt states that she needs to have a BM. Pt requesting her Norco that she takes daily. This RN educated pt that narcotics can slow GI motility and that can make it difficult to have a BM.

## 2022-01-27 NOTE — PROGRESS NOTES
Problem: Self Care Deficits Care Plan (Adult)  Goal: *Acute Goals and Plan of Care (Insert Text)  Description: Occupational Therapy Goals  Initiated 1/27/2022 within 7 day(s). 1.  Patient will perform grooming with modified independence. 2.  Patient will perform bathing with minimal assistance/contact guard assist using adaptive equipment. 3.  Patient will perform upper body dressing and lower body dressing with minimal assistance/contact guard assist using adaptive equipment. 4.  Patient will perform toilet transfers with supervision/set-up using RW. 5.  Patient will perform all aspects of toileting with supervision/set-up. 6.  Patient will participate in upper extremity therapeutic exercise/activities with supervision/set-up for 8 minutes. 7.  Patient will utilize energy conservation techniques during functional activities with min verbal cues. Prior Level of Function: pt reports her spouse and dtr-in-law assists with ADLs and was Mod I using rollator for functional mobility     Outcome: Progressing Towards Goal   OCCUPATIONAL THERAPY EVALUATION    Patient: Wilfredo Molina (75 y.o. female)  Date: 1/27/2022  Primary Diagnosis: Limited mobility [Z74.09]  Weakness [R53.1]  UTI (urinary tract infection) [N39.0]  Acute diverticulitis [K57.92]  Urinary retention [R33.9]       Precautions:  Fall  PLOF: pt reports her spouse and dtr-in-law assists with ADLs and was Mod I using rollator for functional mobility    ASSESSMENT :  Nursing/RN cleared for pt to participate in OT evaluation and tx session. Patient was seen with PT to maximize patient safety, participation, and functional mobility in preparation for self-care tasks. Patient presents lying semi-reclined on ED stretcher, reports back pain 9/10. LB dress: dep don slipper socks supine in bed, reports spouse assist with TA at PLOF. Bed mobility:Min A x 2 supine <-> sit edge of ED stretcher with additional time.  STS with Min A x 2, pt performing elliot-hygeine with CGA in stance w/ RW. Side stepping using RW towards head of bed/left w/ Min A x 2 with assist to advance RW and additional time. Upon sitting, nursing applied back pain patches. Pt sitting upright in bed in prep to eat meal, call bell within reach & pt verbalized understanding following repetition with education for how to utilize for assist e.g. functional transfers in order to prevent falls. Patient will benefit from skilled intervention to address the above impairments. Patient's rehabilitation potential is considered to be Good  Factors which may influence rehabilitation potential include:   []             None noted  []             Mental ability/status  [x]             Medical condition  []             Home/family situation and support systems  []             Safety awareness  []             Pain tolerance/management  []             Other:      PLAN :  Recommendations and Planned Interventions:   [x]               Self Care Training                  [x]      Therapeutic Activities  [x]               Functional Mobility Training   []      Cognitive Retraining  [x]               Therapeutic Exercises           [x]      Endurance Activities  [x]               Balance Training                    [x]      Neuromuscular Re-Education  []               Visual/Perceptual Training     [x]      Home Safety Training  [x]               Patient Education                   [x]      Family Training/Education  []               Other (comment):    Frequency/Duration: Patient will be followed by occupational therapy 3-5 times a week to address goals. Discharge Recommendations: Skilled Nursing Facility  Further Equipment Recommendations for Discharge: bedside commode and rolling walker     SUBJECTIVE:   Patient stated My  puts my socks on for me. 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    Cancer ([facility] [address]     BREAST, broderick-broderick mastectomies    Constipation     Depression     Diverticular disease     Fibromyalgia     GERD (gastroesophageal reflux disease)     Headache(784.0)     Hearing reduced     hearing loss,ringing in ears    Hypertension     Insomnia     Neurological disorder     headaches,difficulty walking,poor memory    Osteoporosis     Other ill-defined conditions(799.89)     absessed perforated diverticulum    Other ill-defined conditions(799.89)     osteoporosis    Psychiatric disorder     Sleep apnea     CPAP    Vision decreased     poor vision,     Past Surgical History:   Procedure Laterality Date    FOOT/TOES SURGERY PROC UNLISTED      HX BREAST AUGMENTATION  1996    Saline Implants    HX CHOLECYSTECTOMY      HX GI  2012    LOW ANTERIOR RESECTION WITH COLOSTOMY AND LEFT OOPHORECTOMY    HX KNEE ARTHROSCOPY Right     HX MASTECTOMY  1996    bilateral mastectomy    HX ORTHOPAEDIC      RIGHT FOOT AND ANKLE SX    HX ORTHOPAEDIC Left 2017    wrist repair    HX OTHER SURGICAL      multiple sx right leg and foot     Barriers to Learning/Limitations: yes;  cognitive and altered mental status (i.e.Sedation, Confusion)  Compensate with: visual, verbal, tactile, kinesthetic cues/model    Home Situation:   Home Situation  Home Environment: Private residence  # Steps to Enter: 3  Rails to Enter: Yes  One/Two Story Residence: Two story  Lift Chair Available: Yes  Living Alone: No  Support Systems: Child(kerry),Spouse/Significant Other  Current DME Used/Available at Home: Walker, rollator,Walker, rolling,Tub transfer bench,Grab bars  Tub or Shower Type: Tub/Shower combination  [x]  Right hand dominant   []  Left hand dominant    Cognitive/Behavioral Status:  Neurologic State: Alert  Orientation Level: Oriented to person;Oriented to place;Oriented to time  Cognition: Follows commands  Safety/Judgement: Fall prevention    Skin: appears intact  Edema: none noted    Vision/Perceptual:  appears intact  Coordination: BUE  Coordination: Generally decreased, functional  Fine Motor Skills-Upper: Left Intact; Right Intact    Gross Motor Skills-Upper: Left Intact; Right Intact    Balance:  Sitting: Impaired  Sitting - Static: Good (unsupported)  Sitting - Dynamic: Fair (occasional)  Standing: Impaired; With support  Standing - Static: Fair (-)  Standing - Dynamic : Fair (-)    Strength: BUE  Strength: Generally decreased, functional     Tone & Sensation: BUE  Tone: Normal  Sensation: Intact     Range of Motion: BUE  AROM: Within functional limits    Functional Mobility and Transfers for ADLs:  Bed Mobility:     Supine to Sit: Minimum assistance;Assist x2  Sit to Supine: Minimum assistance;Assist x2     Transfers:  Sit to Stand: Minimum assistance;Assist x2  Stand to Sit: Minimum assistance;Assist x2    ADL Assessment:   Feeding: Supervision;Setup    Oral Facial Hygiene/Grooming: Stand-by assistance;Setup    Bathing: Maximum assistance    Upper Body Dressing: Moderate assistance    Lower Body Dressing: Total assistance    Toileting: Contact guard assistance    ADL Intervention:    Lower Body Dressing Assistance  Socks: Total assistance (dependent)  Position Performed: Supine    Toileting  Bladder Hygiene: Contact guard assistance  Bowel Hygiene: Contact guard assistance  Adaptive Equipment: Walker    Cognitive Retraining  Safety/Judgement: Fall prevention    Pain:  Pain level pre-treatment: 9/10 back  Pain level post-treatment: 9/10 back  Pain Intervention(s): Medication (see MAR); Rest,  Repositioning   Response to intervention: Nurse notified, See doc flow    Activity Tolerance:   poor  Please refer to the flowsheet for vital signs taken during this treatment.   After treatment:   [] Patient left in no apparent distress sitting up in chair  [x] Patient left in no apparent distress in bed  [x] Call bell left within reach  [x] Nursing notified  [] Caregiver present  [] Bed alarm activated    COMMUNICATION/EDUCATION:   [x] Role of Occupational Therapy in the acute care setting  [x] Home safety education was provided and the patient/caregiver indicated understanding. [x] Patient/family have participated as able in goal setting and plan of care. [x] Patient/family agree to work toward stated goals and plan of care. [] Patient understands intent and goals of therapy, but is neutral about his/her participation. [] Patient is unable to participate in goal setting and plan of care. Thank you for this referral.  Srinivas Wyman  Time Calculation: 24 mins    Eval Complexity: History: MEDIUM Complexity : Expanded review of history including physical, cognitive and psychosocial  history ; Examination: MEDIUM Complexity : 3-5 performance deficits relating to physical, cognitive , or psychosocial skils that result in activity limitations and / or participation restrictions; Decision Making:MEDIUM Complexity : Patient may present with comorbidities that affect occupational performnce.  Miniml to moderate modification of tasks or assistance (eg, physical or verbal ) with assesment(s) is necessary to enable patient to complete evaluation

## 2022-01-27 NOTE — CONSULTS
An 80years old female patient with medical history of sleep apnea, rheumatoid arthritis, depression, fibromyalgia [takes Percocet for chronic pain] was brought to the emergency room for urinary retention and weakness of the lower extremities. Patient also has worsening lower back pain. But when asked her today, she denied any new lower extremity weakness. Normally walks with a walker. Has been up and was walking with physical therapy today. Denied any lower extremity numbness. Difficulty walking she claims she is from her chronic pain [arthritis]. She difficulty passing urine; currently has a Aly. Also complains of constipation and no bowel movement over the past 4 days. Denied any recent urine color change or pain when passing urine. No upper extremity weakness or numbness. No changes in her speech. No significant changes in her mental status. Had an MRI of the spine [cervical, thoracic, and lumbosacral]. The cervical spine MRI has shown postsurgical changes with intact hardware; mild to moderate spinal canal and neural foraminal narrowing from C4-C7. No reported significant spinal cord compression. Thoracic spine MRI did not show any significant spinal canal stenosis or cord compression. Lumbosacral spine MRI showed multifocal mild to moderate neuroforaminal stenosis; no critical spinal canal stenosis at any level. Patient had a right upper extremity tremor for about a year. Tremor is resting. No involvement of the other extremities; no head tremor. Denied feeling stiff over the arm. No significant changes in her speech or swallowing. No drooling from her mouth. She says she was told that she has Parkinson's disease. Not taking any medications. Denied any significant difficulty with her balance though she walks with a walker.     Social History     Socioeconomic History    Marital status:      Spouse name: Not on file    Number of children: Not on file    Years of education: Not on file    Highest education level: Not on file   Occupational History    Not on file   Tobacco Use    Smoking status: Former Smoker     Quit date: 11/25/2006     Years since quitting: 15.1    Smokeless tobacco: Never Used   Substance and Sexual Activity    Alcohol use: No    Drug use: No    Sexual activity: Not on file   Other Topics Concern    Not on file   Social History Narrative    Not on file     Social Determinants of Health     Financial Resource Strain:     Difficulty of Paying Living Expenses: Not on file   Food Insecurity:     Worried About Running Out of Food in the Last Year: Not on file    Cristian of Food in the Last Year: Not on file   Transportation Needs:     Lack of Transportation (Medical): Not on file    Lack of Transportation (Non-Medical):  Not on file   Physical Activity:     Days of Exercise per Week: Not on file    Minutes of Exercise per Session: Not on file   Stress:     Feeling of Stress : Not on file   Social Connections:     Frequency of Communication with Friends and Family: Not on file    Frequency of Social Gatherings with Friends and Family: Not on file    Attends Mu-ism Services: Not on file    Active Member of [address] [place] [address] or Organizations: Not on file    Attends Club or Organization Meetings: Not on file    Marital Status: Not on file   Intimate Partner Violence:     Fear of Current or Ex-Partner: Not on file    Emotionally Abused: Not on file    Physically Abused: Not on file    Sexually Abused: Not on file   Housing Stability:     Unable to Pay for Housing in the Last Year: Not on file    Number of [place] in the Last Year: Not on file    Unstable Housing in the Last Year: Not on file       Family History   Problem Relation Age of Onset    Hypertension Mother     Headache Mother     Diabetes Father     Hypertension Father     Heart Attack Father         Current Facility-Administered Medications   Medication Dose Route Frequency Provider Last Rate Last Admin    potassium chloride (K-DUR, KLOR-CON M20) SR tablet 20 mEq  20 mEq Oral Q2H Radha Marina MD   20 mEq at 01/27/22 0942    lidocaine 4 % patch 2 Patch  2 Patch TransDERmal Q24H Callie Dunn MD   2 Patch at 01/27/22 0944    carbidopa-levodopa (SINEMET)  mg per tablet 0.5 Tablet  0.5 Tablet Oral TID Alissa WHITEHEAD MD        sodium chloride (NS) flush 5-10 mL  5-10 mL IntraVENous PRN Callie Dunn MD        piperacillin-tazobactam (ZOSYN) 3.375 g in 0.9% sodium chloride (MBP/ADV) 100 mL MBP  3.375 g IntraVENous Q8H Callie Dunn MD 25 mL/hr at 01/27/22 0508 3.375 g at 01/27/22 0508    sodium chloride (NS) flush 5-40 mL  5-40 mL IntraVENous Q8H Callie Dunn MD   10 mL at 01/27/22 0509    sodium chloride (NS) flush 5-40 mL  5-40 mL IntraVENous PRN Callie Dunn MD        acetaminophen (TYLENOL) tablet 650 mg  650 mg Oral Q6H PRN MD Randy Wu Cha acetaminophen (TYLENOL) suppository 650 mg  650 mg Rectal Q6H PRN Callie Dunn MD        polyethylene glycol (MIRALAX) packet 17 g  17 g Oral DAILY PRN Callie Dunn MD        heparin (porcine) injection 5,000 Units  5,000 Units SubCUTAneous Q8H Callie Dunn MD   5,000 Units at 01/27/22 6381    ALPRAZolam [place]) tablet 0.25 mg  0.25 mg Oral BID PRN Callie Dunn MD   0.25 mg at 01/27/22 0358    amLODIPine (NORVASC) tablet 5 mg  5 mg Oral DAILY Callie Dunn MD   5 mg at 01/27/22 6495    atorvastatin (LIPITOR) tablet 10 mg  10 mg Oral DAILY Callie Dunn MD   10 mg at 01/27/22 0942    buPROPion XL (WELLBUTRIN XL) tablet 300 mg  300 mg Oral 7am Callie Dunn MD   300 mg at 01/27/22 1002    cholecalciferol (VITAMIN D3) (1000 Units /25 mcg) tablet 2,000 Units  2,000 Units Oral DAILY Callie Dunn MD   2,000 Units at 01/27/22 0941    HYDROcodone-acetaminophen (NORCO)  mg tablet 1 Tablet  1 Tablet Oral Q6H PRN Margret Yu MD   1 Tablet at 01/27/22 1033    traZODone (DESYREL) tablet 100 mg  100 mg Oral QHS Margret Yu MD         Current Outpatient Medications   Medication Sig Dispense Refill    cholecalciferol (VITAMIN D3) 1,000 unit cap Take 2,000 Units by mouth daily.  HYDROcodone-acetaminophen (NORCO)  mg tablet hydrocodone 10 mg-acetaminophen 325 mg tablet      amLODIPine (NORVASC) 5 mg tablet Take 5 mg by mouth daily.  buPROPion XL (WELLBUTRIN XL) 150 mg tablet Take 300 mg by mouth every morning.  ALPRAZolam (XANAX) 1 mg tablet Take 0.25 mg by mouth two (2) times daily as needed.  traZODone (DESYREL) 50 mg tablet Take 100 mg by mouth nightly. Indications: difficulty sleeping      oxyCODONE-acetaminophen (PERCOCET) 7.5-325 mg per tablet Take  by mouth. (Patient not taking: Reported on 1/26/2022)      brexpiprazole (REXULTI) 1 mg tab tablet Take  by mouth daily. (Patient not taking: Reported on 1/26/2022)      tiZANidine (ZANAFLEX) 2 mg capsule Take 2 mg by mouth three (3) times daily. (Patient not taking: Reported on 1/26/2022)      docusate calcium (SURFAK) 240 mg capsule Take 240 mg by mouth two (2) times a day. (Patient not taking: Reported on 1/26/2022)      esomeprazole (NEXIUM) 20 mg capsule Take 20 mg by mouth daily. (Patient not taking: Reported on 1/26/2022)      oxyMORphone 5 mg PO ER tablet Take one tablet at bedtime for chronic, severe pain (Patient not taking: Reported on 1/26/2022) 30 Tab 0    senna-docusate (PERICOLACE) 8.6-50 mg per tablet Take 1 Tab by mouth daily. (Patient not taking: Reported on 1/26/2022) 60 Tab 0    triamcinolone acetonide (KENALOG) 0.1 % ointment Apply  to affected area. use thin layer (Patient not taking: Reported on 1/26/2022)      promethazine (PHENERGAN) 25 mg tablet Take 1 Tab by mouth every six (6) hours as needed for Nausea.  (Patient not taking: Reported on 1/26/2022) 30 Tab 0    polyethylene glycol (MIRALAX) 17 gram packet Take 1 Packet by mouth daily. (Patient not taking: Reported on 1/26/2022) 10 Packet 1    Calcium-Cholecalciferol, D3, 600 mg(1,500mg) -400 unit cap Take  by mouth. (Patient not taking: Reported on 1/26/2022)      atorvastatin (LIPITOR) 10 mg tablet Take  by mouth daily.  lurasidone (LATUDA) 20 mg tab tablet Take 40 mg by mouth. (Patient not taking: Reported on 1/26/2022)      Denosumab (PROLIA) 60 mg/mL injection 60 mg by SubCUTAneous route.  ondansetron (ZOFRAN ODT) 4 mg disintegrating tablet Take 4 mg by mouth every eight (8) hours as needed for Nausea. Indications: ACUTE GASTROENTERITIS-RELATED VOMITING IN PEDIATRICS (Patient not taking: Reported on 1/26/2022)      diltiazem hcl (CARDIZEM) 120 mg tablet Take 120 mg by mouth daily.          Past Medical History:   Diagnosis Date    Arrhythmia     hx PVCs    Arthritis     RHEUMATOID  and Osteo arthritis    Balance disorder 12/2016    Cancer ([facility] [address]     BREAST, broderick-broderick mastectomies    Constipation     Depression     Diverticular disease     Fibromyalgia     GERD (gastroesophageal reflux disease)     Headache(784.0)     Hearing reduced     hearing loss,ringing in ears    Hypertension     Insomnia     Neurological disorder     headaches,difficulty walking,poor memory    Osteoporosis     Other ill-defined conditions(799.89)     absessed perforated diverticulum    Other ill-defined conditions(799.89)     osteoporosis    Psychiatric disorder     Sleep apnea     CPAP    Vision decreased     poor vision,       Past Surgical History:   Procedure Laterality Date    FOOT/TOES SURGERY PROC UNLISTED      HX BREAST AUGMENTATION  1996    Saline Implants    HX CHOLECYSTECTOMY      HX GI  2012    LOW ANTERIOR RESECTION WITH COLOSTOMY AND LEFT OOPHORECTOMY    HX KNEE ARTHROSCOPY Right     HX MASTECTOMY  1996    bilateral mastectomy    HX ORTHOPAEDIC      RIGHT FOOT AND ANKLE SX    HX ORTHOPAEDIC Left 2017    wrist repair    HX OTHER SURGICAL      multiple sx right leg and foot       Allergies   Allergen Reactions    Latuda [Lurasidone] Other (comments)     Memory loss    Neurontin [Gabapentin] Other (comments)     PATIENT STATES SHE IS NOT ALLERGIC       Patient Active Problem List   Diagnosis Code    History of colon polyps Z86.010    Rheumatoid arthritis ([facility] [address] M06.9    Osteoarthritis M19.90    Pain in limb M79.609    Myalgia M79.10    GERD (gastroesophageal reflux disease) K21.9    Therapeutic opioid induced constipation K59.03, T40.2X5A    Osteoporosis M81.0    Episodic tension-type headache, not intractable G44.219    Migraine G43.909    Hx of breast cancer Z85.3    Chronic pain syndrome G89.4    Insomnia secondary to chronic pain G89.29, G47.01    Encounter for long-term (current) use of other medications Z79.899    Pain in joint, multiple sites M25.50    Nausea R11.0    Primary localized osteoarthrosis, lower leg M17.10    Urinary retention R33.9    UTI (urinary tract infection) N39.0    Weakness R53.1    Acute diverticulitis K57.92    Limited mobility Z74.09         Review of Systems:   As above otherwise 11 point review of systems negative including;   Constitutional no fever or chills  Skin denies rash or itching  HENT  Denies tinnitus, hearing lose  Eyes denies diplopia vision lose  Respiratory denies shortness of breath  Cardiovascular denies chest pain, dyspnea on exertion  Gastrointestinal denies nausea, vomiting, diarrhea, constipation  Genitourinary denies incontinence  Musculoskeletal denies joint pain or swelling  Endocrine denies weight change  Hematology denies easy bruising or bleeding   Neurological as above in HPI      PHYSICAL EXAMINATION:      VITAL SIGNS:    Visit Vitals  /65   Pulse (!) 106   Temp 98.1 °F (36.7 °C)   Resp 24   Ht 5' 3\" (1.6 m)   SpO2 94%   BMI 27.81 kg/m²       GENERAL: In no apparent distress.    EXTREMITIES: Increased tone over the right upper extremity  HEAD:   The patient is normocephalic. NEUROLOGIC EXAMINATION  Mental status: Awake, alert, oriented x3, follows simple, complex and inverted commands, no neglect, no extinction  Speech and languge: fluent, coherent, and comprehension intact  CN: VFF, EOMI, PERRLA, face sensation intact , no facial asymmetry noted, palate elevation symmetric bilat, SS+SCM 5/5 bilat, tongue midline  Motor: no pronator drift, decreased tone over the right upper extremity with cogwheeling; moves the upper or lower extremities symmetrically. Strength is over the upper extremities is 5/5; lower extremity strength is is limited by pain but symmetric. Sensory: intact to light touch throughout  Coordination: FNF  accurate w/o dysmetria  DTR: 3+ throughout, toes downgoing BL; negative Olson. Gait: not tested          Study Result    Narrative & Impression   EXAMINATION:     MRI cervical spine with and without contrast  MRI thoracic spine within without contrast  MRI lumbar spine with and without contrast     INDICATION: Lumbar pain, urinary retention     COMPARISON: MRI lumbar 7/9/2011     TECHNIQUE: Sagittal and axial multiecho MRI sequences of the cervical spine,  thoracic spine, and lumbar spine performed before and after IV gadolinium  contrast.     FINDINGS:     MRI CERVICAL SPINE:     Postoperative: Posterior hardware fixation right C2-C3, and left C3-C4 through  suboccipital. Hardware artifact limits evaluation. No obvious postoperative  fluid collection.     General: Motion artifact limits evaluation. Vertebral body heights preserved. Multilevel mild disc space loss and mild endplate spurring. C4-C5 and C5-C6  trace anterolisthesis. Cervical lordosis maintained. No obvious cord signal  abnormality identified within exam limits due to motion artifact. No definite  epidural collection identified.  No suspicious marrow signal identified.     Miscellaneous: No incidental significant findings identified outside of the  cervical spine region.     Levels:     Craniocervical junction: No significant degenerative change or stenosis.     C2-C3: Postoperative level. Tiny posterior central disc protrusion. Mild right  uncovertebral spurring. Facets unremarkable with mild ligamentum flavum bulge. Spinal canal patent but mild impression upon the dorsal cord. Foramina largely  patent.     C3-C4: Postoperative level: Artifact limits evaluation. Minimal disc bulge and  minimal uncovertebral spurring. No significant facet hypertrophy identified. Spinal canal patent. Foramina not well evaluated, but likely at least mild  foraminal stenoses.     C4-C5: Trace anterolisthesis. Minimal disc bulge. Facets not well evaluated due  to hardware artifact, but likely at least moderate left greater than right facet  arthropathy with mild ligamentum flavum bulge. Mild to moderate spinal canal  stenosis, most pronounced in the AP plane. Foramina not well evaluated, but  likely mild to moderate foraminal stenoses.     C5-C6: Trace anterolisthesis with mild disc bulge. Moderate left and mild right  facet arthropathy with mild ligamentum flavum bulge. Mild to moderate spinal  canal stenosis, most pronounced in the AP plane. Mild to moderate foraminal  stenoses.     C6-C7: Mild disc/osteophyte bulge. Mild to moderate left greater and right facet  arthropathy. Mild to moderate spinal canal stenosis with impression upon the  ventral cord. Mild right foraminal stenosis.     C7-T1: No significant disc herniation. Mild left facet arthropathy. Spinal canal  and foramina patent.     MRI THORACIC SPINE:     General: Motion artifact limits evaluation. Vertebral body heights preserved. Multilevel mild disc space loss and mild endplate spurring. No significant  listhesis. Thoracic kyphosis maintained. No definite cord signal abnormality  identified. No abnormal epidural collection identified.  T10 anterior body ovoid  well-circumscribed enhancing focus.     Miscellaneous: Left lung base somewhat rounded subpleural signal abnormality,  better evaluated on same-day CT abdomen.     Levels:  -No more minimal disc herniation at any level. -Multilevel mild facet arthropathy and mild ligamentum flavum bulge.  -No significant spinal canal stenosis at any level. -Right T5-T6, right T6-7 likely mild to moderate foraminal stenoses.     MRI LUMBAR SPINE:     General: Motion limits evaluation. Dextrocurvature apex L3. Vertebral body  heights preserved. Multilevel disc space loss and mild endplate spurring, most  notable at L1-L2 and L4-L5. No significant AP listhesis. Lumbar lordosis  maintained. Conus ends at level L2. No abnormal signal or enhancement identified  in the distal cord. No abnormal epidural collection identified. No suspicious  marrow signal.     Miscellaneous: Bilateral renal cysts. Prominent common bile duct in the setting  of reported cholecystectomy, likely reservoir effect. Colonic diverticulosis  noted.     Levels:     T12-L1: No significant degenerative change or stenosis.     L1-L2: Mild disc bulge. Mild facet arthropathy. Minimal spinal canal narrowing  with minimal abutment of the crossing L2 nerves. Mild to moderate left greater  and right foraminal stenoses.     L2-L3: Left foraminal focal disc protrusion with background mild disc bulge. Mild facet arthropathy with mild ligamentum flavum bulge. Minimal spinal canal  narrowing with mild abutment of the crossing left L3 nerve. Mild left foraminal  stenosis.     L3-L4: Mild disc/osteophyte bulge. Mild facet arthropathy. Minimal spinal canal  narrowing. Mild left foraminal stenosis.     L4-L5: Mild disc/osteophyte bulge. Mild facet arthropathy. Minimal spinal canal  narrowing. Mild foraminal stenoses with mild abutment of the exiting left L4  nerve.     L5-S1: Mild eccentric to the right disc bulge. Moderate right greater than left  facet arthropathy. Spinal canal patent.  Mild to moderate right foraminal  stenosis with mild abutment of the exiting right L5 nerve.     Imaged sacrum: Perineural cysts at S2 level, causing chronic appearing bony  remodeling, and additional possible chronic fracture deformity at sacrum at  S2-S3 level.     IMPRESSION     Cervical spine:  -Motion artifact significantly limits evaluation.  -Suboccipital through C4 posterior hardware fixation as above. Associated  artifact further limits evaluation.  -Mild to moderate spinal canal and foraminal stenoses at C4-C7 levels. Multilevel mild disc herniations. Left greater than right notable facet  arthropathy at C4-C7 levels.     Thoracic spine:  -No critical spinal canal stenosis at any level. Mild to moderate foraminal  stenoses at T5-T6 and T6-T7.  -Anterior T10 body ovoid enhancing focus, indeterminate, possibly atypical  hemangioma, without definite correlate on CT. Consider follow-up thoracic spine  MRI in 3-6 months to reassess, if warranted.     Lumbar spine:  -No critical spinal canal stenosis at any level. Multifocal mild to moderate  foraminal stenoses, most notable at L1-L2 and L5-S1.  -Dextrocurvature apex L3.  Multilevel mild disc herniations and mild to moderate  facet arthropathy.  -Incidental findings as above.             CBC:   Lab Results   Component Value Date/Time    WBC 4.5 (L) 01/27/2022 03:26 AM    RBC 4.61 01/27/2022 03:26 AM    HGB 14.5 01/27/2022 03:26 AM    HCT 43.8 01/27/2022 03:26 AM    PLATELET 602 19/01/2723 03:26 AM     BMP:   Lab Results   Component Value Date/Time    Glucose 104 (H) 01/27/2022 03:26 AM    Sodium 136 01/27/2022 03:26 AM    Potassium 3.1 (L) 01/27/2022 03:26 AM    Chloride 101 01/27/2022 03:26 AM    CO2 29 01/27/2022 03:26 AM    BUN 18 01/27/2022 03:26 AM    Creatinine 0.90 01/27/2022 03:26 AM    Calcium 9.5 01/27/2022 03:26 AM     CMP:   Lab Results   Component Value Date/Time    Glucose 104 (H) 01/27/2022 03:26 AM    Sodium 136 01/27/2022 03:26 AM    Potassium 3.1 (L) 01/27/2022 03:26 AM    Chloride 101 01/27/2022 03:26 AM    CO2 29 01/27/2022 03:26 AM    BUN 18 01/27/2022 03:26 AM    Creatinine 0.90 01/27/2022 03:26 AM    Calcium 9.5 01/27/2022 03:26 AM    Anion gap 6 01/27/2022 03:26 AM    BUN/Creatinine ratio 20 01/27/2022 03:26 AM    Alk. phosphatase 79 01/26/2022 11:50 AM    Protein, total 7.5 01/26/2022 11:50 AM    Albumin 3.9 01/26/2022 11:50 AM    Globulin 3.6 01/26/2022 11:50 AM    A-G Ratio 1.1 01/26/2022 11:50 AM     Coagulation:   Lab Results   Component Value Date/Time    Prothrombin time 22.6 (H) 12/05/2014 02:16 AM    INR 2.0 (H) 12/05/2014 02:16 AM    aPTT 28.8 11/22/2014 11:34 AM       Impression: An 80years old male patient with above medical problems including resting tremor and chronic pain on Percocet was brought to the emergency room for worsening lower back pain, difficulty walking [lower extremity weakness] and urinary retention. Had a Aly catheter; clear urine. Also complains of constipation for the past 4 days. Had previous history of confusion possibly from poor chronic narcotic pain medication use. Reviewed her MRI of the cervical, thoracic, and lumbosacral spines. It has shown mild to moderate degenerative changes with multifocal neural foraminal narrowing; no significant spinal cord compression. No evidence from the lumbosacral spine for possible cauda equina or conus syndrome. Patient at this time denied any new lower extremity weakness. The gait difficulty is patient could be multifactorial: The chronic pain/lower back pain. In addition, has been having resting tremor for the past 1 year. On exam there is cogwheel rigidity over the right upper extremity. Most likely has idiopathic Parkinson's disease. Might benefit from low-dose Sinemet 25/100, half tab p.o. daily. Her urinalysis has shown some signs of UTI. Her Covid test is positive.     Plan:   -Start her on Sinemet, 25/100: Half tab p.o. 3 times daily  -Treatment of infection and pain per the primary team  -Call for questions  -We will follow patient as needed.  -Follow-up at the outpatient neurology clinic for her Parkinson's disease. PLEASE NOTE:   This document has been produced using voice recognition software. Unrecognized errors in transcription may be present.

## 2022-01-27 NOTE — PROGRESS NOTES
completed the initial Spiritual Assessment of the patient in bed FT1 and offered Pastoral Care support to the patient who was here due to possible covid infection which is yet to be determined. There is no advance directive for this patient. . Patient does not have any Congregational/cultural needs that will affect patients preferences in health care. Chaplains will continue to follow and will provide pastoral care on an as needed/requested basis.     [facility] Care Department  [phone]

## 2022-01-27 NOTE — ED NOTES
Medications given per orders. Patient NAD, skin warm and dry. Aly draining without problems. Respirations even and unlabored. Lights off as requested.

## 2022-01-27 NOTE — PROGRESS NOTES
Intern Progress Note  [address] [facility]       Patient: Harjit Marrero MRN: [id number]  CSN: [id number]    YOB: 1940  Age: 80 y.o. Sex: female    DOA: 1/26/2022 LOS:  LOS: 1 day                    Subjective:      Patient seen and examined at bedside this morning. She is awake and alert. She does not have any particular concerns this morning. She states that she overall feels stronger than yesterday. Denies chest pain, shortness of breath, abdominal pain, dysuria. She notes that she is able to move her legs this morning, which is improved since yesterday. Denies fever/chills, n/v. She does have constipation and has not had a bowel movement since since arrival to the ED. Review of Systems   Constitutional: Negative for chills, fever and malaise/fatigue. Respiratory: Negative for shortness of breath. Cardiovascular: Negative for chest pain and leg swelling. Gastrointestinal: Negative for abdominal pain and nausea. Genitourinary: Negative for dysuria. Skin: Negative for rash. Neurological: Negative for headaches. Objective:     Patient Vitals for the past 24 hrs:   Temp Pulse Resp BP SpO2   01/27/22 0405 98.1 °F (36.7 °C) 88 16 133/88 94 %   01/27/22 0011 100 °F (37.8 °C) 91 16 136/85 94 %   01/26/22 1103 -- -- -- -- 98 %   01/26/22 1053 (!) 100.6 °F (38.1 °C) 85 16 (!) 150/77 98 %         Intake/Output Summary (Last 24 hours) at 1/27/2022 0849  Last data filed at 1/27/2022 0350  Gross per 24 hour   Intake 100 ml   Output --   Net 100 ml       Physical Exam:   General:  No acute distress, comfortable-appearing and awake. HEENT: Conjunctiva pink, sclera anicteric. EOMI. CV:  RRR, no M/G/R appreciated. No visible pulsations or thrills. RESP:  Unlabored breathing. Lungs CTAB, no wheezes, rales or rhonchi appreciated. Equal expansion bilaterally. ABD:  Soft, nondistended, general tenderness to palpation (patient unable to identify location of pain).  BS (+).  No guarding. MS: Point tenderness to general palpation of cervical and thoracic spine. No tenderness to palpation of b/l hips. Neuro:   5/5 strength bilateral upper extremities 4/5 bilateral lower extremities with R >L. Ext:  No edema. 2+ radial pulses bilaterally. Skin:  No rashes, lesions, or ulcers appreciated  Psych: normal mood and affect     Lab/Data Reviewed:  Recent Results (from the past 24 hour(s))   CULTURE, BLOOD    Collection Time: 01/26/22 11:50 AM    Specimen: Blood   Result Value Ref Range    Special Requests: NO SPECIAL REQUESTS      Culture result: NO GROWTH AFTER 18 HOURS     METABOLIC PANEL, COMPREHENSIVE    Collection Time: 01/26/22 11:50 AM   Result Value Ref Range    Sodium 134 (L) 136 - 145 mmol/L    Potassium 3.9 3.5 - 5.5 mmol/L    Chloride 98 (L) 100 - 111 mmol/L    CO2 30 21 - 32 mmol/L    Anion gap 6 3.0 - 18 mmol/L    Glucose 126 (H) 74 - 99 mg/dL    BUN 21 (H) 7.0 - 18 MG/DL    Creatinine 0.96 0.6 - 1.3 MG/DL    BUN/Creatinine ratio 22 (H) 12 - 20      GFR est AA >60 >60 ml/min/1.73m2    GFR est non-AA 56 (L) >60 ml/min/1.73m2    Calcium 9.9 8.5 - 10.1 MG/DL    Bilirubin, total 0.7 0.2 - 1.0 MG/DL    ALT (SGPT) 26 13 - 56 U/L    AST (SGOT) 13 10 - 38 U/L    Alk.  phosphatase 79 45 - 117 U/L    Protein, total 7.5 6.4 - 8.2 g/dL    Albumin 3.9 3.4 - 5.0 g/dL    Globulin 3.6 2.0 - 4.0 g/dL    A-G Ratio 1.1 0.8 - 1.7     CBC WITH AUTOMATED DIFF    Collection Time: 01/26/22 11:50 AM   Result Value Ref Range    WBC 5.4 4.6 - 13.2 K/uL    RBC 4.72 4.20 - 5.30 M/uL    HGB 14.8 12.0 - 16.0 g/dL    HCT 45.2 (H) 35.0 - 45.0 %    MCV 95.8 78.0 - 100.0 FL    MCH 31.4 24.0 - 34.0 PG    MCHC 32.7 31.0 - 37.0 g/dL    RDW 13.1 11.6 - 14.5 %    PLATELET 886 789 - 848 K/uL    MPV 10.2 9.2 - 11.8 FL    NRBC 0.0 0  WBC    ABSOLUTE NRBC 0.00 0.00 - 0.01 K/uL    NEUTROPHILS 83 (H) 40 - 73 %    LYMPHOCYTES 5 (L) 21 - 52 %    MONOCYTES 11 (H) 3 - 10 %    EOSINOPHILS 0 0 - 5 %    BASOPHILS 1 0 - 2 %    IMMATURE GRANULOCYTES 0 0.0 - 0.5 %    ABS. NEUTROPHILS 4.5 1.8 - 8.0 K/UL    ABS. LYMPHOCYTES 0.3 (L) 0.9 - 3.6 K/UL    ABS. MONOCYTES 0.6 0.05 - 1.2 K/UL    ABS. EOSINOPHILS 0.0 0.0 - 0.4 K/UL    ABS. BASOPHILS 0.0 0.0 - 0.1 K/UL    ABS. IMM.  GRANS. 0.0 0.00 - 0.04 K/UL    DF AUTOMATED     POC LACTIC ACID    Collection Time: 01/26/22 11:57 AM   Result Value Ref Range    Lactic Acid (POC) 1.04 0.40 - 2.00 mmol/L   CULTURE, BLOOD    Collection Time: 01/26/22 12:20 PM    Specimen: Blood   Result Value Ref Range    Special Requests: NO SPECIAL REQUESTS      Culture result: NO GROWTH AFTER 11 HOURS     EKG, 12 LEAD, INITIAL    Collection Time: 01/26/22  4:41 PM   Result Value Ref Range    Ventricular Rate 94 BPM    Atrial Rate 94 BPM    P-R Interval 198 ms    QRS Duration 80 ms    Q-T Interval 336 ms    QTC Calculation (Bezet) 420 ms    Calculated P Axis 66 degrees    Calculated R Axis 21 degrees    Calculated T Axis 54 degrees    Diagnosis       Normal sinus rhythm  Nonspecific T wave abnormality  Abnormal ECG  When compared with ECG of 31-JAN-2017 15:51,  premature ventricular complexes are no longer present  Criteria for Septal infarct are no longer present     URINALYSIS W/ RFLX MICROSCOPIC    Collection Time: 01/26/22  7:15 PM   Result Value Ref Range    Color YELLOW      Appearance CLEAR      Specific gravity 1.013 1.005 - 1.030      pH (UA) 6.5 5.0 - 8.0      Protein Negative NEG mg/dL    Glucose Negative NEG mg/dL    Ketone Negative NEG mg/dL    Bilirubin Negative NEG      Blood SMALL (A) NEG      Urobilinogen 0.2 0.2 - 1.0 EU/dL    Nitrites Positive (A) NEG      Leukocyte Esterase Negative NEG     URINE MICROSCOPIC ONLY    Collection Time: 01/26/22  7:15 PM   Result Value Ref Range    WBC 0 to 3 0 - 4 /hpf    RBC 0 to 3 0 - 5 /hpf    Epithelial cells FEW 0 - 5 /lpf    Bacteria 2+ (A) NEG /hpf   METABOLIC PANEL, BASIC    Collection Time: 01/27/22  3:26 AM   Result Value Ref Range    Sodium 136 136 - 145 mmol/L    Potassium 3.1 (L) 3.5 - 5.5 mmol/L    Chloride 101 100 - 111 mmol/L    CO2 29 21 - 32 mmol/L    Anion gap 6 3.0 - 18 mmol/L    Glucose 104 (H) 74 - 99 mg/dL    BUN 18 7.0 - 18 MG/DL    Creatinine 0.90 0.6 - 1.3 MG/DL    BUN/Creatinine ratio 20 12 - 20      GFR est AA >60 >60 ml/min/1.73m2    GFR est non-AA >60 >60 ml/min/1.73m2    Calcium 9.5 8.5 - 10.1 MG/DL   CBC WITH AUTOMATED DIFF    Collection Time: 01/27/22  3:26 AM   Result Value Ref Range    WBC 4.5 (L) 4.6 - 13.2 K/uL    RBC 4.61 4.20 - 5.30 M/uL    HGB 14.5 12.0 - 16.0 g/dL    HCT 43.8 35.0 - 45.0 %    MCV 95.0 78.0 - 100.0 FL    MCH 31.5 24.0 - 34.0 PG    MCHC 33.1 31.0 - 37.0 g/dL    RDW 13.1 11.6 - 14.5 %    PLATELET 187 167 - 022 K/uL    MPV 10.7 9.2 - 11.8 FL    NRBC 0.0 0  WBC    ABSOLUTE NRBC 0.00 0.00 - 0.01 K/uL    NEUTROPHILS 61 40 - 73 %    LYMPHOCYTES 18 (L) 21 - 52 %    MONOCYTES 20 (H) 3 - 10 %    EOSINOPHILS 0 0 - 5 %    BASOPHILS 0 0 - 2 %    IMMATURE GRANULOCYTES 0 0.0 - 0.5 %    ABS. NEUTROPHILS 2.8 1.8 - 8.0 K/UL    ABS. LYMPHOCYTES 0.8 (L) 0.9 - 3.6 K/UL    ABS. MONOCYTES 0.9 0.05 - 1.2 K/UL    ABS. EOSINOPHILS 0.0 0.0 - 0.4 K/UL    ABS. BASOPHILS 0.0 0.0 - 0.1 K/UL    ABS. IMM. GRANS. 0.0 0.00 - 0.04 K/UL    DF AUTOMATED     MAGNESIUM    Collection Time: 01/27/22  3:26 AM   Result Value Ref Range    Magnesium 2.4 1.6 - 2.6 mg/dL       Assessment & Plan:     80 y.o. female with PMH depression, anxiety, diverticulitis, osteoarthritis, HLD, HTN, GERD, neurofibromatosis, chronic pain, now admitted for UTI and lower extremity weakness.     Urinary Retention, LE weakness   - Patient presents with urinary retention in the setting of worsening bilateral hip/lower back pain, LE weakness and worsening mobility. Patient's  reported to ED that patient complained of urinary retention morning of 1/26. Bladder scan in ED revealed ~500ml urine retained and a desai was subsequently placed.  Patient's  reported to ED that when patient tried to stand from chair on 1/26, she slowly slid to floor. Patient denied urinary or fecal incontinence or saddle anesthesia. On exam, no point tenderness to lower back, strength 4/5 bilateral LE with R stronger than L.  ED reported patient unable to bear weight initially. - Patient has history of neurofibromatosis. Last seen by neurosurgery 10/21 noted stable cervical spine MRI with known nerve sheath tumors around cervical cord with no active compression or cord signal change. 10/21 visit also noted that patient reported gait imbalance and using a wheelchair. Patient currently reporting using a walker to ambulate at home. - Feverish (100.6F) patient with no lower back point tenderness and no known history of IV drug use, low suspicion for spinal abscess. - MRI 1/26: Cervical spine: Motion artifact significantly limits evaluation. Suboccipital through C4 posterior hardware fixation as above. Associated artifact further limits evaluation. Mild to moderate spinal canal and foraminal stenoses at C4-C7 levels. Multilevel mild disc herniations. Left greater than right notable facet  arthropathy at C4-C7 levels. Thoracic spine: No critical spinal canal stenosis at any level. Mild to moderate foraminal stenoses at T5-T6 and T6-T7. Anterior T10 body ovoid enhancing focus, indeterminate, possibly atypical hemangioma, without definite correlate on CT. Consider follow-up thoracic spine MRI in 3-6 months to reassess, if warranted. Lumbar spine: No critical spinal canal stenosis at any level. Multifocal mild to moderate foraminal stenoses, most notable at L1-L2 and L5-S1. Dextrocurvature apex L3. Multilevel mild disc herniations and mild to moderate facet arthropathy.   PLAN:  - PT/OT for strengthening exercises  - Neurology will see today, appreciate assistance  - Strength does appear to have improve, patient states at her baseline  - Continue desai for urinary retention and voiding trial prior to discharge  - Daily CBC, CMP, Mg, Phos     UTI w/ urinary retention  Patient febrile to 100.6 on arrival.  WBC wnl. Did not meet sepsis criteria. UA positive for small blood, nitrites, and 2+ bacteria. Urine culture sent. Zosyn started. General tenderness to abdominal palpation on exam.  - continue zosyn   - FU urine culture  - FU blood cultures x 2     Diverticulitis  Patient has known history of diverticulitis, CT abdomen/pelvis 1/25 revealed scattered diverticula at colon consistent with mild diverticulitis. Patient febrile to 100.6 with general tenderness to abdominal palpation. Patient unsure of date of last BM. Previously started on Cipro and Flagyl in ED (however, Flagyl is currently on national back order per pharm). PLAN:  - miralax PRN  - continue zosyn      COVID rule out  Elderly patient presenting with isolated fever. Cause for fever multifactorial including ?urosepsis from underlying UTI vs diverticulitis, vs COVID. Patient COVID vaccinated x2 last dose 3/10, due for booster dose. PLAN:  - FU respiratory virus panel     Chronic Pain, DJD, multiple joints  Patient complaining of increased lower back pain. On exam no point tenderness of lumbar spine, but signifcant tenderness to general palpation of bilateral hips and L foot with no significant MSK abnormalities on exam.  CT abdomen/pelvis did not note any acute abnormalities at skeleton. Medications per Dr Ang Portillo note 11/2021 report norco and meloxicam.  Patient reported taking norco for pain.   PLAN:  - continue norco  q6h PRN  - lidocaine patches PRN     Depression/Anxiety  - home bupropion XL 300mg daily  - home trazodone 100mg daily at bedtime  - home xanax 0.25mg BID PRN     HTN - BP elevated to 150/77 on arrival.  - home amlodipine 5mg daily  - monitor BP per unit protocol     Hyperlipidemia  - home atorvastatin 10mg     Global Care:  - VS per unit routine  - supplemental O2 for sats < 92%  - incentive spirometer  - PT/OT/CM     Diet  Adult regular   DVT Prophylaxis  SQH   GI Prophylaxis   -    Code status  DNR   Disposition  TBD      Point of Contact Gerline Blind  Relationship:   [phone]           Jocelyn Sylvester DO , PGY-1   [facility]   January 27, 2022, 8:49 AM

## 2022-01-27 NOTE — PROGRESS NOTES
*ATTENTION:  This note has been created by a medical student for educational purposes only. Please do not refer to the content of this note for clinical decision-making, billing, or other purposes. Please see attending physicians note to obtain clinical information on this patient. *         Medical Student Progress Note  [facility] \[facility] [place] [facility]\"" Medicine        **Medical Student Note for Educational Purposes Only**       Patient: Vy Pemberton MRN: [id number]  CSN: [id number]    YOB: 1940  Age: 80 y.o. Sex: female    DOA: 1/26/2022 LOS:  LOS: 1 day                    Subjective:     Acute events: NAEO    Patient notes that she has had a scratchy sore throat since yesterday and continues to have a slight productive cough of a few days. She has been feeling less weak since last night. Patient feels her pain is controlled and has a good appetite. She is tolerating her medication well. She is quite preoccupied at the moment not being able to rememeber her second new antidepressant medication. Review of Systems   Constitutional: Negative. HENT: Positive for sore throat. Negative for congestion. Eyes: Negative. Respiratory: Positive for cough. Negative for shortness of breath and wheezing. Cardiovascular: Negative. Gastrointestinal: Positive for constipation. Negative for abdominal pain, nausea and vomiting. Musculoskeletal: Positive for back pain and myalgias. Neurological: Positive for tremors. Negative for dizziness, speech change, loss of consciousness, weakness and headaches. Psychiatric/Behavioral: Negative. All other systems reviewed and are negative.               Objective:      Patient Vitals for the past 24 hrs:   Temp Pulse Resp BP SpO2   01/27/22 0405 98.1 °F (36.7 °C) 88 16 133/88 94 %   01/27/22 0011 100 °F (37.8 °C) 91 16 136/85 94 %   01/26/22 1103 -- -- -- -- 98 %   01/26/22 1053 (!) 100.6 °F (38.1 °C) 85 16 (!) 150/77 98 %         Intake/Output Summary (Last 24 hours) at 1/27/2022 0725  Last data filed at 1/27/2022 0350  Gross per 24 hour   Intake 100 ml   Output --   Net 100 ml       Physical Exam:     General:  Alert and Responsive and in No acute distress. CV:  Normal heart rate with regular rhythm. No murmurs, rubs, or gallops. RESP:  Unlabored breathing. Lungs clear to auscultation. No wheezes, rales, or rhonchi. Equal expansion bilaterally. No increased tactile fremitus  ABD:  Soft, nontender, nondistended. No hepatosplenomegaly. No suprapubic tenderness. MS:  No joint deformity or instability. No atrophy. No point tenderness on hips. Neuro:  4/5 strength bilateral upper extremities and lower extremities. Ext:  No edema. Skin:  No rashes, lesions, or ulcers. Lab/Data Reviewed:  Recent Results (from the past 24 hour(s))   CULTURE, BLOOD    Collection Time: 01/26/22 11:50 AM    Specimen: Blood   Result Value Ref Range    Special Requests: NO SPECIAL REQUESTS      Culture result: NO GROWTH AFTER 18 HOURS     METABOLIC PANEL, COMPREHENSIVE    Collection Time: 01/26/22 11:50 AM   Result Value Ref Range    Sodium 134 (L) 136 - 145 mmol/L    Potassium 3.9 3.5 - 5.5 mmol/L    Chloride 98 (L) 100 - 111 mmol/L    CO2 30 21 - 32 mmol/L    Anion gap 6 3.0 - 18 mmol/L    Glucose 126 (H) 74 - 99 mg/dL    BUN 21 (H) 7.0 - 18 MG/DL    Creatinine 0.96 0.6 - 1.3 MG/DL    BUN/Creatinine ratio 22 (H) 12 - 20      GFR est AA >60 >60 ml/min/1.73m2    GFR est non-AA 56 (L) >60 ml/min/1.73m2    Calcium 9.9 8.5 - 10.1 MG/DL    Bilirubin, total 0.7 0.2 - 1.0 MG/DL    ALT (SGPT) 26 13 - 56 U/L    AST (SGOT) 13 10 - 38 U/L    Alk.  phosphatase 79 45 - 117 U/L    Protein, total 7.5 6.4 - 8.2 g/dL    Albumin 3.9 3.4 - 5.0 g/dL    Globulin 3.6 2.0 - 4.0 g/dL    A-G Ratio 1.1 0.8 - 1.7     CBC WITH AUTOMATED DIFF    Collection Time: 01/26/22 11:50 AM   Result Value Ref Range    WBC 5.4 4.6 - 13.2 K/uL    RBC 4.72 4.20 - 5.30 M/uL    HGB 14.8 12.0 - 16.0 g/dL    HCT 45. 2 (H) 35.0 - 45.0 %    MCV 95.8 78.0 - 100.0 FL    MCH 31.4 24.0 - 34.0 PG    MCHC 32.7 31.0 - 37.0 g/dL    RDW 13.1 11.6 - 14.5 %    PLATELET 971 956 - 142 K/uL    MPV 10.2 9.2 - 11.8 FL    NRBC 0.0 0  WBC    ABSOLUTE NRBC 0.00 0.00 - 0.01 K/uL    NEUTROPHILS 83 (H) 40 - 73 %    LYMPHOCYTES 5 (L) 21 - 52 %    MONOCYTES 11 (H) 3 - 10 %    EOSINOPHILS 0 0 - 5 %    BASOPHILS 1 0 - 2 %    IMMATURE GRANULOCYTES 0 0.0 - 0.5 %    ABS. NEUTROPHILS 4.5 1.8 - 8.0 K/UL    ABS. LYMPHOCYTES 0.3 (L) 0.9 - 3.6 K/UL    ABS. MONOCYTES 0.6 0.05 - 1.2 K/UL    ABS. EOSINOPHILS 0.0 0.0 - 0.4 K/UL    ABS. BASOPHILS 0.0 0.0 - 0.1 K/UL    ABS. IMM.  GRANS. 0.0 0.00 - 0.04 K/UL    DF AUTOMATED     POC LACTIC ACID    Collection Time: 01/26/22 11:57 AM   Result Value Ref Range    Lactic Acid (POC) 1.04 0.40 - 2.00 mmol/L   CULTURE, BLOOD    Collection Time: 01/26/22 12:20 PM    Specimen: Blood   Result Value Ref Range    Special Requests: NO SPECIAL REQUESTS      Culture result: NO GROWTH AFTER 11 HOURS     EKG, 12 LEAD, INITIAL    Collection Time: 01/26/22  4:41 PM   Result Value Ref Range    Ventricular Rate 94 BPM    Atrial Rate 94 BPM    P-R Interval 198 ms    QRS Duration 80 ms    Q-T Interval 336 ms    QTC Calculation (Bezet) 420 ms    Calculated P Axis 66 degrees    Calculated R Axis 21 degrees    Calculated T Axis 54 degrees    Diagnosis       Normal sinus rhythm  Nonspecific T wave abnormality  Abnormal ECG  When compared with ECG of 31-JAN-2017 15:51,  premature ventricular complexes are no longer present  Criteria for Septal infarct are no longer present     URINALYSIS W/ RFLX MICROSCOPIC    Collection Time: 01/26/22  7:15 PM   Result Value Ref Range    Color YELLOW      Appearance CLEAR      Specific gravity 1.013 1.005 - 1.030      pH (UA) 6.5 5.0 - 8.0      Protein Negative NEG mg/dL    Glucose Negative NEG mg/dL    Ketone Negative NEG mg/dL    Bilirubin Negative NEG      Blood SMALL (A) NEG      Urobilinogen 0.2 0.2 - 1.0 EU/dL    Nitrites Positive (A) NEG      Leukocyte Esterase Negative NEG     URINE MICROSCOPIC ONLY    Collection Time: 01/26/22  7:15 PM   Result Value Ref Range    WBC 0 to 3 0 - 4 /hpf    RBC 0 to 3 0 - 5 /hpf    Epithelial cells FEW 0 - 5 /lpf    Bacteria 2+ (A) NEG /hpf   METABOLIC PANEL, BASIC    Collection Time: 01/27/22  3:26 AM   Result Value Ref Range    Sodium 136 136 - 145 mmol/L    Potassium 3.1 (L) 3.5 - 5.5 mmol/L    Chloride 101 100 - 111 mmol/L    CO2 29 21 - 32 mmol/L    Anion gap 6 3.0 - 18 mmol/L    Glucose 104 (H) 74 - 99 mg/dL    BUN 18 7.0 - 18 MG/DL    Creatinine 0.90 0.6 - 1.3 MG/DL    BUN/Creatinine ratio 20 12 - 20      GFR est AA >60 >60 ml/min/1.73m2    GFR est non-AA >60 >60 ml/min/1.73m2    Calcium 9.5 8.5 - 10.1 MG/DL   CBC WITH AUTOMATED DIFF    Collection Time: 01/27/22  3:26 AM   Result Value Ref Range    WBC 4.5 (L) 4.6 - 13.2 K/uL    RBC 4.61 4.20 - 5.30 M/uL    HGB 14.5 12.0 - 16.0 g/dL    HCT 43.8 35.0 - 45.0 %    MCV 95.0 78.0 - 100.0 FL    MCH 31.5 24.0 - 34.0 PG    MCHC 33.1 31.0 - 37.0 g/dL    RDW 13.1 11.6 - 14.5 %    PLATELET 156 063 - 236 K/uL    MPV 10.7 9.2 - 11.8 FL    NRBC 0.0 0  WBC    ABSOLUTE NRBC 0.00 0.00 - 0.01 K/uL    NEUTROPHILS 61 40 - 73 %    LYMPHOCYTES 18 (L) 21 - 52 %    MONOCYTES 20 (H) 3 - 10 %    EOSINOPHILS 0 0 - 5 %    BASOPHILS 0 0 - 2 %    IMMATURE GRANULOCYTES 0 0.0 - 0.5 %    ABS. NEUTROPHILS 2.8 1.8 - 8.0 K/UL    ABS. LYMPHOCYTES 0.8 (L) 0.9 - 3.6 K/UL    ABS. MONOCYTES 0.9 0.05 - 1.2 K/UL    ABS. EOSINOPHILS 0.0 0.0 - 0.4 K/UL    ABS. BASOPHILS 0.0 0.0 - 0.1 K/UL    ABS. IMM. GRANS. 0.0 0.00 - 0.04 K/UL    DF AUTOMATED       All lab results for the last 24 hours reviewed.      Scheduled Medications Reviewed:  Current Facility-Administered Medications   Medication Dose Route Frequency    potassium chloride (K-DUR, KLOR-CON M20) SR tablet 20 mEq  20 mEq Oral Q2H    lidocaine 4 % patch 2 Patch  2 Patch TransDERmal Q24H    PHARMACY INFORMATION NOTE  1 Each Other ONCE    piperacillin-tazobactam (ZOSYN) 3.375 g in 0.9% sodium chloride (MBP/ADV) 100 mL MBP  3.375 g IntraVENous Q8H    sodium chloride (NS) flush 5-40 mL  5-40 mL IntraVENous Q8H    heparin (porcine) injection 5,000 Units  5,000 Units SubCUTAneous Q8H    amLODIPine (NORVASC) tablet 5 mg  5 mg Oral DAILY    atorvastatin (LIPITOR) tablet 10 mg  10 mg Oral DAILY    buPROPion XL (WELLBUTRIN XL) tablet 300 mg  300 mg Oral 7am    cholecalciferol (VITAMIN D3) (1000 Units /25 mcg) tablet 2,000 Units  2,000 Units Oral DAILY    traZODone (DESYREL) tablet 100 mg  100 mg Oral QHS         Imaging, microbiology, and EKG/Telemetry:    Imaging:  MODALITY IMPRESSION   XR Results from Hospital Encounter encounter on 01/26/22    XR CHEST PORT    Narrative  EXAM: AP portable chest.    Indications: meets SIRS criteria    Time stamp: 1121 hours  Comparison: CT chest 2/2/2019    Findings:    Lines/Tubes/Devices:  None  LUNGS: There is mild vascular congestion with some mild thickening of the  interstitium. No focal consolidation, pleural effusion or pneumothorax. MEDIASTINUM: Normal size. Calcified aortic atherosclerosis is present. BONES/SOFT TISSUES: No acute findings    Impression  :    1.  Mild vascular congestion and edema. No focal infiltrates. 2. Aortic atherosclerosis. CT Results from [place] encounter on 01/26/22    CT ABD PELV W CONT    Narrative  CT of the Abdomen and Pelvis With Contrast    CPT CODE: 22045    CLINICAL HISTORY: Lower abdominal pain and urinary retention. .    TECHNIQUE: After administration of oral and nonionic intravenous contrast, 5 mm  helical scan was obtained of the abdomen and pelvis. Sagittal and coronal  reformations then created with the original data set.  All CT scans at this  facility are performed using dose optimization techniques as appropriate to a  performed exam, to include automated exposure control, adjustment of the mA  and/or kV according to patient's size (including appropriate matching for site  specific examinations), or use of iterative reconstruction technique. COMPARISON:    FINDINGS:    CT Abdomen and pelvis:    Lung bases: Ill-defined fuzzy density left lower lobe posteriorly. Otherwise  lung bases clear. Bilateral breast implants noted incidentally. Heart and pericardium: Moderate burden coronary artery disease at the heart. Liver: Peripherally calcified cyst left lobe liver. Otherwise unremarkable. Gallbladder/biliary: Prior cholecystectomy. No duct dilatation. Spleen: Normal.    Pancreas: Normal enhancement. No duct dilatation. Adrenals: Normal.    Kidneys:  Normally enhancing. Symmetric enhancement. Multiple cortical cysts. Largest is lower pole right kidney and measures 6.6 cm. No hydronephrosis or  nephrolithiasis. Retroperitoneum: Great vessels unremarkable. Lymph nodes: No adenopathy upper abdomen, mesentery, retroperitoneum, pelvis,  groins. Bowel: Stomach, small bowel, appendix are normal. There are scattered  diverticula at the colon. Mild fat stranding adjacent to the distal left colon  (55). No extraluminal air. Previous sigmoid resection. Peritoneal space: No free fluid. :  Urinary bladder collapsed around Aly catheter balloon. Uterus normal. No  adnexal masses. Abdominal wall/MSK: No hernias. No acute abnormalities at skeleton. Impression  Mild haziness around the distal left colon with underlying diverticula  compatible with mild diverticulitis. Renal cysts. Peripherally calcified hepatic cyst.    Bladder not well evaluated as is collapsed around a Aly catheter balloon. Density left base that likely atelectasis. Cannot exclude small focus of  pneumonitis. MRI Results from [place] encounter on 01/26/22    MRI [facility] SPINE W WO CONT    Narrative  Preliminary report MRI cervical and thoracic and lumbar spine:    Study motion degraded.     Cervical spine: Normal alignment. Craniocervical fusion. Multilevel degenerative  changes through cervical spine. Mild spinal stenosis C4/C5, C5/C6. Multiple  levels foraminal narrowing, most marked C5/C6. Thoracic spine: Normal alignment. Patient kyphotic. No acute loss of vertebral  body height. Multilevel mild degenerative changes at the lumbar spine with small  disc bulges but no critical stenosis. Lumbar spine: Normal alignment. Mild rightward curvature lumbar spine. Multilevel facet arthropathy and degenerative changes. Borderline spinal  stenosis L4/L5. No acute loss of vertebral body height. Multiple levels  foraminal narrowing due to facet encroachment: moderately severe on the right  L4/L5 and L5/S1, moderately on the left L4/L5 and L2/L3, and marked on the left  L1/L2. Tarlov cysts at the sacrum, not typically symptomatic. Additional soft tissues: Multiple cysts both kidneys. Impression  PRELIMINARY IMPRESSION: NO CRITICAL SPINAL STENOSIS. MULTILEVEL DEGENERATIVE CHANGES. MILD SPINAL STENOSIS C4/C5 AND C5/C6. MULTIPLE  LEVELS FORAMINAL NARROWING IN CERVICAL AND LUMBAR SPINE. NO ACUTE COMPRESSION FRACTURE. ULTRASOUND No results found for this or any previous visit.        Cardiology Procedures/Testing:  MODALITY RESULTS   EKG Results for orders placed or performed during the hospital encounter of 01/26/22   EKG, 12 LEAD, INITIAL   Result Value Ref Range    Ventricular Rate 94 BPM    Atrial Rate 94 BPM    P-R Interval 198 ms    QRS Duration 80 ms    Q-T Interval 336 ms    QTC Calculation (Bezet) 420 ms    Calculated P Axis 66 degrees    Calculated R Axis 21 degrees    Calculated T Axis 54 degrees    Diagnosis       Normal sinus rhythm  Nonspecific T wave abnormality  Abnormal ECG  When compared with ECG of 31-JAN-2017 15:51,  premature ventricular complexes are no longer present  Criteria for Septal infarct are no longer present         ECHO      Special Testing/Procedures:  MODALITY RESULTS MICRO All Micro Results     Procedure Component Value Units Date/Time    CULTURE, BLOOD [[id number]] Collected: 01/26/22 1150    Order Status: Completed Specimen: Blood Updated: 01/27/22 0643     Special Requests: NO SPECIAL REQUESTS        Culture result: NO GROWTH AFTER 18 HOURS       CULTURE, BLOOD [[id number]] Collected: 01/26/22 1220    Order Status: Completed Specimen: Blood Updated: 01/27/22 0643     Special Requests: NO SPECIAL REQUESTS        Culture result: NO GROWTH AFTER 11 HOURS       CULTURE, URINE [[id number]] Collected: 01/26/22 1915    Order Status: Completed Specimen: Cath Urine Updated: 01/27/22 0243    RESPIRATORY VIRUS PANEL W/COVID-19, PCR [[id number]]     Order Status: Sent Specimen: NASOPHARYNGEAL SWAB          UA No results found for this or any previous visit. PATH        Assessment/Plan   Wilfredo Molina is an 80 y.o. female with PMH of depression, anxiety, diverticulitis, osteoarthritis, HLD, HTN, GERD, neurofibromatosis admitted on 1/26/2022  for urinary retention and lower back pain. Urinary Retention, LE weakness  -Patient presents with urinary retention with worsening bilateral hip and lower back pain, LE weakness. Patient says she has been having problems with UR for some time and fell out of a chair that slipped on 1/26. After which, she could not get up from her fall and was brought into the ED. Patient denied urinary or fecal incontinence or saddle anesthesia. Complains of constipation for unknown amount of time. Feverish patient with no lower back point tenderness and no known history of IV drug use, low suspicion for spinal abscess. - Bladder scan: 500 mL, desai placed  -MRI notable for  \"Cervical spine:  -Motion artifact significantly limits evaluation.  -Suboccipital through C4 posterior hardware fixation as above. Associated  artifact further limits evaluation.  -Mild to moderate spinal canal and foraminal stenoses at C4-C7 levels. Multilevel mild disc herniations. Left greater than right notable facet\"  arthropathy at C4-C7 levels. - Hx of neurofibromatosis, fall 2 years ago with cervial spine damageLast seen by neurosurgery 10/21 noted stable cervical spine MRI with known nerve sheath tumors around cervical cord with no active compression or cord signal change. 10/21 visit also noted that patient reported gait imbalance and using a wheelchair. On interview today patient reported using a walker to ambulate at home. - Home Meds: norco, wellbutrin, trazadone, secondary antidepressant starting with R (denies remeron, but will ask  what it is)  - Consult: Neuro needed  Plan  [] Place neuro consult  [] FU Medications from   [] daily CBC, CMP, Mg, Phos  [] PT/OT for strengthening  []continue desai for UR and voiding trial prior to discharge      UTI w/ urinary retention  Patient febrile to 100.6 on arrival.  WBC wnl. Did not meet sepsis criteria. UA positive for small blood, nitrites, and 2+ bacteria. Urine culture sent. Zosyn started. Currently no tenderness to palpation  [] continue zosyn   [] FU urine culture  [] FU blood cultures x 2    Diverticulitis  Patient has known history of diverticulitis, CT abdomen/pelvis 1/25 revealed scattered diverticula at colon consistent with mild diverticulitis. Patient no longer febrile and no tenderness to abdominal palpation. Patient unsure of date of last BM and has had no BM while hospitalized. Started on zosyn for UTI given that cipro and flagyl not available. Plan  [] miralax PRN  [] continue zosyn     Fever/COVID rule out  Given presentation to ED with fever and sore throat, COVID needed to be ruled out. Fever more likely due to UTI/diverticulitis, but virus panel ordered. Patient COVID vaccinated x2 last dose 3/10, due for booster dose. Plan  []FU respiratory virus panel    Chronic Pain, DJD, multiple joints  Patient complaining of chronic lower back pain (lumbar and sacral), denies any worsening with fall. No tenderness to general palpation of bilateral hips with no significant MSK abnormalities on exam.  CT abdomen/pelvis did not note any acute abnormalities at skeleton. Medications per Dr Miner [facility] note 11/2021 report norco and meloxicam. Patient reported taking norco for pain. Plan  [] continue norco  q6h PRN  [] lidocaine patches PRN    Depression/Anxiety  Patient reports taking PRN xanax, daily buproprion, trazadone, and recently starting an antidepressant starting with \"r\" but denies remeron. Will have  check what the medication is.   Plan  [] home bupropion XL 300mg daily  [] home trazodone 100mg daily at bedtime  [] home xanax 0.25mg BID PRN  []FU with  on additional medication     HTN - BP elevated to 150/77 on arrival. Reports not taking any HTN medications  Plan  [] started on home amlodipine 5mg daily  [] monitor BP per unit protocol     Hyperlipidemia  Does not report taking any hyperlipidemia medications  Plan  [] start on home atorvastatin 10mg     [employer]:  - VS per unit routine  - supplemental O2 for sats < 92%, not currently on 2L NC although recorded  - incentive spirometer  - PT/OT/CM  Diet  Adult regular   DVT Prophylaxis  SQH   GI Prophylaxis   -    Code status  DNR   Disposition  TBD      Point of Contact [place]  Relationship:   [phone]           Stephen Garber MS-3  [facility], M.D. [place] of 2022

## 2022-01-27 NOTE — H&P
Admission History and Physical  [facility]          Patient: Clifton Velasquez MRN: [id number]  [facility]: [id number]    YOB: 1940  Age: 80 y.o. Sex: female      Admission Date: 1/26/2022       HPI:     Clifton Velasquez is a 80 y.o. female with depression, anxiety, diverticulitis, osteoarthritis, HLD, HTN, GERD, neurofibromatosis, now presenting with complaint of difficulty urinating and lower back pain. Patient reports that she has been having difficulty urinating for some time and increasing pain in her lower back. Patient is unsure how long these things have been going on. Patient reports a history of constipation and cannot recall her last bowel movement. Patient denies feeling weak or recent falls, reports ambulating at home with a walker. Patient appears to be a reliable historian, however is fatigued and has difficulty answering some questions. ED Course (See objective for values/interpretations): On arrival to ED patient febrile to 100.6, with no tachycardia, tachypnea, or hypotension. Per ED report, patient's  was concerned for urinary retention and bilateral LE weakness, he reported that patient was at baseline, eating and acting normally 1/25. Patient's LE weakness improved, but patient still unable to bear weight. Urinalysis positive for bacteria, nitrites, and small amount of blood, urine and blood cultures collected. WBC 5.4, lactic acid 1.04. Bladder scan revealed ~500ml residual urine. Aly catheter placed    Labs obtained: CMP, CBC, Lactic acid, UA, urine culture, blood culture,   Medications administered: zosyn, tylenol, norco, ativan  Imaging obtained: CXR, CT head, CT cervical spine, MRI cervical, thoracic, lumbar spine, CT abd/pelvis    Review of Systems   Constitutional: Negative for chills and fever. Respiratory: Positive for cough. Negative for shortness of breath. Cardiovascular: Negative for chest pain and palpitations. Gastrointestinal: Positive for constipation and nausea. Negative for abdominal pain, diarrhea and vomiting. Genitourinary: Negative for flank pain. Urinary retention   Musculoskeletal: Positive for back pain and myalgias. Skin: Negative for itching and rash. Neurological: Negative for dizziness, tingling and headaches.        Past Medical History:   Diagnosis Date    Arrhythmia     hx PVCs    Arthritis     RHEUMATOID  and Osteo arthritis    Balance disorder 12/2016    Cancer ([facility] [address]     BREAST, broderick-broderick mastectomies    Constipation     Depression     Diverticular disease     Fibromyalgia     GERD (gastroesophageal reflux disease)     Headache(784.0)     Hearing reduced     hearing loss,ringing in ears    Hypertension     Insomnia     Neurological disorder     headaches,difficulty walking,poor memory    Osteoporosis     Other ill-defined conditions(799.89)     absessed perforated diverticulum    Other ill-defined conditions(799.89)     osteoporosis    Psychiatric disorder     Sleep apnea     CPAP    Vision decreased     poor vision,       Past Surgical History:   Procedure Laterality Date    FOOT/TOES SURGERY PROC UNLISTED      HX BREAST AUGMENTATION  1996    Saline Implants    HX CHOLECYSTECTOMY      HX GI  2012    LOW ANTERIOR RESECTION WITH COLOSTOMY AND LEFT OOPHORECTOMY    HX KNEE ARTHROSCOPY Right     HX MASTECTOMY  1996    bilateral mastectomy    HX ORTHOPAEDIC      RIGHT FOOT AND ANKLE SX    HX ORTHOPAEDIC Left 2017    wrist repair    HX OTHER SURGICAL      multiple sx right leg and foot       Family History   Problem Relation Age of Onset    Hypertension Mother     Headache Mother     Diabetes Father     Hypertension Father     Heart Attack Father        Social History     Socioeconomic History    Marital status:    Tobacco Use    Smoking status: Former Smoker     Quit date: 11/25/2006     Years since quitting: 15.1    Smokeless tobacco: Never Used Substance and Sexual Activity    Alcohol use: No    Drug use: No       Allergies   Allergen Reactions    Latuda [Lurasidone] Other (comments)     Memory loss    Neurontin [Gabapentin] Other (comments)     PATIENT STATES SHE IS NOT ALLERGIC       Prior to Admission Medications   Prescriptions Last Dose Informant Patient Reported? Taking? ALPRAZolam (XANAX) 1 mg tablet   Yes No   Sig: Take 0.5 mg by mouth as needed. Calcium-Cholecalciferol, D3, 600 mg(1,500mg) -400 unit cap   Yes No   Sig: Take  by mouth. Denosumab (PROLIA) 60 mg/mL injection   Yes No   Si mg by SubCUTAneous route. HYDROcodone-acetaminophen (NORCO)  mg tablet   Yes No   Sig: hydrocodone 10 mg-acetaminophen 325 mg tablet   amLODIPine (NORVASC) 5 mg tablet   Yes No   Sig: Take 5 mg by mouth daily. atorvastatin (LIPITOR) 10 mg tablet   Yes No   Sig: Take  by mouth daily. brexpiprazole (REXULTI) 1 mg tab tablet   Yes No   Sig: Take  by mouth daily. buPROPion XL (WELLBUTRIN XL) 150 mg tablet   Yes No   Sig: Take 300 mg by mouth every morning. cholecalciferol (VITAMIN D3) 1,000 unit cap   Yes No   Sig: Take  by mouth daily. diltiazem hcl (CARDIZEM) 120 mg tablet   Yes No   Sig: Take 120 mg by mouth daily. docusate calcium (SURFAK) 240 mg capsule   Yes No   Sig: Take 240 mg by mouth two (2) times a day. esomeprazole (NEXIUM) 20 mg capsule   Yes No   Sig: Take 20 mg by mouth daily. lurasidone (LATUDA) 20 mg tab tablet   Yes No   Sig: Take 40 mg by mouth. ondansetron (ZOFRAN ODT) 4 mg disintegrating tablet   Yes No   Sig: Take 4 mg by mouth every eight (8) hours as needed for Nausea. Indications: ACUTE GASTROENTERITIS-RELATED VOMITING IN PEDIATRICS   oxyCODONE-acetaminophen (PERCOCET) 7.5-325 mg per tablet   Yes No   Sig: Take  by mouth.    oxyMORphone 5 mg PO ER tablet   No No   Sig: Take one tablet at bedtime for chronic, severe pain   polyethylene glycol (MIRALAX) 17 gram packet   No No   Sig: Take 1 Packet by mouth daily.   promethazine (PHENERGAN) 25 mg tablet   No No   Sig: Take 1 Tab by mouth every six (6) hours as needed for Nausea. senna-docusate (PERICOLACE) 8.6-50 mg per tablet   No No   Sig: Take 1 Tab by mouth daily. tiZANidine (ZANAFLEX) 2 mg capsule   Yes No   Sig: Take 2 mg by mouth three (3) times daily. traZODone (DESYREL) 50 mg tablet   Yes No   Sig: Take 150 mg by mouth nightly. Indications: INSOMNIA   triamcinolone acetonide (KENALOG) 0.1 % ointment   Yes No   Sig: Apply  to affected area. use thin layer      Facility-Administered Medications: None       Objective:     Patient Vitals for the past 24 hrs:   Temp Pulse Resp BP SpO2   01/26/22 1103 -- -- -- -- 98 %   01/26/22 1053 (!) 100.6 °F (38.1 °C) 85 16 (!) 150/77 98 %       Physical Exam:   General:  AAOx2, NAD   HEENT: Conjunctiva pink, sclera anicteric. EOMI. CV:  RRR, no M/G/R appreciated. No visible pulsations or thrills. RESP:  Unlabored breathing. Lungs CTAB, no wheezes, rales or rhonchi appreciated. Equal expansion bilaterally. ABD:  Soft,nondistended, general tenderness to palpation (patient unable to identify location of pain). BS (+). No guarding. MS: Point tenderness to general palpation of cervical and thoracic spine. No point tenderness to palpation of lumbar spine. Tenderness to palpation of bilateral hips, L>R. L foot externally rotated, no other gross abnormalities appreciated  Neuro:  CN II-XII grossly intact. 5/5 strength bilateral upper extremities 4/5 bilateral lower extremities with R >L. Ext:  No edema. 2+ radial pulses bilaterally.   Skin:  No rashes, lesions, or ulcers appreciated  Psych: normal mood and affect     Labs & Imaging:   Recent Results (from the past 48 hour(s))   METABOLIC PANEL, COMPREHENSIVE    Collection Time: 01/26/22 11:50 AM   Result Value Ref Range    Sodium 134 (L) 136 - 145 mmol/L    Potassium 3.9 3.5 - 5.5 mmol/L    Chloride 98 (L) 100 - 111 mmol/L    CO2 30 21 - 32 mmol/L    Anion gap 6 3.0 - 18 mmol/L    Glucose 126 (H) 74 - 99 mg/dL    BUN 21 (H) 7.0 - 18 MG/DL    Creatinine 0.96 0.6 - 1.3 MG/DL    BUN/Creatinine ratio 22 (H) 12 - 20      GFR est AA >60 >60 ml/min/1.73m2    GFR est non-AA 56 (L) >60 ml/min/1.73m2    Calcium 9.9 8.5 - 10.1 MG/DL    Bilirubin, total 0.7 0.2 - 1.0 MG/DL    ALT (SGPT) 26 13 - 56 U/L    AST (SGOT) 13 10 - 38 U/L    Alk. phosphatase 79 45 - 117 U/L    Protein, total 7.5 6.4 - 8.2 g/dL    Albumin 3.9 3.4 - 5.0 g/dL    Globulin 3.6 2.0 - 4.0 g/dL    A-G Ratio 1.1 0.8 - 1.7     CBC WITH AUTOMATED DIFF    Collection Time: 01/26/22 11:50 AM   Result Value Ref Range    WBC 5.4 4.6 - 13.2 K/uL    RBC 4.72 4.20 - 5.30 M/uL    HGB 14.8 12.0 - 16.0 g/dL    HCT 45.2 (H) 35.0 - 45.0 %    MCV 95.8 78.0 - 100.0 FL    MCH 31.4 24.0 - 34.0 PG    MCHC 32.7 31.0 - 37.0 g/dL    RDW 13.1 11.6 - 14.5 %    PLATELET 845 228 - 172 K/uL    MPV 10.2 9.2 - 11.8 FL    NRBC 0.0 0  WBC    ABSOLUTE NRBC 0.00 0.00 - 0.01 K/uL    NEUTROPHILS 83 (H) 40 - 73 %    LYMPHOCYTES 5 (L) 21 - 52 %    MONOCYTES 11 (H) 3 - 10 %    EOSINOPHILS 0 0 - 5 %    BASOPHILS 1 0 - 2 %    IMMATURE GRANULOCYTES 0 0.0 - 0.5 %    ABS. NEUTROPHILS 4.5 1.8 - 8.0 K/UL    ABS. LYMPHOCYTES 0.3 (L) 0.9 - 3.6 K/UL    ABS. MONOCYTES 0.6 0.05 - 1.2 K/UL    ABS. EOSINOPHILS 0.0 0.0 - 0.4 K/UL    ABS. BASOPHILS 0.0 0.0 - 0.1 K/UL    ABS. IMM.  GRANS. 0.0 0.00 - 0.04 K/UL    DF AUTOMATED     POC LACTIC ACID    Collection Time: 01/26/22 11:57 AM   Result Value Ref Range    Lactic Acid (POC) 1.04 0.40 - 2.00 mmol/L   EKG, 12 LEAD, INITIAL    Collection Time: 01/26/22  4:41 PM   Result Value Ref Range    Ventricular Rate 94 BPM    Atrial Rate 94 BPM    P-R Interval 198 ms    QRS Duration 80 ms    Q-T Interval 336 ms    QTC Calculation (Bezet) 420 ms    Calculated P Axis 66 degrees    Calculated R Axis 21 degrees    Calculated T Axis 54 degrees    Diagnosis       Normal sinus rhythm  Nonspecific T wave abnormality  Abnormal ECG  When compared with ECG of 31-JAN-2017 15:51,  premature ventricular complexes are no longer present  Criteria for Septal infarct are no longer present     URINALYSIS W/ RFLX MICROSCOPIC    Collection Time: 01/26/22  7:15 PM   Result Value Ref Range    Color YELLOW      Appearance CLEAR      Specific gravity 1.013 1.005 - 1.030      pH (UA) 6.5 5.0 - 8.0      Protein Negative NEG mg/dL    Glucose Negative NEG mg/dL    Ketone Negative NEG mg/dL    Bilirubin Negative NEG      Blood SMALL (A) NEG      Urobilinogen 0.2 0.2 - 1.0 EU/dL    Nitrites Positive (A) NEG      Leukocyte Esterase Negative NEG     URINE MICROSCOPIC ONLY    Collection Time: 01/26/22  7:15 PM   Result Value Ref Range    WBC 0 to 3 0 - 4 /hpf    RBC 0 to 3 0 - 5 /hpf    Epithelial cells FEW 0 - 5 /lpf    Bacteria 2+ (A) NEG /hpf       Assessment & Plan:   80 y.o. female with PMH depression, anxiety, diverticulitis, osteoarthritis, HLD, HTN, GERD, neurofibromatosis, chronic pain, now admitted for UTI and lower extremity weakness. Urinary Retention, LE weakness  Patient presents with urinary retention in the setting of worsening bilateral hip/lower back pain, LE weakness and worsening mobility. Patient's  reported to ED that patient complained of urinary retention morning of 1/26. Bladder scan in ED revealed ~500ml urine retained and a desai was subsequently placed. Patient's  reported to ED that when patient tried to stand from chair on 1/26,  she slowly slid to floor. Patient denied urinary or fecal incontinence or saddle anesthesia. On exam, no point tenderness to lower back, strength 4/5 bilateral LE with R stronger than L.  ED reported patient currently unable to bear weight. MRI prelim read revealed multilevel degenerative changes, mild spinal stenosis C4/C5 and C5/C6, and multiple levels foraminal narrowing in cervical and lumbar spine, but no acute compression fracture or critical stenosis.   Patient has history of neurofibromatosis. Last seen by neurosurgery 10/21 noted stable cervical spine MRI with known nerve sheath tumors around cervical cord with no active compression or cord signal change. 10/21 visit also noted that patient reported gait imbalance and using a wheelchair. On interview today patient reported using a walker to ambulate at home. Feverish patient with no lower back point tenderness and no known history of IV drug use, low suspicion for spinal abscess. - admit to medicine  - PT/OT for strengthening exercises  - FU MRI cervical spine/lumbar spine final read  - continue desai for urinary retention and voiding trial prior to discharge  - consult neurology consult in morning in morning   - daily CBC, CMP, Mg, Phos    UTI w/ urinary retention  Patient febrile to 100.6 on arrival.  WBC wnl. Did not meet sepsis criteria. UA positive for small blood, nitrites, and 2+ bacteria. Urine culture sent. Zosyn started. General tenderness to abdominal palpation on exam.  - continue zosyn   - FU urine culture  - FU blood cultures x 2    Diverticulitis  Patient has known history of diverticulitis, CT abdomen/pelvis 1/25 revealed scattered diverticula at colon consistent with mild diverticulitis. Patient febrile to 100.6 with general tenderness to abdominal palpation. Patient unsure of date of last BM. Previously started on Cipro and Flagyl in ED (however, Flagyl is currently on national back order per pharm). - miralax PRN  - continue zosyn     COVID rule out  Elderly patient presenting with isolated fever. Cause for fever multifactorial including ?urosepsis from underlying UTI vs diverticulitis, vs COVID. Patient COVID vaccinated x2 last dose 3/10, due for booster dose. - FU respiratory virus panel    Chronic Pain, DJD, multiple joints  Patient complaining of increased lower back pain.   On exam no point tenderness of lumbar spine, but signifcant tenderness to general palpation of bilateral hips and L foot with no significant MSK abnormalities on exam.  CT abdomen/pelvis did not note any acute abnormalities at skeleton. Medications per Dr Ang Portillo note 11/2021 report norco and meloxicam.  Patient reported taking norco for pain. - continue norco  q6h PRN  - lidocaine patches PRN    Depression/Anxiety  - home bupropion XL 300mg daily  - home trazodone 100mg daily at bedtime  - home xanax 0.25mg BID PRN    HTN - BP elevated to 150/77 on arrival.  - home amlodipine 5mg daily  - monitor BP per unit protocol    Hyperlipidemia  - home atorvastatin 10mg    [employer]:  - VS per unit routine  - supplemental O2 for sats < 92%  - incentive spirometer  - PT/OT/CM    Diet  Adult regular   DVT Prophylaxis  SQH   GI Prophylaxis   -    Code status  DNR   Disposition  TBD     Point of Contact [facility]  Relationship:   [phone]      Shelly Pruitt MD , PGY-1   [facility] Medicine   January 26, 2022, 11:13 PM          I saw and evaluated the patient. I reviewed with the intern the medical history and exam findings, and edited the note as above. I discussed the findings, assessment, and plan with the intern and agree with the note as documented.     MD Brant [address]. PGY-2

## 2022-01-27 NOTE — ED NOTES
Pt given belongings that family brought for her. Princess Peña is the pt's daughter-in-law and her number is [phone]. Pt gave this Rn permission to share pt's information.

## 2022-01-27 NOTE — DISCHARGE INSTRUCTIONS
Patient Education   Learning About Coronavirus [phone])  Coronavirus [phone]): Overview  What is coronavirus (AUAGY-08)? The coronavirus disease (COVID-19) is caused by a virus. It is an illness that was first found in [place], [place], in December 2019. It has since spread worldwide. The virus can cause fever, cough, and trouble breathing. In severe cases, it can cause pneumonia and make it hard to breathe without help. It can cause death. Coronaviruses are a large group of viruses. They cause the common cold. They also cause more serious illnesses like Middle East respiratory syndrome (MERS) and severe acute respiratory syndrome (SARS). COVID-19 is caused by a novel coronavirus. That means it's a new type that has not been seen in people before. This virus spreads person-to-person through droplets from coughing and sneezing. It can also spread when you are close to someone who is infected. And it can spread when you touch something that has the virus on it, such as a doorknob or a tabletop. What can you do to protect yourself from coronavirus (COVID-19)? The best way to protect yourself from getting sick is to:  · Avoid areas where there is an outbreak. · Avoid contact with people who may be infected. · Wash your hands often with soap or alcohol-based hand sanitizers. · Avoid crowds and try to stay at least 6 feet away from other people. · Wash your hands often, especially after you cough or sneeze. Use soap and water, and scrub for at least 20 seconds. If soap and water aren't available, use an alcohol-based hand . · Avoid touching your mouth, nose, and eyes. What can you do to avoid spreading the virus to others? To help avoid spreading the virus to others:  · Cover your mouth with a tissue when you cough or sneeze. Then throw the tissue in the trash. · Use a disinfectant to clean things that you touch often. · Stay home if you are sick or have been exposed to the virus.  Don't go to school, work, or public areas. And don't use public transportation. · If you are sick:  ? Leave your home only if you need to get medical care. But call the doctor's office first so they know you're coming. And wear a face mask, if you have one.  ? If you have a face mask, wear it whenever you're around other people. It can help stop the spread of the virus when you cough or sneeze. ? Clean and disinfect your home every day. Use household  and disinfectant wipes or sprays. Take special care to clean things that you grab with your hands. These include doorknobs, remote controls, phones, and handles on your refrigerator and microwave. And don't forget countertops, tabletops, bathrooms, and computer keyboards. When to call for help  Call 911 anytime you think you may need emergency care. For example, call if:  · You have severe trouble breathing. (You can't talk at all.)  · You have constant chest pain or pressure. · You are severely dizzy or lightheaded. · You are confused or can't think clearly. · Your face and lips have a blue color. · You pass out (lose consciousness) or are very hard to wake up. Call your doctor now if you develop symptoms such as:  · Shortness of breath. · Fever. · Cough. If you need to get care, call ahead to the doctor's office for instructions before you go. Make sure you wear a face mask, if you have one, to prevent exposing other people to the virus. Where can you get the latest information? The following health organizations are tracking and studying this virus. Their websites contain the most up-to-date information. Alex Leija also learn what to do if you think you may have been exposed to the virus. · U.S. Centers for Disease Control and Prevention (CDC): The CDC provides updated news about the disease and travel advice. The website also tells you how to prevent the spread of infection.  www.cdc.gov  · World Health Organization [facility]): WHO offers information about the virus outbreaks. WHO also has travel advice. www.who.int  Current as of: April 1, 2020               Content Version: 12.4  © 2006-2020 Healthwise, Incorporated. Care instructions adapted under license by your healthcare professional. If you have questions about a medical condition or this instruction, always ask your healthcare professional. [address] any warranty or liability for your use of this information. Patient Education        Urinary Tract Infection (UTI) in Women: Care Instructions  Overview     A urinary tract infection, or UTI, is a general term for an infection anywhere between the kidneys and the urethra (where urine comes out). Most UTIs are bladder infections. They often cause pain or burning when you urinate. UTIs are caused by bacteria and can be cured with antibiotics. Be sure to complete your treatment so that the infection does not get worse. Follow-up care is a key part of your treatment and safety. Be sure to make and go to all appointments, and call your doctor if you are having problems. It's also a good idea to know your test results and keep a list of the medicines you take. How can you care for yourself at home? · Take your antibiotics as directed. Do not stop taking them just because you feel better. You need to take the full course of antibiotics. · Drink extra water and other fluids for the next day or two. This will help make the urine less concentrated and help wash out the bacteria that are causing the infection. (If you have kidney, heart, or liver disease and have to limit fluids, talk with your doctor before you increase the amount of fluids you drink.)  · Avoid drinks that are carbonated or have caffeine. They can irritate the bladder. · Urinate often. Try to empty your bladder each time. · To relieve pain, take a hot bath or lay a heating pad set on low over your lower belly or genital area.  Never go to sleep with a heating pad in place. To prevent UTIs  · Drink plenty of water each day. This helps you urinate often, which clears bacteria from your system. (If you have kidney, heart, or liver disease and have to limit fluids, talk with your doctor before you increase the amount of fluids you drink.)  · Urinate when you need to. · If you are sexually active, urinate right after you have sex. · Change sanitary pads often. · Avoid douches, bubble baths, feminine hygiene sprays, and other feminine hygiene products that have deodorants. · After going to the bathroom, wipe from front to back. When should you call for help? Call your doctor now or seek immediate medical care if:    · Symptoms such as fever, chills, nausea, or vomiting get worse or appear for the first time.     · You have new pain in your back just below your rib cage. This is called flank pain.     · There is new blood or pus in your urine.     · You have any problems with your antibiotic medicine. Watch closely for changes in your health, and be sure to contact your doctor if:    · You are not getting better after taking an antibiotic for 2 days.     · Your symptoms go away but then come back. Where can you learn more? Go to http://www.gray.com/  Enter A270 in the search box to learn more about \"Urinary Tract Infection (UTI) in Women: Care Instructions. \"  Current as of: February 10, 2021               Content Version: 13.0  © 0649-8050 [employer]. Care instructions adapted under license by [employer] (which disclaims liability or warranty for this information). If you have questions about a medical condition or this instruction, always ask your healthcare professional. [address] any warranty or liability for your use of this information. Gazelle Activation    Thank you for requesting access to Gazelle.  Please follow the instructions below to securely access and download your online medical record. Cuponzote allows you to send messages to your doctor, view your test results, renew your prescriptions, schedule appointments, and more. How Do I Sign Up? 1. In your internet browser, go to [email or web address]  2. Click on the First Time User? Click Here link in the Sign In box. You will be redirect to the New Member Sign Up page. 3. Enter your Cuponzote Access Code exactly as it appears below. You will not need to use this code after youve completed the sign-up process. If you do not sign up before the expiration date, you must request a new code. Cuponzote Access Code: Activation code not generated  Current Cuponzote Status: Active (This is the date your Cuponzote access code will )    4. Enter the last four digits of your Social Security Number (xxxx) and Date of Birth (mm/dd/yyyy) as indicated and click Submit. You will be taken to the next sign-up page. 5. Create a Cuponzote ID. This will be your Cuponzote login ID and cannot be changed, so think of one that is secure and easy to remember. 6. Create a Cuponzote password. You can change your password at any time. 7. Enter your Password Reset Question and Answer. This can be used at a later time if you forget your password. 8. Enter your e-mail address. You will receive e-mail notification when new information is available in [address] 9. Click Sign Up. You can now view and download portions of your medical record. 10. Click the Download Summary menu link to download a portable copy of your medical information. Additional Information    If you have questions, please visit the Frequently Asked Questions section of the Cuponzote website at [email or web address] Remember, Cuponzote is NOT to be used for urgent needs. For medical emergencies, dial 911.

## 2022-01-27 NOTE — PROGRESS NOTES
Problem: Mobility Impaired (Adult and Pediatric)  Goal: *Acute Goals and Plan of Care (Insert Text)  Description: Physical Therapy Goals  Initiated 1/27/2022 and to be accomplished within 7 day(s)  1. Patient will move from supine to sit and sit to supine , scoot up and down, and roll side to side in bed with supervision/set-up. 2.  Patient will transfer from bed to chair and chair to bed with supervision/set-up using the least restrictive device. 3.  Patient will perform sit to stand with supervision/set-up. 4.  Patient will ambulate with supervision/set-up for 50 feet with the least restrictive device. 5.  Patient will ascend/descend 4 stairs with 1 handrail(s) with minimal assistance/contact guard assist.    PLOF: Pt lives in a garage apt with  at one of her childrens homes, pt states she has to go up about 4 steps before getting into a chair lift that takes her into the house, mod ind with rollator/walker PTA      Outcome: Progressing Towards Goal     PHYSICAL THERAPY EVALUATION    Patient: Jose Stokes (53 y.o. female)  Date: 1/27/2022  Primary Diagnosis: Limited mobility [Z74.09]  Weakness [R53.1]  UTI (urinary tract infection) [N39.0]  Acute diverticulitis [K57.92]  Urinary retention [R33.9]        Precautions:   Fall  WBAT  PLOF: see above     ASSESSMENT :  Based on the objective data described below, the patient presents with generalized weakness, impaired balance and gait, decreased functional mobility. Pt seen with OT to maximize functional mobility and safety. Pt reporting arthritis \"all over my whole body\" and thus stiffness from lying in bed. Pt mobilizing supine <> sit and sit <> stand this date with min A x 2 this date. Pt given RW and able to minimally take steps laterally as limited by LE stiffness and LBP. Pt returned to supine and left with all needs met, call bell within reach, nursing notified of progress this date. Will continue to follow per POC.      Patient will benefit from skilled intervention to address the above impairments. Patient's rehabilitation potential is considered to be Good  Factors which may influence rehabilitation potential include:   []         None noted  []         Mental ability/status  [x]         Medical condition  []         Home/family situation and support systems  []         Safety awareness  []         Pain tolerance/management  []         Other:      PLAN :  Recommendations and Planned Interventions:   [x]           Bed Mobility Training             [x]    Neuromuscular Re-Education  [x]           Transfer Training                   []    Orthotic/Prosthetic Training  [x]           Gait Training                          []    Modalities  [x]           Therapeutic Exercises           []    Edema Management/Control  [x]           Therapeutic Activities            [x]    Family Training/Education  [x]           Patient Education  []           Other (comment):    Frequency/Duration: Patient will be followed by physical therapy 1-2 times per day/4-7 days per week to address goals. Discharge Recommendations: Home Health vs rehab pending progress   Further Equipment Recommendations for Discharge: N/A- pt owns RW and rollator     SUBJECTIVE:   Patient stated I am stiff, I have arthritis everywhere.     OBJECTIVE DATA SUMMARY:     Past Medical History:   Diagnosis Date    Arrhythmia     hx PVCs    Arthritis     RHEUMATOID  and Osteo arthritis    Balance disorder 12/2016    Cancer ([facility] [address]     BREAST, broderick-broderick mastectomies    Constipation     Depression     Diverticular disease     Fibromyalgia     GERD (gastroesophageal reflux disease)     Headache(784.0)     Hearing reduced     hearing loss,ringing in ears    Hypertension     Insomnia     Neurological disorder     headaches,difficulty walking,poor memory    Osteoporosis     Other ill-defined conditions(799.89)     absessed perforated diverticulum    Other ill-defined conditions(799.89)     osteoporosis Psychiatric disorder     Sleep apnea     CPAP    Vision decreased     poor vision,     Past Surgical History:   Procedure Laterality Date    FOOT/TOES SURGERY PROC UNLISTED      HX BREAST AUGMENTATION  1996    Saline Implants    HX CHOLECYSTECTOMY      HX GI  2012    LOW ANTERIOR RESECTION WITH COLOSTOMY AND LEFT OOPHORECTOMY    HX KNEE ARTHROSCOPY Right     HX MASTECTOMY  1996    bilateral mastectomy    HX ORTHOPAEDIC      RIGHT FOOT AND ANKLE SX    HX ORTHOPAEDIC Left 2017    wrist repair    HX OTHER SURGICAL      multiple sx right leg and foot     Barriers to Learning/Limitations: yes;  physical  Compensate with: Visual Cues, Verbal Cues, and Tactile Cues  Home Situation:  Home Situation  Home Environment: Private residence  # Steps to Enter: 3  Rails to Enter: Yes  One/Two Story Residence: Two story  Lift Chair Available: Yes  Living Alone: No  Support Systems: Child(kerry),Spouse/Significant Other  Current DME Used/Available at Home: Walker, rollator,Walker, rolling  Tub or Shower Type: Tub/Shower combination  Critical Behavior:  Neurologic State: Alert  Orientation Level: Oriented to person;Oriented to place;Oriented to time  Cognition: Follows commands  Safety/Judgement: Fall prevention  Psychosocial  Patient Behaviors: Calm; Cooperative                 Strength:    Strength: Generally decreased, functional       Tone & Sensation:   Tone: Normal       Sensation: Intact        Range Of Motion:  AROM: Within functional limits       Functional Mobility:  Bed Mobility:     Supine to Sit: Minimum assistance;Assist x2  Sit to Supine: Minimum assistance;Assist x2     Transfers:  Sit to Stand: Minimum assistance;Assist x2  Stand to Sit: Minimum assistance;Assist x2       Balance:   Sitting: Impaired  Sitting - Static: Good (unsupported)  Sitting - Dynamic: Fair (occasional)  Standing: Impaired; With support  Standing - Static: Fair (-)  Standing - Dynamic : Fair (-)    Ambulation/Gait Training:    Gait Description (WDL): Exceptions to WDL (able to minimally shuffle steps laterally x 2 ft with min A )      Pain:  Pain level pre-treatment: 0/10   Pain level post-treatment: 0/10   Pain Intervention(s) : Medication (see MAR); Rest, Ice, Repositioning  Response to intervention: Nurse notified    Activity Tolerance:   Good    Please refer to the flowsheet for vital signs taken during this treatment. After treatment:   []         Patient left in no apparent distress sitting up in chair  [x]         Patient left in no apparent distress in bed  [x]         Call bell left within reach  [x]         Nursing notified  []         Caregiver present  []         Bed alarm activated  []         SCDs applied    COMMUNICATION/EDUCATION:   [x]         Role of Physical Therapy in the acute care setting. [x]         Fall prevention education was provided and the patient/caregiver indicated understanding. [x]         Patient/family have participated as able in goal setting and plan of care. [x]         Patient/family agree to work toward stated goals and plan of care. []         Patient understands intent and goals of therapy, but is neutral about his/her participation. []         Patient is unable to participate in goal setting/plan of care: ongoing with therapy staff.  []         Other:     Thank you for this referral.  Deven Olivas   Time Calculation: 23 mins      Eval Complexity: History: MEDIUM  Complexity : 1-2 comorbidities / personal factors will impact the outcome/ POC Exam:MEDIUM Complexity : 3 Standardized tests and measures addressing body structure, function, activity limitation and / or participation in recreation  Presentation: MEDIUM Complexity : Evolving with changing characteristics  Clinical Decision Making:Medium Complexity    Overall Complexity:MEDIUM

## 2022-01-28 LAB
ANION GAP SERPL CALC-SCNC: 8 MMOL/L (ref 3–18)
ATRIAL RATE: 94 BPM
BASOPHILS # BLD: 0 K/UL (ref 0–0.1)
BASOPHILS NFR BLD: 1 % (ref 0–2)
BUN SERPL-MCNC: 23 MG/DL (ref 7–18)
BUN/CREAT SERPL: 26 (ref 12–20)
CALCIUM SERPL-MCNC: 8.9 MG/DL (ref 8.5–10.1)
CALCULATED P AXIS, ECG09: 66 DEGREES
CALCULATED R AXIS, ECG10: 21 DEGREES
CALCULATED T AXIS, ECG11: 54 DEGREES
CHLORIDE SERPL-SCNC: 104 MMOL/L (ref 100–111)
CO2 SERPL-SCNC: 23 MMOL/L (ref 21–32)
CREAT SERPL-MCNC: 0.9 MG/DL (ref 0.6–1.3)
CRP SERPL HS-MCNC: >9.5 MG/L
DIAGNOSIS, 93000: NORMAL
DIFFERENTIAL METHOD BLD: ABNORMAL
EOSINOPHIL # BLD: 0 K/UL (ref 0–0.4)
EOSINOPHIL NFR BLD: 1 % (ref 0–5)
ERYTHROCYTE [DISTWIDTH] IN BLOOD BY AUTOMATED COUNT: 13.3 % (ref 11.6–14.5)
GLUCOSE SERPL-MCNC: 80 MG/DL (ref 74–99)
HCT VFR BLD AUTO: 41 % (ref 35–45)
HGB BLD-MCNC: 13.8 G/DL (ref 12–16)
IMM GRANULOCYTES # BLD AUTO: 0 K/UL (ref 0–0.04)
IMM GRANULOCYTES NFR BLD AUTO: 0 % (ref 0–0.5)
LYMPHOCYTES # BLD: 0.8 K/UL (ref 0.9–3.6)
LYMPHOCYTES NFR BLD: 23 % (ref 21–52)
MCH RBC QN AUTO: 32.1 PG (ref 24–34)
MCHC RBC AUTO-ENTMCNC: 33.7 G/DL (ref 31–37)
MCV RBC AUTO: 95.3 FL (ref 78–100)
MONOCYTES # BLD: 0.5 K/UL (ref 0.05–1.2)
MONOCYTES NFR BLD: 15 % (ref 3–10)
NEUTS SEG # BLD: 2.1 K/UL (ref 1.8–8)
NEUTS SEG NFR BLD: 60 % (ref 40–73)
NRBC # BLD: 0 K/UL (ref 0–0.01)
NRBC BLD-RTO: 0 PER 100 WBC
P-R INTERVAL, ECG05: 198 MS
PLATELET # BLD AUTO: 138 K/UL (ref 135–420)
PMV BLD AUTO: 10.6 FL (ref 9.2–11.8)
POTASSIUM SERPL-SCNC: 3.9 MMOL/L (ref 3.5–5.5)
Q-T INTERVAL, ECG07: 336 MS
QRS DURATION, ECG06: 80 MS
QTC CALCULATION (BEZET), ECG08: 420 MS
RBC # BLD AUTO: 4.3 M/UL (ref 4.2–5.3)
SODIUM SERPL-SCNC: 135 MMOL/L (ref 136–145)
VENTRICULAR RATE, ECG03: 94 BPM
WBC # BLD AUTO: 3.5 K/UL (ref 4.6–13.2)

## 2022-01-28 PROCEDURE — 74011250636 HC RX REV CODE- 250/636: Performed by: STUDENT IN AN ORGANIZED HEALTH CARE EDUCATION/TRAINING PROGRAM

## 2022-01-28 PROCEDURE — 36415 COLL VENOUS BLD VENIPUNCTURE: CPT

## 2022-01-28 PROCEDURE — 80048 BASIC METABOLIC PNL TOTAL CA: CPT

## 2022-01-28 PROCEDURE — 74011000258 HC RX REV CODE- 258: Performed by: STUDENT IN AN ORGANIZED HEALTH CARE EDUCATION/TRAINING PROGRAM

## 2022-01-28 PROCEDURE — 65270000029 HC RM PRIVATE

## 2022-01-28 PROCEDURE — 74011250637 HC RX REV CODE- 250/637: Performed by: STUDENT IN AN ORGANIZED HEALTH CARE EDUCATION/TRAINING PROGRAM

## 2022-01-28 PROCEDURE — 74011000250 HC RX REV CODE- 250: Performed by: STUDENT IN AN ORGANIZED HEALTH CARE EDUCATION/TRAINING PROGRAM

## 2022-01-28 PROCEDURE — 85025 COMPLETE CBC W/AUTO DIFF WBC: CPT

## 2022-01-28 RX ORDER — HYDROCODONE BITARTRATE AND ACETAMINOPHEN 10; 325 MG/1; MG/1
1 TABLET ORAL
Status: DISCONTINUED | OUTPATIENT
Start: 2022-01-28 | End: 2022-01-30 | Stop reason: HOSPADM

## 2022-01-28 RX ORDER — SENNOSIDES 8.6 MG/1
1 TABLET ORAL DAILY
Status: DISCONTINUED | OUTPATIENT
Start: 2022-01-28 | End: 2022-01-30 | Stop reason: HOSPADM

## 2022-01-28 RX ORDER — POLYETHYLENE GLYCOL 3350 17 G/17G
17 POWDER, FOR SOLUTION ORAL DAILY
Status: DISCONTINUED | OUTPATIENT
Start: 2022-01-28 | End: 2022-01-30 | Stop reason: HOSPADM

## 2022-01-28 RX ADMIN — ALPRAZOLAM 0.25 MG: 0.25 TABLET ORAL at 10:22

## 2022-01-28 RX ADMIN — TRAZODONE HYDROCHLORIDE 100 MG: 50 TABLET ORAL at 21:15

## 2022-01-28 RX ADMIN — CARBIDOPA AND LEVODOPA 0.5 TABLET: 25; 100 TABLET ORAL at 09:30

## 2022-01-28 RX ADMIN — HEPARIN SODIUM 5000 UNITS: 5000 INJECTION, SOLUTION INTRAVENOUS; SUBCUTANEOUS at 09:30

## 2022-01-28 RX ADMIN — HEPARIN SODIUM 5000 UNITS: 5000 INJECTION, SOLUTION INTRAVENOUS; SUBCUTANEOUS at 17:06

## 2022-01-28 RX ADMIN — SODIUM CHLORIDE, PRESERVATIVE FREE 10 ML: 5 INJECTION INTRAVENOUS at 21:23

## 2022-01-28 RX ADMIN — PIPERACILLIN AND TAZOBACTAM 3.38 G: 3; .375 INJECTION, POWDER, LYOPHILIZED, FOR SOLUTION INTRAVENOUS at 05:02

## 2022-01-28 RX ADMIN — HYDROCODONE BITARTRATE AND ACETAMINOPHEN 1 TABLET: 10; 325 TABLET ORAL at 05:02

## 2022-01-28 RX ADMIN — ATORVASTATIN CALCIUM 10 MG: 10 TABLET, FILM COATED ORAL at 09:31

## 2022-01-28 RX ADMIN — PIPERACILLIN AND TAZOBACTAM 3.38 G: 3; .375 INJECTION, POWDER, LYOPHILIZED, FOR SOLUTION INTRAVENOUS at 12:55

## 2022-01-28 RX ADMIN — CARBIDOPA AND LEVODOPA 0.5 TABLET: 25; 100 TABLET ORAL at 17:06

## 2022-01-28 RX ADMIN — SODIUM CHLORIDE, SODIUM LACTATE, POTASSIUM CHLORIDE, AND CALCIUM CHLORIDE 1000 ML: 600; 310; 30; 20 INJECTION, SOLUTION INTRAVENOUS at 09:28

## 2022-01-28 RX ADMIN — SODIUM CHLORIDE, PRESERVATIVE FREE 10 ML: 5 INJECTION INTRAVENOUS at 05:03

## 2022-01-28 RX ADMIN — SENNOSIDES 8.6 MG: 8.6 TABLET, COATED ORAL at 09:30

## 2022-01-28 RX ADMIN — POLYETHYLENE GLYCOL 3350 17 G: 17 POWDER, FOR SOLUTION ORAL at 09:31

## 2022-01-28 RX ADMIN — PIPERACILLIN AND TAZOBACTAM 3.38 G: 3; .375 INJECTION, POWDER, LYOPHILIZED, FOR SOLUTION INTRAVENOUS at 21:29

## 2022-01-28 RX ADMIN — ACETAMINOPHEN 650 MG: 325 TABLET ORAL at 02:21

## 2022-01-28 RX ADMIN — HYDROCODONE BITARTRATE AND ACETAMINOPHEN 1 TABLET: 10; 325 TABLET ORAL at 17:07

## 2022-01-28 RX ADMIN — Medication 2000 UNITS: at 09:29

## 2022-01-28 RX ADMIN — HYDROCODONE BITARTRATE AND ACETAMINOPHEN 1 TABLET: 10; 325 TABLET ORAL at 23:55

## 2022-01-28 RX ADMIN — ALPRAZOLAM 0.25 MG: 0.25 TABLET ORAL at 21:27

## 2022-01-28 RX ADMIN — HYDROCODONE BITARTRATE AND ACETAMINOPHEN 1 TABLET: 10; 325 TABLET ORAL at 11:34

## 2022-01-28 RX ADMIN — BUPROPION HYDROCHLORIDE 300 MG: 300 TABLET, FILM COATED, EXTENDED RELEASE ORAL at 06:45

## 2022-01-28 RX ADMIN — CARBIDOPA AND LEVODOPA 0.5 TABLET: 25; 100 TABLET ORAL at 21:14

## 2022-01-28 RX ADMIN — HEPARIN SODIUM 5000 UNITS: 5000 INJECTION, SOLUTION INTRAVENOUS; SUBCUTANEOUS at 23:55

## 2022-01-28 NOTE — ED NOTES
Patient moved to ED bed 18. She is awake, alert and oriented x 4. I spoke with her daughter Marty Plasencia patient is requesting medication at present. Call bell was used and unsuccessful. I placed bedside table in room, plugged in her phone and tablet to . She has fluids at bedside. Patient given medication for pain and anxiety per her request. Rates pain 8/10 to neck and back. Patient skin warm and dry. Sinus without ectopy to cardiac monitor. Respirations even and unlabored. Patient has desai catheter intact that is draining to gravity without problems. She is Covid 19 positive, and room is negative pressure. I explained the loud noise of generator. I oriented her to TV, remote control and call bell. Call bell checked at this time et appears to be functioning well. Patient appears calm after RN done with assessment. Will continue to monitor closely.

## 2022-01-28 NOTE — PROGRESS NOTES
Discharge/Transition Planning     aware of [place] and [facility] orders and sending out.  [facility] is not working on virtual .

## 2022-01-28 NOTE — PROGRESS NOTES
Intern Progress Note  [address] [facility]       Patient: Debby Lowery MRN: [id number]  CSN: [id number]    YOB: 1940  Age: 80 y.o. Sex: female    DOA: 1/26/2022 LOS:  LOS: 2 days                    Subjective:      Patient seen and examined at bedside this morning. She is awake and alert, appears comfortable. She was evaluated by Neurology yesterday and was started on Sinemet for Parkinson's. Patient notes that she has been told she had Parkinson's in the past, tremor in arms is intermittent. She does not currently feel any increased weakness    Patient also had a positive COVID test yesterday. She describes an occasional cough, during which she experiences a brief moment of sharp abdominal pain. Pain otherwise not present at rest. She also endorses a sore throat that has been making it difficult for her to eat or drink. Denies chest pain, shortness of breath, abdominal pain, dysuria. Denies fever/chills, n/v. She does have constipation and has not had a bowel movement since since arrival to the ED. Review of Systems   Constitutional: Negative for chills, fever and malaise/fatigue. Respiratory: Negative for shortness of breath. Cardiovascular: Negative for chest pain and leg swelling. Gastrointestinal: Negative for abdominal pain and nausea. Genitourinary: Negative for dysuria. Skin: Negative for rash. Neurological: Negative for headaches.        Objective:     Patient Vitals for the past 24 hrs:   Temp Pulse Resp BP SpO2   01/28/22 0400 97.8 °F (36.6 °C) 83 18 (!) 154/80 95 %   01/28/22 0000 97.9 °F (36.6 °C) 77 18 109/66 94 %   01/27/22 1800 97.9 °F (36.6 °C) (!) 102 16 118/76 98 %   01/27/22 1700 98.9 °F (37.2 °C) 88 20 (!) 93/53 --   01/27/22 1300 -- (!) 101 15 106/66 --   01/27/22 1200 -- 98 21 (!) 94/55 --   01/27/22 0930 -- (!) 106 24 131/65 --         Intake/Output Summary (Last 24 hours) at 1/28/2022 0700  Last data filed at 1/27/2022 1955  Gross per 24 hour   Intake --   Output 1100 ml   Net -1100 ml       Physical Exam:   General:  No acute distress, comfortable-appearing and awake. HEENT: Conjunctiva pink, sclera anicteric. EOMI. No oral exudates or significant erythema. CV:  RRR, no M/G/R appreciated. No visible pulsations or thrills. RESP:  Unlabored breathing. Lungs CTAB, no wheezes, rales or rhonchi appreciated. Equal expansion bilaterally. ABD:  Soft, nondistended, general tenderness to palpation (patient unable to identify location of pain), unchanged. BS (+). No guarding. Neuro:   5/5 strength bilateral upper extremities 4/5 bilateral lower extremities with R >L. Ext:  No edema. 2+ radial pulses bilaterally.   Skin:  No rashes, lesions, or ulcers appreciated  Psych: normal mood and affect     Lab/Data Reviewed:  Recent Results (from the past 24 hour(s))   PROCALCITONIN    Collection Time: 01/27/22  7:52 PM   Result Value Ref Range    Procalcitonin 0.06 ng/mL   FERRITIN    Collection Time: 01/27/22  7:52 PM   Result Value Ref Range    Ferritin 209 8 - 388 NG/ML   D DIMER    Collection Time: 01/27/22  7:52 PM   Result Value Ref Range    D DIMER 1.02 (H) <0.46 ug/ml(FEU)   METABOLIC PANEL, BASIC    Collection Time: 01/27/22  7:52 PM   Result Value Ref Range    Sodium 136 136 - 145 mmol/L    Potassium 4.0 3.5 - 5.5 mmol/L    Chloride 102 100 - 111 mmol/L    CO2 27 21 - 32 mmol/L    Anion gap 7 3.0 - 18 mmol/L    Glucose 97 74 - 99 mg/dL    BUN 24 (H) 7.0 - 18 MG/DL    Creatinine 1.09 0.6 - 1.3 MG/DL    BUN/Creatinine ratio 22 (H) 12 - 20      GFR est AA 58 (L) >60 ml/min/1.73m2    GFR est non-AA 48 (L) >60 ml/min/1.73m2    Calcium 9.4 8.5 - 10.1 MG/DL   SED RATE (ESR)    Collection Time: 01/27/22  7:52 PM   Result Value Ref Range    Sed rate, automated 14 0 - 30 mm/hr   METABOLIC PANEL, BASIC    Collection Time: 01/28/22  4:41 AM   Result Value Ref Range    Sodium 135 (L) 136 - 145 mmol/L    Potassium 3.9 3.5 - 5.5 mmol/L    Chloride 104 100 - 111 mmol/L    CO2 23 21 - 32 mmol/L    Anion gap 8 3.0 - 18 mmol/L    Glucose 80 74 - 99 mg/dL    BUN 23 (H) 7.0 - 18 MG/DL    Creatinine 0.90 0.6 - 1.3 MG/DL    BUN/Creatinine ratio 26 (H) 12 - 20      GFR est AA >60 >60 ml/min/1.73m2    GFR est non-AA >60 >60 ml/min/1.73m2    Calcium 8.9 8.5 - 10.1 MG/DL       Assessment & Plan:     80 y.o. female with PMH depression, anxiety, diverticulitis, osteoarthritis, HLD, HTN, GERD, neurofibromatosis, chronic pain, now admitted for UTI and lower extremity weakness.     Urinary Retention, LE weakness   - Patient presented with urinary retention in the setting of worsening bilateral hip/lower back pain, LE weakness and worsening mobility. Patient's  reported to ED that patient complained of urinary retention morning of 1/26. Bladder scan in ED revealed ~500ml urine retained and a desai was subsequently placed. Patient's  reported to ED that when patient tried to stand from chair on 1/26, she slowly slid to floor. Patient denied urinary or fecal incontinence or saddle anesthesia. On exam, no point tenderness to lower back, strength 4/5 bilateral LE with R stronger than L.  ED reported patient unable to bear weight initially. - Patient has history of neurofibromatosis. Last seen by neurosurgery 10/21 noted stable cervical spine MRI with known nerve sheath tumors around cervical cord with no active compression or cord signal change. 10/21 visit also noted that patient reported gait imbalance and using a wheelchair. Patient currently reporting using a walker to ambulate at home. - Feverish (100.6F) patient with no lower back point tenderness and no known history of IV drug use, low suspicion for spinal abscess. - MRI 1/26: Cervical spine: Motion artifact significantly limits evaluation. Suboccipital through C4 posterior hardware fixation as above. Associated artifact further limits evaluation.  Mild to moderate spinal canal and foraminal stenoses at C4-C7 levels. Multilevel mild disc herniations. Left greater than right notable facet arthropathy at C4-C7 levels. Thoracic spine: No critical spinal canal stenosis at any level. Mild to moderate foraminal stenoses at T5-T6 and T6-T7. Anterior T10 body ovoid enhancing focus, indeterminate, possibly atypical hemangioma, without definite correlate on CT. Consider follow-up thoracic spine MRI in 3-6 months to reassess, if warranted. Lumbar spine: No critical spinal canal stenosis at any level. Multifocal mild to moderate foraminal stenoses, most notable at L1-L2 and L5-S1. Dextrocurvature apex L3. Multilevel mild disc herniations and mild to moderate facet arthropathy. - Evaluated by Neurology yesterday, thought to have symptoms of Parkinson's (patient noted being told this in the past); started on Sinemet  PLAN:  - PT/OT for strengthening exercises  - Neurology following, appreciate assistance  - Strength does appear to have improve, patient states at her baseline  - Continue desai for urinary retention and voiding trial prior to discharge  - Daily CBC, CMP, Mg, Phos     UTI w/ urinary retention   - Patient febrile to 100.6 on arrival.  WBC wnl. Did not meet sepsis criteria. UA positive for small blood, nitrites, and 2+ bacteria. Urine culture sent. Zosyn started. General tenderness to abdominal palpation on exam.   - Urine culture showing gram negative rods  PLAN:  - Continue zosyn   - FU urine culture speciation  - FU blood cultures x 2, NGTD  - Low PO intake due to sore throat, given a bolus of LR     Diverticulitis   - Patient has known history of diverticulitis, CT abdomen/pelvis 1/25 revealed scattered diverticula at colon consistent with mild diverticulitis. Patient febrile to 100.6 with general tenderness to abdominal palpation. Patient unsure of date of last BM. Previously started on Cipro and Flagyl in ED (however, Flagyl is currently on national back order per pharm).    - Suspect abdominal discomfort with cough is related to constipation, having not had a bowel movement in a week  PLAN:  - Scheduled Miralax and Senna  - Continue zosyn      COVID   - Elderly patient presenting with isolated fever. Cause for fever multifactorial including ?urosepsis from underlying UTI vs diverticulitis, vs COVID. Patient COVID vaccinated x2 last dose 3/10, due for booster dose. - Positive test 1/27; patient endorsing cough and sore throat   - Patient is not requiring supplemental oxygen at this time  PLAN:  - Continue to trend COVID inflammatory markers as needed  - Chloraseptic for sore throat  - D-dimer elevated to 1.02 though Wells score of zero, low risk for PE and DVT     Chronic Pain, DJD, multiple joints   - Patient initially complaining of increased lower back pain. On exam no point tenderness of lumbar spine, but signifcant tenderness to general palpation of bilateral hips and L foot with no significant MSK abnormalities on exam.  CT abdomen/pelvis did not note any acute abnormalities at skeleton. Medications per Dr Kalli Browning note 11/2021 report norco and meloxicam.  Patient reported taking norco for pain.   PLAN:  - continue norco  q6h PRN  - lidocaine patches PRN     Parkinson's  - Known history, right UE low-frequency tremor  - Possibly contributing to presentation with LE weakness  PLAN:  - Continue Sinemet  - Neurology following, appreciate assistance    Depression/Anxiety  - home bupropion XL 300mg daily  - home trazodone 100mg daily at bedtime  - home xanax 0.25mg BID PRN     HTN   - BP elevated to 150/77 on arrival, currently 154/80   - Home amlodipine 5mg daily  PLAN:  - Monitor BP per unit protocol, can re-add home medication though some Bps have been soft     Hyperlipidemia  - Continue home atorvastatin 10mg     Global Care:  - VS per unit routine  - supplemental O2 for sats < 92%  - incentive spirometer  - PT/OT/CM     Diet  Adult regular   DVT Prophylaxis  SQH   GI Prophylaxis   -    Code status  DNR Disposition  TBD      Point of Contact [place]  Relationship:   [phone]           Jerrod Mcfarlane DO , PGY-1   [facility]   January 28, 2022, 8:32 AM

## 2022-01-28 NOTE — DISCHARGE SUMMARY
Discharge Summary  [facility]      Patient: Rosi Palmer MRN: [id number]  CSN: [id number]    YOB: 1940  Age: 80 y.o. Sex: female      Admission Date: 1/26/2022 Discharge Date: 1/30/2022   Attending: Marcella Pool MD PCP: Lacho Moss MD     ===================================================================    Reason for Admission:   Limited mobility [Z74.09]  Weakness [R53.1]  UTI (urinary tract infection) [N39.0]  Acute diverticulitis [K57.92]  Urinary retention [R33.9]    Discharge Diagnoses:   Parkinson's disease  Urinary tract infection  Urinary retention  Acute diverticulitis  Hypertension  Osteoarthritis  Neurofibromatosis    Important notes to PCP/ follow-up studies and evaluations    - Mrs. Dali Fritz was started on Sinemet 1/27 due to concerns for Parkinson's disease, known family history and questionably known personal history   - Please ensure follow up with Neurology regarding Parkinson's diagnosis and initiation of Sinemet; evaluated by Dr. Anny Thorpe during hospitalization   - Patient completed course of antibiotics for UTI during this hospitalization  - Consider Movantik for opiod-induced constipation, regular use of Miralax. Pending labs and studies:   - Blood cultures, NG at 4 days    Operative Procedures:   None    Discharge Medications:     Current Discharge Medication List      START taking these medications    Details   carbidopa-levodopa (SINEMET)  mg per tablet Take 0.5 Tablets by mouth three (3) times daily for 30 days. Qty: 45 Tablet, Refills: 0  Start date: 1/29/2022, End date: 2/28/2022      !! polyethylene glycol (Miralax) 17 gram packet Take 1 Packet by mouth two (2) times a day. Qty: 60 Packet, Refills: 0  Start date: 1/29/2022       !! - Potential duplicate medications found. Please discuss with provider.       CONTINUE these medications which have NOT CHANGED    Details   cholecalciferol (VITAMIN D3) 1,000 unit cap Take 2,000 Units by mouth daily. HYDROcodone-acetaminophen (NORCO)  mg tablet hydrocodone 10 mg-acetaminophen 325 mg tablet      amLODIPine (NORVASC) 5 mg tablet Take 5 mg by mouth daily. buPROPion XL (WELLBUTRIN XL) 150 mg tablet Take 300 mg by mouth every morning. ALPRAZolam (XANAX) 1 mg tablet Take 0.25 mg by mouth two (2) times daily as needed. Associated Diagnoses: Rheumatoid arthritis(714.0); Osteoarthrosis, unspecified whether generalized or localized, unspecified site; Pain in limb; Myalgia and myositis, unspecified; GERD (gastroesophageal reflux disease); Constipation due to pain medication; Osteoporosis, unspecified; Headache(784.0); Migraine, unspecified, without mention of intractable migraine without mention of status migrainosus; Hx of breast cancer; Chronic pain syndrome; Insomnia secondary to chronic pain; Encounter for long-term (current) use of other medications; Pain in joint, multiple sites      traZODone (DESYREL) 50 mg tablet Take 100 mg by mouth nightly. Indications: difficulty sleeping      ! ! polyethylene glycol (MIRALAX) 17 gram packet Take 1 Packet by mouth daily. Qty: 10 Packet, Refills: 1      atorvastatin (LIPITOR) 10 mg tablet Take  by mouth daily. Denosumab (PROLIA) 60 mg/mL injection 60 mg by SubCUTAneous route. diltiazem hcl (CARDIZEM) 120 mg tablet Take 120 mg by mouth daily. !! - Potential duplicate medications found. Please discuss with provider.       STOP taking these medications       oxyCODONE-acetaminophen (PERCOCET) 7.5-325 mg per tablet Comments:   Reason for Stopping:         brexpiprazole (REXULTI) 1 mg tab tablet Comments:   Reason for Stopping:         tiZANidine (ZANAFLEX) 2 mg capsule Comments:   Reason for Stopping:         docusate calcium (SURFAK) 240 mg capsule Comments:   Reason for Stopping:         esomeprazole (NEXIUM) 20 mg capsule Comments:   Reason for Stopping:         oxyMORphone 5 mg PO ER tablet Comments:   Reason for Stopping:         senna-docusate (PERICOLACE) 8.6-50 mg per tablet Comments:   Reason for Stopping:         triamcinolone acetonide (KENALOG) 0.1 % ointment Comments:   Reason for Stopping:         promethazine (PHENERGAN) 25 mg tablet Comments:   Reason for Stopping:         Calcium-Cholecalciferol, D3, 600 mg(1,500mg) -400 unit cap Comments:   Reason for Stopping:         lurasidone (LATUDA) 20 mg tab tablet Comments:   Reason for Stopping:         ondansetron (ZOFRAN ODT) 4 mg disintegrating tablet Comments:   Reason for Stopping:               Disposition: Home with Home Health    Consultants:    Neurology    [address] [place] [address] Rd Course (including pertinent history and physical findings)  Bart Zhou is an 80 y.o. female with depression, anxiety, diverticulosis, osteoarthritis, HLD, HTN, GERD, and neurofibromatosis who [facility] with concern for difficulty urinating and lower back pain. Per ED report, her  stated that Mrs. Júnior Andrade was unable to bear weight in her bilateral lower extremities and had been witnessed to have slid down from the couch when attempting to stand up. On arrival to the ED, patient had a single fever of 100. 6F without associated tachycardia, tachypnea, or hypotension. Urinalysis was positive for bacteria, nitrites, and small amount of blood. A bladder scan revealed ~500 ml cc of urine and a Aly catheter was placed. Blood and urine cultures were obtained and she was started on IV Zosyn. In the ED, CT head did not show any acute findings. CT C-spine showed patient's known degenerative disc disease and post-surgical changes. Given report of bilateral lower extremity weakness, further imaging was obtained with MRI cervical, thoracic, and lumbar spines. These images were reviewed by Neurology and were not suspected to have contributed to Mrs. Rosen's acute development of weakness.     CT abd/pelvis was also obtained and revealed mild haziness around the distal left colon with underlying diverticula compatible with mild diverticulitis. It also showed peripherally calcified hepatic cyst.and atelectasis vs possible pneumonitis. The ED initially prescribed ciprofloxacin and IV Flagyl, though there is currently a national backorder of IV Flagyl. However, patient's clinical picture appeared to be more consistent with UTI. She did later endorse abdominal pain, though this was less consistent with diverticulitis and more consistent with severe constipation. Patient's symptoms were relieved with manual disimpaction after having failed to have a bowel movement with enemas. Mrs. Roderick Larson also endorsed a sore throat and an occasional cough. A respiratory virus panel was obtained and returned positive for COVID. Patient endorsed having been vaccinated x 2 without a booster. Inflammatory markers were significant for a D-dimer of 1.02 though her Well's criteria indicated that she was low risk for DVT and PVL and CT chest were not obtained. By the time of [address]'s initial evaluation, Mrs. Roderick Larson reported being back at her baseline strength and physical examination revealed 4/5 strength in the lower extremities. She was noted to have a right upper extremity resting tremor, low frequency. Neurology was consulted given acute onset of weakness and significant history of spine surgeries, concern for urinary retention. Imaging was not consistent with cauda equina or conus syndrome. Her gait difficulty was considered multifactorial given history of chronic pain and suspected idiopathic Parkinson's disease (noted cogwheel rigidity on examination, resting tremor). Patient was started on low-dose Sinemet for her symptoms and suspected Parkinson's disease. On the date of discharge, patient had not signs or symptoms of sepsis. She was afebrile with stable vital signs. Her lower extremity weakness was at baseline, per patient.  Regarding the UTI, urine culture grew E. coli that was pan-sensitive to antibiotics. She had already received 5 days of Zosyn, considered adequate treatment. Patient did not require any antibiotics at the time of discharge. Management of patient's health issues during this hospitalization are described in more detail below:    Diverticulitis, abdominal discomfort   - Patient has known history of diverticulitis, CT abdomen/pelvis 1/25/22 revealed scattered diverticula at colon consistent with mild diverticulitis.  Patient febrile to 100.6 with general tenderness to abdominal palpation. She was started on Zosyn for UTI and mild diverticulitis (IV Flagyl on national backorder), but abdominal discomfort was suspected to be more likely related to constipation given symptoms only with cough and patient having not had a bowel movement in a week. She was placed on scheduled bowel regiment with Miralax and Senna, but remained unable to have a bowel movement. She later received two enemas, also without relief. The night prior to discharge, Mrs. Saul Williamson had a successful manual disimpaction and felt significant relief with good output.    - Suspect patient has chronic opioid-induced constipation. Recommend regular bowel regimen and reduced dose of narcotics for pain.     COVID   - Elderly patient presenting with isolated fever. Cause for fever multifactorial including urosepsis from underlying UTI vs diverticulitis, vs COVID. Patient COVID vaccinated x2 last dose 3/10, due for booster dose. Given sore throat and cough, she was tested for COVID at the time of admission. Her test returned positive on 1/27. Patient did not require supplemental oxygen during this admission. She continued to endorse cough and was given chloraseptic spray for sore throat. Inflammatory markers were obtained, with only only D-dimer being elevated. However, Mrs. Hardy Redo score was zero, low risk for PE and DVT.  Her vital signs and symptoms were monitored closely and further work up was not necessary. Chronic Pain, DJD, multiple joints   - Patient initially complaining of increased lower back pain.  On exam no point tenderness of lumbar spine, but signifcant tenderness to general palpation of bilateral hips and L foot with no significant MSK abnormalities on exam. CT abdomen/pelvis did not note any acute skeletal abnormalities.  Patient reported taking Norco 10/325 Q4-6H prn pain. Thhis medication was continued at [place]  prn due to concern for constipation. She was also provided lidoderm patches prn. Recommend minimizing the use of Norco as an outpatient given chronic constipation.     Parkinson's  - Patient with intermittent right upper extremity tremor. Patient did note that she has a family history of Parkinson's and was told this was a possible diagnosis for her previously. During this admission, she was evaluated by Neurology whom suspected Parkinson's disease may have been a concern for her presentation with lower extremity weakness. Recommend continued use of Sinemet and follow up with Neurology as an outpatient (Dr. Cloud File). Depression/Anxiety  - Home bupropion XL 300mg daily, trazodone 100mg daily at bedtime, and xanax 0.25mg BID PRN were continued.     HTN   - Patient's home amlodipine was held during this admission due to soft blood pressure readings. May continue this medication as an outpatient.      Hyperlipidemia  - Home atorvastatin 10 mg was continued. CURRENT ADMISSION IMAGING RESULTS   MRI CERV SPINE W WO CONT    Result Date: 1/27/2022  Cervical spine: -Motion artifact significantly limits evaluation. -Suboccipital through C4 posterior hardware fixation as above. Associated artifact further limits evaluation. -Mild to moderate spinal canal and foraminal stenoses at C4-C7 levels. Multilevel mild disc herniations. Left greater than right notable facet arthropathy at C4-C7 levels.  Thoracic spine: -No critical spinal canal stenosis at any level. Mild to moderate foraminal stenoses at T5-T6 and T6-T7. -Anterior T10 body ovoid enhancing focus, indeterminate, possibly atypical hemangioma, without definite correlate on CT. Consider follow-up thoracic spine MRI in 3-6 months to reassess, if warranted. Lumbar spine: -No critical spinal canal stenosis at any level. Multifocal mild to moderate foraminal stenoses, most notable at L1-L2 and L5-S1. -Dextrocurvature apex L3. Multilevel mild disc herniations and mild to moderate facet arthropathy. -Incidental findings as above. Motion artifact limits evaluation. See additional details above. Preliminary report provided by Dr. Tash King at [employer], 1/26/2022. MRI [facility] SPINE W WO CONT    Result Date: 1/27/2022  Cervical spine: -Motion artifact significantly limits evaluation. -Suboccipital through C4 posterior hardware fixation as above. Associated artifact further limits evaluation. -Mild to moderate spinal canal and foraminal stenoses at C4-C7 levels. Multilevel mild disc herniations. Left greater than right notable facet arthropathy at C4-C7 levels. Thoracic spine: -No critical spinal canal stenosis at any level. Mild to moderate foraminal stenoses at T5-T6 and T6-T7. -Anterior T10 body ovoid enhancing focus, indeterminate, possibly atypical hemangioma, without definite correlate on CT. Consider follow-up thoracic spine MRI in 3-6 months to reassess, if warranted. Lumbar spine: -No critical spinal canal stenosis at any level. Multifocal mild to moderate foraminal stenoses, most notable at L1-L2 and L5-S1. -Dextrocurvature apex L3. Multilevel mild disc herniations and mild to moderate facet arthropathy. -Incidental findings as above. Motion artifact limits evaluation. See additional details above. Preliminary report provided by Dr. Tash King at [employer], 1/26/2022.      MRI LUMB SPINE W WO CONT    Result Date: 1/27/2022  Cervical spine: -Motion artifact significantly limits evaluation. -Suboccipital through C4 posterior hardware fixation as above. Associated artifact further limits evaluation. -Mild to moderate spinal canal and foraminal stenoses at C4-C7 levels. Multilevel mild disc herniations. Left greater than right notable facet arthropathy at C4-C7 levels. Thoracic spine: -No critical spinal canal stenosis at any level. Mild to moderate foraminal stenoses at T5-T6 and T6-T7. -Anterior T10 body ovoid enhancing focus, indeterminate, possibly atypical hemangioma, without definite correlate on CT. Consider follow-up thoracic spine MRI in 3-6 months to reassess, if warranted. Lumbar spine: -No critical spinal canal stenosis at any level. Multifocal mild to moderate foraminal stenoses, most notable at L1-L2 and L5-S1. -Dextrocurvature apex L3. Multilevel mild disc herniations and mild to moderate facet arthropathy. -Incidental findings as above. Motion artifact limits evaluation. See additional details above. Preliminary report provided by Dr. Marcello Daily at [address] [place] [address], 1/26/2022. CT HEAD WO CONT    Result Date: 1/26/2022  No acute findings. Mild sequela of chronic microvascular ischemic disease and mild generalized volume loss. CT SPINE CERV WO CONT    Result Date: 1/26/2022  Interval healing of dens fracture status post fixation. No acute findings of trauma. Severe degenerative disc disease with worsened likely related minimal subluxation vertebral bodies. CT ABD PELV W CONT    Result Date: 1/26/2022  Mild haziness around the distal left colon with underlying diverticula compatible with mild diverticulitis. Renal cysts. Peripherally calcified hepatic cyst. Bladder not well evaluated as is collapsed around a Aly catheter balloon. Density left base that likely atelectasis. Cannot exclude small focus of pneumonitis. XR CHEST PORT    Result Date: 1/26/2022  : 1. Mild vascular congestion and edema. No focal infiltrates. 2. Aortic atherosclerosis.         Cardiology Procedures/Testing:  MODALITY RESULTS   EKG Results for orders placed or performed during the hospital encounter of 01/26/22   EKG, 12 LEAD, INITIAL   Result Value Ref Range    Ventricular Rate 94 BPM    Atrial Rate 94 BPM    P-R Interval 198 ms    QRS Duration 80 ms    Q-T Interval 336 ms    QTC Calculation (Bezet) 420 ms    Calculated P Axis 66 degrees    Calculated R Axis 21 degrees    Calculated T Axis 54 degrees    Diagnosis       Normal sinus rhythm  Nonspecific T wave abnormality  Abnormal ECG  When compared with ECG of 31-JAN-2017 15:51,  premature ventricular complexes are no longer present  Criteria for Septal infarct are no longer present  Confirmed by Viktor Turner MD, ----- ([id number]) on 1/28/2022 8:59:15 AM         ECHO    IR No results found for this or any previous visit.      CATH      Special Testing/Procedures:  MODALITY RESULTS   MICRO All Micro Results     Procedure Component Value Units Date/Time    CULTURE, BLOOD [[id number]] Collected: 01/26/22 1150    Order Status: Completed Specimen: Blood Updated: 01/30/22 0651     Special Requests: NO SPECIAL REQUESTS        Culture result: NO GROWTH 4 DAYS       CULTURE, BLOOD [[id number]] Collected: 01/26/22 1220    Order Status: Completed Specimen: Blood Updated: 01/30/22 0651     Special Requests: NO SPECIAL REQUESTS        Culture result: NO GROWTH 4 DAYS       CULTURE, URINE [[id number]]  (Abnormal)  (Susceptibility) Collected: 01/26/22 1915    Order Status: Completed Specimen: Cath Urine Updated: 01/29/22 1417     Special Requests: NO SPECIAL REQUESTS        [place] Count --        >100,000  COLONIES/mL       Culture result: ESCHERICHIA COLI       RESPIRATORY VIRUS PANEL W/COVID-19, PCR [[id number]]  (Abnormal) Collected: 01/26/22 1006    Order Status: Completed Specimen: Nasopharyngeal Updated: 01/27/22 1136     Adenovirus Not detected        Coronavirus 229E Not detected        Coronavirus HKU1 Not detected        Coronavirus CVNL63 Not detected        Coronavirus OC43 Not detected SARS-CoV-2, PCR Detected        Comment: CALLED TO AND CORRECTLY REPEATED BY:  DR DORITA CHOWDHURY ER ON [id number] AT 1135 HRS TO 1396. Metapneumovirus Not detected        Rhinovirus and Enterovirus Not detected        Influenza A Not detected        Influenza A, subtype H1 Not detected        Influenza A, subtype H3 Not detected        INFLUENZA A H1N1 PCR Not detected        Influenza B Not detected        Parainfluenza 1 Not detected        Parainfluenza 2 Not detected        Parainfluenza 3 Not detected        Parainfluenza virus 4 Not detected        RSV by PCR Not detected        B. parapertussis, PCR Not detected        Bordetella pertussis - PCR Not detected        Chlamydophila pneumoniae DNA, QL, PCR Not detected        Mycoplasma pneumoniae DNA, QL, PCR Not detected       COVID-19 RAPID TEST [[id number]]     Order Status: Canceled          ABG Lab Results   Component Value Date/Time    pH (POC) 7.459 (H) 03/21/2012 08:40 AM    pCO2 (POC) 39.2 03/21/2012 08:40 AM    pO2 (POC) 105 (H) 03/21/2012 08:40 AM    HCO3 (POC) 27.8 (H) 03/21/2012 08:40 AM    FIO2 (POC) 40 03/21/2012 08:40 AM      UA No results found for this or any previous visit.      Laboratory Results:  LABORATORY RESULTS   HEMATOLOGY Lab Results   Component Value Date/Time    WBC 3.4 (L) 01/30/2022 04:53 AM    HGB 14.6 01/30/2022 04:53 AM    HCT 42.7 01/30/2022 04:53 AM    PLATELET 208 05/08/8098 04:53 AM    MCV 93.2 01/30/2022 04:53 AM       CHEMISTRIES Lab Results   Component Value Date/Time    Sodium 136 01/30/2022 04:53 AM    Potassium 3.7 01/30/2022 04:53 AM    Chloride 104 01/30/2022 04:53 AM    CO2 22 01/30/2022 04:53 AM    Anion gap 10 01/30/2022 04:53 AM    Glucose 85 01/30/2022 04:53 AM    BUN 12 01/30/2022 04:53 AM    Creatinine 0.78 01/30/2022 04:53 AM    BUN/Creatinine ratio 15 01/30/2022 04:53 AM    GFR est AA >60 01/30/2022 04:53 AM    GFR est non-AA >60 01/30/2022 04:53 AM    Calcium 9.3 01/30/2022 04:53 AM      HEPATIC FUNCTION Lab Results   Component Value Date/Time    Albumin 3.9 01/26/2022 11:50 AM    Bilirubin, direct 0.2 03/18/2012 02:55 PM    Bilirubin, total 0.7 01/26/2022 11:50 AM    Protein, total 7.5 01/26/2022 11:50 AM    Globulin 3.6 01/26/2022 11:50 AM    A-G Ratio 1.1 01/26/2022 11:50 AM    ALT (SGPT) 26 01/26/2022 11:50 AM    Alk. phosphatase 79 01/26/2022 11:50 AM       LACTIC ACID Lab Results   Component Value Date/Time    Lactic acid 1.1 03/18/2012 05:10 PM      CARDIAC PANEL Lab Results   Component Value Date/Time    CK 47 10/10/2015 12:09 PM    CK - MB <0.5 (L) 10/10/2015 12:09 PM    CK-MB Index CANNOT BE CALCULATED 10/10/2015 12:09 PM    Troponin-I, QT <0.02 10/10/2015 12:09 PM      NT-proBNP No results found for: BNP, BNPP, BNPPPOC, XBNPT, BNPNT   THYROID Lab Results   Component Value Date/Time    TSH 1.18 10/10/2015 12:09 PM        Functional status and cognitive function:    Ambulates with: Walker  Status: alert, cooperative, no distress, appears stated age  Condition: STABLE    Physical exam on day of discharge:    General:  No acute distress, comfortable-appearing and awake. HEENT: Conjunctiva pink, sclera anicteric. EOMI.  No oral exudates or significant erythema. CV:  RRR, no M/G/R appreciated. No visible pulsations or thrills.    RESP:  Unlabored breathing. Lungs CTAB, no wheezes, rales or rhonchi appreciated. Equal expansion bilaterally. ABD:  Soft, nondistended, nontender. BS (+).  No guarding.   Neuro:   5/5 strength bilateral upper extremities 4/5 bilateral lower extremities  Ext:  No edema.  2+ radial pulses bilaterally. Skin:  No rashes, lesions, or ulcers appreciated. Psych: AAOx2. Normal mood and affect. Code status and advanced care plan: Full    Point of Contact Joey Zelaya  [phone]      Patient Education:  Patient was educated on the following topics prior to discharge: Follow up with Neurology. Attempting to decrease Norco usage.  Importance of regular bowel regimen. RISK CALCULATORS:  SCORE RESULT   READMISSION RISK SCORE Low Risk            10 Total Score    5 Pt. Coverage (Medicare=5 , Medicaid, or Self-Pay=4)    5 Charlson Comorbidity Score (Age + Comorbid Conditions)        Criteria that do not apply:    Has Seen PCP in Last 6 Months (Yes=3, No=0)    . Living with Significant Other. Assisted Living. LTAC. SNF. or   Rehab    Patient Length of Stay (>5 days = 3)    IP Visits Last 12 Months (1-3=4, 4=9, >4=11)         Follow-up:   Follow-up Information     Follow up With Specialties Details Why Contact Info    Jesu Sue MD Family Medicine Call in 3 days Please call [address] as soon as possible to schedule a follow up appointment after your hospitalization. [address] [facility]      Lazarus Palmer, MD Neurology Call Please call Dr. Hannah Lozano, Neurology, at [phone] to schedule an appointment.  [address]  [phone]            =================================================================    Jerry Chance DO, PGY-1   [facility] Medicine   January 30, 2022, 1:08 PM

## 2022-01-28 NOTE — PROGRESS NOTES
TRANSFER - IN REPORT:    telephone report received from CORRINE Mike(name) on Vy Mas  being received from Ed(unit) for routine progression of care      Report consisted of patients Situation, Background, Assessment and   Recommendations(SBAR). Information from the following report(s) ED Summary, MAR and Recent Results was reviewed with the receiving nurse. Opportunity for questions and clarification was provided. Assessment completed upon patients arrival to unit and care assumed. 2345 received pt. Per stretcher, AOX4, transfer in bed well. Pt. Educated on call bell and room and equipments. Pt. Educated on MD's order. Pt. Verbalized understanding of educations. 0130  Pt. Resting with eyes closed, easily awaken. 0221  Pt. Complaining of headache pt. Given tylenol. 0330  Pt. Made no complaints.    0502  Pt. Given roxicodone for chronic pain. 0600  Pt. Resting comfortably, denies pain.    erbal and bedside Shift changed report given to Rex Fairbanks RN (oncoming RN) on Pt. Condition. Report consisted of patients Situation, History, Activities, intake/output,  Background, Assessment and Recommendations(SBAR). Information from the following report(s) Kardex, order Summary, Lab results and MAR was reviewed with the receiving nurse. Opportunity for questions and clarification was provided.

## 2022-01-28 NOTE — ED NOTES
[facility] SBAR and ED summary reported. Patient transported to room 216.

## 2022-01-28 NOTE — ED NOTES
I called patient daughterAllyn and left a voice message regarding patient code number for access to information. I also gave patient a piece of paper with her access code ( [id number] ) so she can provide to her family representative.

## 2022-01-29 LAB
ANION GAP SERPL CALC-SCNC: 5 MMOL/L (ref 3–18)
ANION GAP SERPL CALC-SCNC: 8 MMOL/L (ref 3–18)
BACTERIA SPEC CULT: ABNORMAL
BASOPHILS # BLD: 0 K/UL (ref 0–0.1)
BASOPHILS NFR BLD: 0 % (ref 0–2)
BUN SERPL-MCNC: 15 MG/DL (ref 7–18)
BUN SERPL-MCNC: 16 MG/DL (ref 7–18)
BUN/CREAT SERPL: 17 (ref 12–20)
BUN/CREAT SERPL: 18 (ref 12–20)
CALCIUM SERPL-MCNC: 8.3 MG/DL (ref 8.5–10.1)
CALCIUM SERPL-MCNC: 9.2 MG/DL (ref 8.5–10.1)
CC UR VC: ABNORMAL
CHLORIDE SERPL-SCNC: 104 MMOL/L (ref 100–111)
CHLORIDE SERPL-SCNC: 104 MMOL/L (ref 100–111)
CO2 SERPL-SCNC: 26 MMOL/L (ref 21–32)
CO2 SERPL-SCNC: 28 MMOL/L (ref 21–32)
CREAT SERPL-MCNC: 0.85 MG/DL (ref 0.6–1.3)
CREAT SERPL-MCNC: 0.93 MG/DL (ref 0.6–1.3)
DIFFERENTIAL METHOD BLD: ABNORMAL
EOSINOPHIL # BLD: 0.1 K/UL (ref 0–0.4)
EOSINOPHIL NFR BLD: 2 % (ref 0–5)
ERYTHROCYTE [DISTWIDTH] IN BLOOD BY AUTOMATED COUNT: 13.2 % (ref 11.6–14.5)
GLUCOSE SERPL-MCNC: 77 MG/DL (ref 74–99)
GLUCOSE SERPL-MCNC: 83 MG/DL (ref 74–99)
HCT VFR BLD AUTO: 40.2 % (ref 35–45)
HGB BLD-MCNC: 13.1 G/DL (ref 12–16)
IMM GRANULOCYTES # BLD AUTO: 0 K/UL (ref 0–0.04)
IMM GRANULOCYTES NFR BLD AUTO: 0 % (ref 0–0.5)
LYMPHOCYTES # BLD: 0.8 K/UL (ref 0.9–3.6)
LYMPHOCYTES NFR BLD: 23 % (ref 21–52)
MCH RBC QN AUTO: 31.1 PG (ref 24–34)
MCHC RBC AUTO-ENTMCNC: 32.6 G/DL (ref 31–37)
MCV RBC AUTO: 95.5 FL (ref 78–100)
MONOCYTES # BLD: 0.5 K/UL (ref 0.05–1.2)
MONOCYTES NFR BLD: 14 % (ref 3–10)
NEUTS SEG # BLD: 2.1 K/UL (ref 1.8–8)
NEUTS SEG NFR BLD: 61 % (ref 40–73)
NRBC # BLD: 0 K/UL (ref 0–0.01)
NRBC BLD-RTO: 0 PER 100 WBC
PLATELET # BLD AUTO: 128 K/UL (ref 135–420)
PMV BLD AUTO: 10.5 FL (ref 9.2–11.8)
POTASSIUM SERPL-SCNC: 3.4 MMOL/L (ref 3.5–5.5)
POTASSIUM SERPL-SCNC: 4.2 MMOL/L (ref 3.5–5.5)
RBC # BLD AUTO: 4.21 M/UL (ref 4.2–5.3)
SERVICE CMNT-IMP: ABNORMAL
SODIUM SERPL-SCNC: 137 MMOL/L (ref 136–145)
SODIUM SERPL-SCNC: 138 MMOL/L (ref 136–145)
WBC # BLD AUTO: 3.4 K/UL (ref 4.6–13.2)

## 2022-01-29 PROCEDURE — 74011250636 HC RX REV CODE- 250/636: Performed by: STUDENT IN AN ORGANIZED HEALTH CARE EDUCATION/TRAINING PROGRAM

## 2022-01-29 PROCEDURE — 80048 BASIC METABOLIC PNL TOTAL CA: CPT

## 2022-01-29 PROCEDURE — 65270000029 HC RM PRIVATE

## 2022-01-29 PROCEDURE — 74011250637 HC RX REV CODE- 250/637: Performed by: STUDENT IN AN ORGANIZED HEALTH CARE EDUCATION/TRAINING PROGRAM

## 2022-01-29 PROCEDURE — 74011000258 HC RX REV CODE- 258: Performed by: STUDENT IN AN ORGANIZED HEALTH CARE EDUCATION/TRAINING PROGRAM

## 2022-01-29 PROCEDURE — 85025 COMPLETE CBC W/AUTO DIFF WBC: CPT

## 2022-01-29 PROCEDURE — 36415 COLL VENOUS BLD VENIPUNCTURE: CPT

## 2022-01-29 PROCEDURE — 2709999900 HC NON-CHARGEABLE SUPPLY

## 2022-01-29 PROCEDURE — 74011000250 HC RX REV CODE- 250: Performed by: STUDENT IN AN ORGANIZED HEALTH CARE EDUCATION/TRAINING PROGRAM

## 2022-01-29 RX ORDER — POLYETHYLENE GLYCOL 3350 17 G/17G
17 POWDER, FOR SOLUTION ORAL 2 TIMES DAILY
Qty: 60 PACKET | Refills: 0 | Status: SHIPPED | OUTPATIENT
Start: 2022-01-29

## 2022-01-29 RX ORDER — AMOXICILLIN AND CLAVULANATE POTASSIUM 875; 125 MG/1; MG/1
1 TABLET, FILM COATED ORAL EVERY 8 HOURS
Qty: 15 TABLET | Refills: 0 | Status: SHIPPED | OUTPATIENT
Start: 2022-01-29 | End: 2022-01-30

## 2022-01-29 RX ORDER — DEXTROMETHORPHAN POLISTIREX 30 MG/5 ML
SUSPENSION, EXTENDED RELEASE 12 HR ORAL
Status: COMPLETED | OUTPATIENT
Start: 2022-01-29 | End: 2022-01-29

## 2022-01-29 RX ORDER — POTASSIUM CHLORIDE 20 MEQ/1
20 TABLET, EXTENDED RELEASE ORAL
Status: COMPLETED | OUTPATIENT
Start: 2022-01-29 | End: 2022-01-29

## 2022-01-29 RX ORDER — CARBIDOPA AND LEVODOPA 25; 100 MG/1; MG/1
0.5 TABLET ORAL 3 TIMES DAILY
Qty: 45 TABLET | Refills: 0 | Status: SHIPPED | OUTPATIENT
Start: 2022-01-29 | End: 2022-02-28

## 2022-01-29 RX ADMIN — HEPARIN SODIUM 5000 UNITS: 5000 INJECTION, SOLUTION INTRAVENOUS; SUBCUTANEOUS at 17:40

## 2022-01-29 RX ADMIN — HYDROCODONE BITARTRATE AND ACETAMINOPHEN 1 TABLET: 10; 325 TABLET ORAL at 05:25

## 2022-01-29 RX ADMIN — SENNOSIDES 8.6 MG: 8.6 TABLET, COATED ORAL at 10:55

## 2022-01-29 RX ADMIN — SODIUM CHLORIDE, PRESERVATIVE FREE 10 ML: 5 INJECTION INTRAVENOUS at 05:30

## 2022-01-29 RX ADMIN — POTASSIUM CHLORIDE 20 MEQ: 1500 TABLET, EXTENDED RELEASE ORAL at 09:00

## 2022-01-29 RX ADMIN — CARBIDOPA AND LEVODOPA 0.5 TABLET: 25; 100 TABLET ORAL at 17:40

## 2022-01-29 RX ADMIN — CARBIDOPA AND LEVODOPA 0.5 TABLET: 25; 100 TABLET ORAL at 21:09

## 2022-01-29 RX ADMIN — ATORVASTATIN CALCIUM 10 MG: 10 TABLET, FILM COATED ORAL at 10:55

## 2022-01-29 RX ADMIN — ENEMA 1 ENEMA: 19; 7 ENEMA RECTAL at 12:00

## 2022-01-29 RX ADMIN — HYDROCODONE BITARTRATE AND ACETAMINOPHEN 1 TABLET: 10; 325 TABLET ORAL at 11:25

## 2022-01-29 RX ADMIN — POTASSIUM CHLORIDE 20 MEQ: 1500 TABLET, EXTENDED RELEASE ORAL at 11:13

## 2022-01-29 RX ADMIN — Medication 133 ML: at 18:45

## 2022-01-29 RX ADMIN — PIPERACILLIN AND TAZOBACTAM 3.38 G: 3; .375 INJECTION, POWDER, LYOPHILIZED, FOR SOLUTION INTRAVENOUS at 21:09

## 2022-01-29 RX ADMIN — PIPERACILLIN AND TAZOBACTAM 3.38 G: 3; .375 INJECTION, POWDER, LYOPHILIZED, FOR SOLUTION INTRAVENOUS at 05:27

## 2022-01-29 RX ADMIN — SODIUM CHLORIDE, PRESERVATIVE FREE 10 ML: 5 INJECTION INTRAVENOUS at 21:14

## 2022-01-29 RX ADMIN — POLYETHYLENE GLYCOL 3350 17 G: 17 POWDER, FOR SOLUTION ORAL at 10:55

## 2022-01-29 RX ADMIN — HEPARIN SODIUM 5000 UNITS: 5000 INJECTION, SOLUTION INTRAVENOUS; SUBCUTANEOUS at 10:55

## 2022-01-29 RX ADMIN — TRAZODONE HYDROCHLORIDE 100 MG: 50 TABLET ORAL at 21:09

## 2022-01-29 RX ADMIN — POTASSIUM CHLORIDE 20 MEQ: 1500 TABLET, EXTENDED RELEASE ORAL at 10:55

## 2022-01-29 RX ADMIN — PIPERACILLIN AND TAZOBACTAM 3.38 G: 3; .375 INJECTION, POWDER, LYOPHILIZED, FOR SOLUTION INTRAVENOUS at 14:40

## 2022-01-29 RX ADMIN — CARBIDOPA AND LEVODOPA 0.5 TABLET: 25; 100 TABLET ORAL at 10:55

## 2022-01-29 RX ADMIN — BUPROPION HYDROCHLORIDE 300 MG: 300 TABLET, FILM COATED, EXTENDED RELEASE ORAL at 07:18

## 2022-01-29 RX ADMIN — SODIUM CHLORIDE, PRESERVATIVE FREE 10 ML: 5 INJECTION INTRAVENOUS at 14:40

## 2022-01-29 RX ADMIN — HYDROCODONE BITARTRATE AND ACETAMINOPHEN 1 TABLET: 10; 325 TABLET ORAL at 21:10

## 2022-01-29 RX ADMIN — HEPARIN SODIUM 5000 UNITS: 5000 INJECTION, SOLUTION INTRAVENOUS; SUBCUTANEOUS at 23:48

## 2022-01-29 RX ADMIN — Medication 2000 UNITS: at 10:55

## 2022-01-29 NOTE — ROUTINE PROCESS
Bedside and Verbal shift change report given to JACQUIE Reina (oncoming nurse) by Patti Poole RN (offgoing nurse). Report included the following information SBAR, Kardex, Procedure Summary, Intake/Output and Recent Results.

## 2022-01-29 NOTE — PROGRESS NOTES
Intern Progress Note  [address] [facility], [place]       Patient: Jordon Summers MRN: [id number]  CSN: [id number]    YOB: 1940  Age: 80 y.o. Sex: female    DOA: 1/26/2022 LOS:  LOS: 3 days                    Subjective:      Acute events: K repleted. Patient resting in bed this AM. Her only concern today is for constipation; she states she has not had a bowel movement for the past 6-7 days. Scheduled senna has not helped. Aly is collecting urine. Denies burning with urine collection or other urinary sxs. Tolerating diet well. Would like to get out of bed and move around more but states PT has not come to work with her for the past few days. ROS: Endorse abd pain, constipation. Denies lightheadedness, SOB, muscle pain/weakness. Objective:     Patient Vitals for the past 24 hrs:   Temp Pulse Resp BP SpO2   01/29/22 0435 98.1 °F (36.7 °C) 75 18 107/70 95 %   01/28/22 2100 98.4 °F (36.9 °C) 90 18 (!) 141/80 95 %   01/28/22 1455 98.2 °F (36.8 °C) 91 18 128/71 93 %   01/28/22 1151 98.4 °F (36.9 °C) 76 18 119/73 93 %         Intake/Output Summary (Last 24 hours) at 1/29/2022 0903  Last data filed at 1/29/2022 0449  Gross per 24 hour   Intake 365 ml   Output 1350 ml   Net -985 ml       Physical Exam:   General:  No acute distress, visibly uncomfortable holding abdomen. CV:  RRR, no M/G/R appreciated. No visible pulsations or thrills. RESP:  Unlabored breathing. Lungs CTAB. Equal expansion bilaterally. ABD:  Soft, nondistended, general diffuse tenderness to palpation. BS (+). No guarding. No suprapubic tenderness. Neuro:  5/5 strength in bilateral upper and lower extremities. Ext:  No edema. 2+ radial pulses bilaterally.   Skin:  No rashes, lesions, or ulcers appreciated  Psych: Normal mood and affect.     Lab/Data Reviewed:  Recent Results (from the past 24 hour(s))   CBC WITH AUTOMATED DIFF    Collection Time: 01/28/22  9:24 AM   Result Value Ref Range    WBC 3.5 (L) 4.6 - 13.2 K/uL    RBC 4.30 4.20 - 5.30 M/uL    HGB 13.8 12.0 - 16.0 g/dL    HCT 41.0 35.0 - 45.0 %    MCV 95.3 78.0 - 100.0 FL    MCH 32.1 24.0 - 34.0 PG    MCHC 33.7 31.0 - 37.0 g/dL    RDW 13.3 11.6 - 14.5 %    PLATELET 003 885 - 861 K/uL    MPV 10.6 9.2 - 11.8 FL    NRBC 0.0 0  WBC    ABSOLUTE NRBC 0.00 0.00 - 0.01 K/uL    NEUTROPHILS 60 40 - 73 %    LYMPHOCYTES 23 21 - 52 %    MONOCYTES 15 (H) 3 - 10 %    EOSINOPHILS 1 0 - 5 %    BASOPHILS 1 0 - 2 %    IMMATURE GRANULOCYTES 0 0.0 - 0.5 %    ABS. NEUTROPHILS 2.1 1.8 - 8.0 K/UL    ABS. LYMPHOCYTES 0.8 (L) 0.9 - 3.6 K/UL    ABS. MONOCYTES 0.5 0.05 - 1.2 K/UL    ABS. EOSINOPHILS 0.0 0.0 - 0.4 K/UL    ABS. BASOPHILS 0.0 0.0 - 0.1 K/UL    ABS. IMM. GRANS. 0.0 0.00 - 0.04 K/UL    DF AUTOMATED     METABOLIC PANEL, BASIC    Collection Time: 01/29/22  1:29 AM   Result Value Ref Range    Sodium 138 136 - 145 mmol/L    Potassium 3.4 (L) 3.5 - 5.5 mmol/L    Chloride 104 100 - 111 mmol/L    CO2 26 21 - 32 mmol/L    Anion gap 8 3.0 - 18 mmol/L    Glucose 77 74 - 99 mg/dL    BUN 16 7.0 - 18 MG/DL    Creatinine 0.93 0.6 - 1.3 MG/DL    BUN/Creatinine ratio 17 12 - 20      GFR est AA >60 >60 ml/min/1.73m2    GFR est non-AA 58 (L) >60 ml/min/1.73m2    Calcium 8.3 (L) 8.5 - 10.1 MG/DL   CBC WITH AUTOMATED DIFF    Collection Time: 01/29/22  1:29 AM   Result Value Ref Range    WBC 3.4 (L) 4.6 - 13.2 K/uL    RBC 4.21 4.20 - 5.30 M/uL    HGB 13.1 12.0 - 16.0 g/dL    HCT 40.2 35.0 - 45.0 %    MCV 95.5 78.0 - 100.0 FL    MCH 31.1 24.0 - 34.0 PG    MCHC 32.6 31.0 - 37.0 g/dL    RDW 13.2 11.6 - 14.5 %    PLATELET 493 (L) 702 - 420 K/uL    MPV 10.5 9.2 - 11.8 FL    NRBC 0.0 0  WBC    ABSOLUTE NRBC 0.00 0.00 - 0.01 K/uL    NEUTROPHILS 61 40 - 73 %    LYMPHOCYTES 23 21 - 52 %    MONOCYTES 14 (H) 3 - 10 %    EOSINOPHILS 2 0 - 5 %    BASOPHILS 0 0 - 2 %    IMMATURE GRANULOCYTES 0 0.0 - 0.5 %    ABS. NEUTROPHILS 2.1 1.8 - 8.0 K/UL    ABS. LYMPHOCYTES 0.8 (L) 0.9 - 3.6 K/UL    ABS. MONOCYTES 0.5 0.05 - 1.2 K/UL    ABS. EOSINOPHILS 0.1 0.0 - 0.4 K/UL    ABS. BASOPHILS 0.0 0.0 - 0.1 K/UL    ABS. IMM. GRANS. 0.0 0.00 - 0.04 K/UL    DF AUTOMATED         Assessment & Plan:     80 y.o. female with PMH depression, anxiety, diverticulitis, osteoarthritis, HLD, HTN, GERD, neurofibromatosis, chronic pain, now admitted for UTI and lower extremity weakness.     Urinary Retention, LE weakness   - Patient presented with urinary retention in the setting of worsening bilateral hip/lower back pain, LE weakness and worsening mobility. Patient's  reported to ED that patient complained of urinary retention morning of 1/26. Bladder scan in ED revealed ~500ml urine retained and a desai was subsequently placed. Patient's  reported to ED that when patient tried to stand from chair on 1/26, she slowly slid to floor. Patient denied urinary or fecal incontinence or saddle anesthesia. On exam, no point tenderness to lower back, strength 4/5 bilateral LE with R stronger than L.  ED reported patient unable to bear weight initially. - Patient has history of neurofibromatosis. Last seen by neurosurgery 10/21 noted stable cervical spine MRI with known nerve sheath tumors around cervical cord with no active compression or cord signal change. 10/21 visit also noted that patient reported gait imbalance and using a wheelchair. Patient currently reporting using a walker to ambulate at home. - Feverish (100.6F) patient with no lower back point tenderness and no known history of IV drug use, low suspicion for spinal abscess. - MRI 1/26: Cervical spine: Motion artifact significantly limits evaluation. Suboccipital through C4 posterior hardware fixation as above. Associated artifact further limits evaluation. Mild to moderate spinal canal and foraminal stenoses at C4-C7 levels. Multilevel mild disc herniations. Left greater than right notable facet arthropathy at C4-C7 levels.  Thoracic spine: No critical spinal canal stenosis at any level. Mild to moderate foraminal stenoses at T5-T6 and T6-T7. Anterior T10 body ovoid enhancing focus, indeterminate, possibly atypical hemangioma, without definite correlate on CT. Consider follow-up thoracic spine MRI in 3-6 months to reassess, if warranted. Lumbar spine: No critical spinal canal stenosis at any level. Multifocal mild to moderate foraminal stenoses, most notable at L1-L2 and L5-S1. Dextrocurvature apex L3. Multilevel mild disc herniations and mild to moderate facet arthropathy. - Evaluated by Neurology yesterday, thought to have symptoms of Parkinson's (patient noted being told this in the past); started on Sinemet  PLAN:  - PT/OT for strengthening exercises, HH for discharge  - Neurology following, appreciate assistance  - Strength does appear to have improve, patient states at her baseline  - Remove Aly, voiding trial today in preparation for discharge  - Daily CBC, CMP, Mg, Phos     UTI w/ urinary retention   - Patient febrile to 100.6 on arrival.  WBC wnl. Did not meet sepsis criteria. UA positive for small blood, nitrites, and 2+ bacteria. Urine culture sent. Zosyn started. General tenderness to abdominal palpation on exam.   - Urine culture showing gram negative rods  PLAN:  - Continue zosyn   - FU urine culture speciation  - FU blood cultures x 2, NGTD  - Low PO intake due to sore throat, given a bolus of LR     Diverticulitis   - Patient has known history of diverticulitis, CT abdomen/pelvis 1/25 revealed scattered diverticula at colon consistent with mild diverticulitis. Patient febrile to 100.6 with general tenderness to abdominal palpation. Patient unsure of date of last BM. Previously started on Cipro and Flagyl in ED (however, Flagyl is currently on national back order per pharm).    - Suspect abdominal discomfort with cough is related to constipation, having not had a bowel movement in a week  PLAN:  - Scheduled Miralax and Senna  - Continue zosyn      COVID   - Elderly patient presenting with isolated fever. Cause for fever multifactorial including ?urosepsis from underlying UTI vs diverticulitis, vs COVID. Patient COVID vaccinated x2 last dose 3/10, due for booster dose. - Positive test 1/27; patient endorsing cough and sore throat   - Patient is not requiring supplemental oxygen at this time  PLAN:  - Continue to trend COVID inflammatory markers as needed  - Chloraseptic for sore throat  - D-dimer elevated to 1.02 though Wells score of zero, low risk for PE and DVT     Chronic Pain, DJD, multiple joints   - Patient initially complaining of increased lower back pain. On exam no point tenderness of lumbar spine, but signifcant tenderness to general palpation of bilateral hips and L foot with no significant MSK abnormalities on exam.  CT abdomen/pelvis did not note any acute abnormalities at skeleton. Medications per Dr Boston Ill note 11/2021 report norco and meloxicam.  Patient reported taking norco for pain.   PLAN:  - continue norco  q6h PRN  - lidocaine patches PRN     Parkinson's  - Known history, right UE low-frequency tremor  - Possibly contributing to presentation with LE weakness  PLAN:  - Continue Sinemet  - Neurology following, appreciate assistance    Depression/Anxiety  - home bupropion XL 300mg daily  - home trazodone 100mg daily at bedtime  - home xanax 0.25mg BID PRN     HTN   - BP elevated to 150/77 on arrival, currently 154/80   - Home amlodipine 5mg daily  PLAN:  - Monitor BP per unit protocol, can re-add home medication though some Bps have been soft     Hyperlipidemia  - Continue home atorvastatin 10mg     Global Care:  - VS per unit routine  - supplemental O2 for sats < 92%  - incentive spirometer  - PT/OT/CM     Diet  Adult regular   DVT Prophylaxis  SQH   GI Prophylaxis   -    Code status  DNR   Disposition  TBD      Point of Contact Zain Rosen  Relationship:   [phone]           Ayden Knight MD , PGY-1 [facility]   January 29, 2022, 8:32 AM

## 2022-01-29 NOTE — PROGRESS NOTES
Discharge/Transition Planning    [facility] referral system virtual printer not working still.  Faxed home health orders manually to [facility]    Sending bed side commode order to [employer]

## 2022-01-30 VITALS
RESPIRATION RATE: 18 BRPM | BODY MASS INDEX: 29.84 KG/M2 | OXYGEN SATURATION: 91 % | WEIGHT: 168.43 LBS | HEIGHT: 63 IN | TEMPERATURE: 98.3 F | SYSTOLIC BLOOD PRESSURE: 142 MMHG | DIASTOLIC BLOOD PRESSURE: 76 MMHG | HEART RATE: 97 BPM

## 2022-01-30 LAB
ANION GAP SERPL CALC-SCNC: 10 MMOL/L (ref 3–18)
BASOPHILS # BLD: 0 K/UL (ref 0–0.1)
BASOPHILS NFR BLD: 0 % (ref 0–2)
BUN SERPL-MCNC: 12 MG/DL (ref 7–18)
BUN/CREAT SERPL: 15 (ref 12–20)
CALCIUM SERPL-MCNC: 9.3 MG/DL (ref 8.5–10.1)
CHLORIDE SERPL-SCNC: 104 MMOL/L (ref 100–111)
CO2 SERPL-SCNC: 22 MMOL/L (ref 21–32)
CREAT SERPL-MCNC: 0.78 MG/DL (ref 0.6–1.3)
DIFFERENTIAL METHOD BLD: ABNORMAL
EOSINOPHIL # BLD: 0 K/UL (ref 0–0.4)
EOSINOPHIL NFR BLD: 0 % (ref 0–5)
ERYTHROCYTE [DISTWIDTH] IN BLOOD BY AUTOMATED COUNT: 12.8 % (ref 11.6–14.5)
GLUCOSE SERPL-MCNC: 85 MG/DL (ref 74–99)
HCT VFR BLD AUTO: 42.7 % (ref 35–45)
HGB BLD-MCNC: 14.6 G/DL (ref 12–16)
IMM GRANULOCYTES # BLD AUTO: 0 K/UL (ref 0–0.04)
IMM GRANULOCYTES NFR BLD AUTO: 0 % (ref 0–0.5)
LYMPHOCYTES # BLD: 0.6 K/UL (ref 0.9–3.6)
LYMPHOCYTES NFR BLD: 19 % (ref 21–52)
MCH RBC QN AUTO: 31.9 PG (ref 24–34)
MCHC RBC AUTO-ENTMCNC: 34.2 G/DL (ref 31–37)
MCV RBC AUTO: 93.2 FL (ref 78–100)
MONOCYTES # BLD: 0.5 K/UL (ref 0.05–1.2)
MONOCYTES NFR BLD: 13 % (ref 3–10)
NEUTS SEG # BLD: 2.3 K/UL (ref 1.8–8)
NEUTS SEG NFR BLD: 67 % (ref 40–73)
NRBC # BLD: 0 K/UL (ref 0–0.01)
NRBC BLD-RTO: 0 PER 100 WBC
PLATELET # BLD AUTO: 143 K/UL (ref 135–420)
PMV BLD AUTO: 10.8 FL (ref 9.2–11.8)
POTASSIUM SERPL-SCNC: 3.7 MMOL/L (ref 3.5–5.5)
RBC # BLD AUTO: 4.58 M/UL (ref 4.2–5.3)
SODIUM SERPL-SCNC: 136 MMOL/L (ref 136–145)
WBC # BLD AUTO: 3.4 K/UL (ref 4.6–13.2)

## 2022-01-30 PROCEDURE — 74011250637 HC RX REV CODE- 250/637: Performed by: STUDENT IN AN ORGANIZED HEALTH CARE EDUCATION/TRAINING PROGRAM

## 2022-01-30 PROCEDURE — 74011000258 HC RX REV CODE- 258: Performed by: STUDENT IN AN ORGANIZED HEALTH CARE EDUCATION/TRAINING PROGRAM

## 2022-01-30 PROCEDURE — 80048 BASIC METABOLIC PNL TOTAL CA: CPT

## 2022-01-30 PROCEDURE — 85025 COMPLETE CBC W/AUTO DIFF WBC: CPT

## 2022-01-30 PROCEDURE — 2709999900 HC NON-CHARGEABLE SUPPLY

## 2022-01-30 PROCEDURE — 36415 COLL VENOUS BLD VENIPUNCTURE: CPT

## 2022-01-30 PROCEDURE — 74011000250 HC RX REV CODE- 250: Performed by: STUDENT IN AN ORGANIZED HEALTH CARE EDUCATION/TRAINING PROGRAM

## 2022-01-30 PROCEDURE — 74011250636 HC RX REV CODE- 250/636: Performed by: STUDENT IN AN ORGANIZED HEALTH CARE EDUCATION/TRAINING PROGRAM

## 2022-01-30 RX ORDER — HYDROCORTISONE 25 MG/G
CREAM TOPICAL
Status: DISCONTINUED | OUTPATIENT
Start: 2022-01-30 | End: 2022-01-30 | Stop reason: HOSPADM

## 2022-01-30 RX ORDER — LOPERAMIDE HYDROCHLORIDE 2 MG/1
4 CAPSULE ORAL ONCE
Status: DISCONTINUED | OUTPATIENT
Start: 2022-01-30 | End: 2022-01-30 | Stop reason: HOSPADM

## 2022-01-30 RX ADMIN — HEPARIN SODIUM 5000 UNITS: 5000 INJECTION, SOLUTION INTRAVENOUS; SUBCUTANEOUS at 15:01

## 2022-01-30 RX ADMIN — Medication 2000 UNITS: at 09:25

## 2022-01-30 RX ADMIN — PIPERACILLIN AND TAZOBACTAM 3.38 G: 3; .375 INJECTION, POWDER, LYOPHILIZED, FOR SOLUTION INTRAVENOUS at 04:20

## 2022-01-30 RX ADMIN — POLYETHYLENE GLYCOL 3350 17 G: 17 POWDER, FOR SOLUTION ORAL at 09:24

## 2022-01-30 RX ADMIN — BUPROPION HYDROCHLORIDE 300 MG: 300 TABLET, FILM COATED, EXTENDED RELEASE ORAL at 06:02

## 2022-01-30 RX ADMIN — SENNOSIDES 8.6 MG: 8.6 TABLET, COATED ORAL at 09:25

## 2022-01-30 RX ADMIN — MINERAL OIL, PETROLATUM, PHENYLEPHRINE HCL: 14; 74.9; .25 OINTMENT RECTAL at 14:50

## 2022-01-30 RX ADMIN — PIPERACILLIN AND TAZOBACTAM 3.38 G: 3; .375 INJECTION, POWDER, LYOPHILIZED, FOR SOLUTION INTRAVENOUS at 13:42

## 2022-01-30 RX ADMIN — HEPARIN SODIUM 5000 UNITS: 5000 INJECTION, SOLUTION INTRAVENOUS; SUBCUTANEOUS at 09:25

## 2022-01-30 RX ADMIN — CARBIDOPA AND LEVODOPA 0.5 TABLET: 25; 100 TABLET ORAL at 09:25

## 2022-01-30 RX ADMIN — SODIUM CHLORIDE, PRESERVATIVE FREE 10 ML: 5 INJECTION INTRAVENOUS at 13:53

## 2022-01-30 RX ADMIN — CARBIDOPA AND LEVODOPA 0.5 TABLET: 25; 100 TABLET ORAL at 15:01

## 2022-01-30 RX ADMIN — HYDROCORTISONE: 25 CREAM TOPICAL at 14:50

## 2022-01-30 RX ADMIN — SODIUM CHLORIDE, PRESERVATIVE FREE 10 ML: 5 INJECTION INTRAVENOUS at 05:21

## 2022-01-30 RX ADMIN — ATORVASTATIN CALCIUM 10 MG: 10 TABLET, FILM COATED ORAL at 09:25

## 2022-01-30 RX ADMIN — ACETAMINOPHEN 650 MG: 325 TABLET ORAL at 05:18

## 2022-01-30 NOTE — PROGRESS NOTES
1950: Bedside shift change report given to [place], RN (oncoming nurse) by Jonathan Brown RN (offgoing nurse). Report included the following information SBAR, Kardex, Intake/Output and MAR.     2210: Dr. Bennett Mirza ordered digital disimpaction,  PT's stool was impacted, and large stool was removed till pt could not tolerate impaction. During the disimpaction hemorrhoid was noted, and MD was paged and he ordered phenyleph prep prn. Will continues to monitor pt    2245: pt was offered a bedpan and pt had large bowel movement. After the disimpaction pt have had couple of large bowel movement.

## 2022-01-30 NOTE — PROGRESS NOTES
Intern Progress Note  [address] [facility]       Patient: Julia Urban MRN: [id number]  CSN: [id number]    YOB: 1940  Age: 80 y.o. Sex: female    DOA: 1/26/2022 LOS:  LOS: 4 days                    Subjective:      Patient resting in bed this AM. Patient's Aly was removed yesterday, able to void after removal. She also had a manual disimpaction after failed enemas. Patient did have a large stool burden and feels better this morning. Currently without abdominal discomfort. She does continue to endorse cough, denies shortness of breath. Remains on room air. She is hopeful to return home today; states that her  was recently found to have Silverio as well. ROS:  Denies chest pain, lightheadedness, SOB, abdominal pain, muscle pain/weakness. Objective:     Patient Vitals for the past 24 hrs:   Temp Pulse Resp BP SpO2   01/30/22 0433 98.5 °F (36.9 °C) 94 18 132/77 95 %   01/30/22 0123 98.9 °F (37.2 °C) 98 19 122/82 95 %   01/29/22 2131 98.3 °F (36.8 °C) 92 18 129/72 94 %   01/29/22 1700 98.1 °F (36.7 °C) 81 20 (!) 150/84 93 %   01/29/22 1300 97.8 °F (36.6 °C) 88 18 126/81 93 %   01/29/22 0900 98.1 °F (36.7 °C) 86 18 125/61 92 %         Intake/Output Summary (Last 24 hours) at 1/30/2022 0746  Last data filed at 1/29/2022 2131  Gross per 24 hour   Intake 125 ml   Output 450 ml   Net -325 ml       Physical Exam:   General:  No acute distress, appears comfortable lying in bed. CV:  RRR, no M/G/R appreciated. No visible pulsations or thrills. RESP:  Unlabored breathing. Lungs CTAB. Equal expansion bilaterally. ABD:  Soft, nondistended, no ttp. BS (+). No guarding. No suprapubic tenderness. Neuro:  5/5 strength in bilateral upper and lower extremities. Ext:  No edema. 2+ radial pulses bilaterally.   Skin:  No rashes, lesions, or ulcers appreciated  Psych: Normal mood and affect.     Lab/Data Reviewed:  Recent Results (from the past 24 hour(s))   METABOLIC PANEL, BASIC Collection Time: 01/29/22  4:07 PM   Result Value Ref Range    Sodium 137 136 - 145 mmol/L    Potassium 4.2 3.5 - 5.5 mmol/L    Chloride 104 100 - 111 mmol/L    CO2 28 21 - 32 mmol/L    Anion gap 5 3.0 - 18 mmol/L    Glucose 83 74 - 99 mg/dL    BUN 15 7.0 - 18 MG/DL    Creatinine 0.85 0.6 - 1.3 MG/DL    BUN/Creatinine ratio 18 12 - 20      GFR est AA >60 >60 ml/min/1.73m2    GFR est non-AA >60 >60 ml/min/1.73m2    Calcium 9.2 8.5 - 62.8 MG/DL   METABOLIC PANEL, BASIC    Collection Time: 01/30/22  4:53 AM   Result Value Ref Range    Sodium 136 136 - 145 mmol/L    Potassium 3.7 3.5 - 5.5 mmol/L    Chloride 104 100 - 111 mmol/L    CO2 22 21 - 32 mmol/L    Anion gap 10 3.0 - 18 mmol/L    Glucose 85 74 - 99 mg/dL    BUN 12 7.0 - 18 MG/DL    Creatinine 0.78 0.6 - 1.3 MG/DL    BUN/Creatinine ratio 15 12 - 20      GFR est AA >60 >60 ml/min/1.73m2    GFR est non-AA >60 >60 ml/min/1.73m2    Calcium 9.3 8.5 - 10.1 MG/DL   CBC WITH AUTOMATED DIFF    Collection Time: 01/30/22  4:53 AM   Result Value Ref Range    WBC 3.4 (L) 4.6 - 13.2 K/uL    RBC 4.58 4.20 - 5.30 M/uL    HGB 14.6 12.0 - 16.0 g/dL    HCT 42.7 35.0 - 45.0 %    MCV 93.2 78.0 - 100.0 FL    MCH 31.9 24.0 - 34.0 PG    MCHC 34.2 31.0 - 37.0 g/dL    RDW 12.8 11.6 - 14.5 %    PLATELET 290 075 - 429 K/uL    MPV 10.8 9.2 - 11.8 FL    NRBC 0.0 0  WBC    ABSOLUTE NRBC 0.00 0.00 - 0.01 K/uL    NEUTROPHILS 67 40 - 73 %    LYMPHOCYTES 19 (L) 21 - 52 %    MONOCYTES 13 (H) 3 - 10 %    EOSINOPHILS 0 0 - 5 %    BASOPHILS 0 0 - 2 %    IMMATURE GRANULOCYTES 0 0.0 - 0.5 %    ABS. NEUTROPHILS 2.3 1.8 - 8.0 K/UL    ABS. LYMPHOCYTES 0.6 (L) 0.9 - 3.6 K/UL    ABS. MONOCYTES 0.5 0.05 - 1.2 K/UL    ABS. EOSINOPHILS 0.0 0.0 - 0.4 K/UL    ABS. BASOPHILS 0.0 0.0 - 0.1 K/UL    ABS. IMM.  GRANS. 0.0 0.00 - 0.04 K/UL    DF AUTOMATED         Assessment & Plan:     80 y.o. female with PMH depression, anxiety, diverticulitis, osteoarthritis, HLD, HTN, GERD, neurofibromatosis, chronic pain, now admitted for UTI and lower extremity weakness.     Urinary Retention, LE weakness   - Patient presented with urinary retention in the setting of worsening bilateral hip/lower back pain, LE weakness and worsening mobility. Patient's  reported to ED that patient complained of urinary retention morning of 1/26. Bladder scan in ED revealed ~500ml urine retained and a desai was subsequently placed. Patient's  reported to ED that when patient tried to stand from chair on 1/26, she slowly slid to floor. Patient denied urinary or fecal incontinence or saddle anesthesia. On exam, no point tenderness to lower back, strength 4/5 bilateral LE with R stronger than L.  ED reported patient unable to bear weight initially. - Patient has history of neurofibromatosis. Last seen by neurosurgery 10/21 noted stable cervical spine MRI with known nerve sheath tumors around cervical cord with no active compression or cord signal change. 10/21 visit also noted that patient reported gait imbalance and using a wheelchair. Patient currently reporting using a walker to ambulate at home. - Feverish (100.6F) patient with no lower back point tenderness and no known history of IV drug use, low suspicion for spinal abscess. - MRI 1/26: Cervical spine: Motion artifact significantly limits evaluation. Suboccipital through C4 posterior hardware fixation as above. Associated artifact further limits evaluation. Mild to moderate spinal canal and foraminal stenoses at C4-C7 levels. Multilevel mild disc herniations. Left greater than right notable facet arthropathy at C4-C7 levels. Thoracic spine: No critical spinal canal stenosis at any level. Mild to moderate foraminal stenoses at T5-T6 and T6-T7. Anterior T10 body ovoid enhancing focus, indeterminate, possibly atypical hemangioma, without definite correlate on CT. Consider follow-up thoracic spine MRI in 3-6 months to reassess, if warranted.  Lumbar spine: No critical spinal canal stenosis at any level. Multifocal mild to moderate foraminal stenoses, most notable at L1-L2 and L5-S1. Dextrocurvature apex L3. Multilevel mild disc herniations and mild to moderate facet arthropathy. - Evaluated by Neurology yesterday, thought to have symptoms of Parkinson's (patient noted being told this in the past); started on Sineme  PLAN:  - PT/OT for strengthening exercises, HH for discharge  - Neurology following, appreciate assistance  - Strength appears to have improved, patient states at her baseline  - Aly discontinued, passed void trial  - Daily CBC, CMP, Mg, Phos     UTI w/ urinary retention   - Patient febrile to 100.6 on arrival.  WBC wnl. Did not meet sepsis criteria. UA positive for small blood, nitrites, and 2+ bacteria. Urine culture sent. Zosyn started. General tenderness to abdominal palpation on exam.   - Urine culture showing E coli, pan-sensitive  PLAN:  - Continue zosyn, plan for Augmentin as an outpatient  - FU urine culture speciation  - FU blood cultures x 2, NGTD  - Low PO intake due to sore throat, given a bolus of LR 1/28     Diverticulitis   - Patient has known history of diverticulitis, CT abdomen/pelvis 1/25 revealed scattered diverticula at colon consistent with mild diverticulitis. Patient febrile to 100.6 with general tenderness to abdominal palpation. Patient unsure of date of last BM. Previously started on Cipro and Flagyl in ED (however, Flagyl is currently on national back order per pharm). - Suspect abdominal discomfort with cough is related to constipation, having not had a bowel movement in a week  PLAN:  - Scheduled Miralax and Senna  - Continue zosyn, Augmentin outpatient     COVID   - Elderly patient presenting with isolated fever. Cause for fever multifactorial including ?urosepsis from underlying UTI vs diverticulitis, vs COVID. Patient COVID vaccinated x2 last dose 3/10, due for booster dose.    - Positive test 1/27; patient endorsing cough and sore throat   - Patient is not requiring supplemental oxygen at this time  PLAN:  - Continue to trend COVID inflammatory markers as needed  - Chloraseptic for sore throat  - D-dimer elevated to 1.02, 1/28, though Wells score of zero, low risk for PE and DVT     Chronic Pain, DJD, multiple joints   - Patient initially complaining of increased lower back pain. On exam no point tenderness of lumbar spine, but signifcant tenderness to general palpation of bilateral hips and L foot with no significant MSK abnormalities on exam.  CT abdomen/pelvis did not note any acute abnormalities at skeleton. Medications per Dr Nessa Young note 11/2021 report norco and meloxicam.  Patient reported taking norco for pain.   PLAN:  - Continue norco  q6h PRN, discussed decreasing amount or trial of other medications given persistent constipation  - lidocaine patches PRN     Parkinson's  - Known history, right UE low-frequency tremor  - Possibly contributing to presentation with LE weakness  PLAN:  - Continue Sinemet  - Neurology following, appreciate assistance    Depression/Anxiety  - home bupropion XL 300mg daily  - home trazodone 100mg daily at bedtime  - home xanax 0.25mg BID PRN     HTN   - BP elevated to 150/77 on arrival, currently 132/77   - Home amlodipine 5mg daily  PLAN:  - Monitor BP per unit protocol, can re-add home medication though some Bps have been soft     Hyperlipidemia  - Continue home atorvastatin 10mg     Global Care:  - VS per unit routine  - supplemental O2 for sats < 92%  - incentive spirometer  - PT/OT/CM     Diet  Adult regular   DVT Prophylaxis  SQH   GI Prophylaxis   -    Code status  DNR   Disposition  TBD      Point of Contact Sharath fu  Relationship:   [phone]           Mak Nunes DO , PGY-1   [facility]   January 30, 2022, 7:52 AM

## 2022-01-30 NOTE — PROGRESS NOTES
Pt with multiple watery stools, MD made aware, daughter states that she'll be here at 530pm to pick pt up. Will continue to monitor closely.

## 2022-01-30 NOTE — PROGRESS NOTES
BRIEF PROGRESS NOTE    PM check on Ms. Laurita Morley. Pt endorses abd pain 2/2 constipation and spasms. She has had 50 MG of Oxycodone over last two days. However, pt states has been taking opioids daily for pain control for the last decade. On rounds earlier, we ordered a fleet enema for this pt which has been ineffective. I ordered a fleet mineral oil enema. Mineral oil enema has been ineffective also. Pt complaining of persistent abd pain. PE reveals tenderness on R U and L quadrants. Rectal exam revealed stool in vault. Digital disimpaction discussed w/pt to which she agreed. 1:16AM update: disimpaction successful. Spasms resolved. Nursing reports evidence of external hemorrhoids. Prep H ordered PRN.     Roldan Mckinnon   [phone]

## 2022-01-31 ENCOUNTER — PATIENT OUTREACH (OUTPATIENT)
Dept: CASE MANAGEMENT | Age: 82
End: 2022-01-31

## 2022-01-31 NOTE — PROGRESS NOTES
[facility]  Brief Progress Note  SUBJECTIVE  Pt seen at bedside. Pt with multiple watery stools today. Review of medications shows few if any opioids given today, patient on high dose opioids OP. OBJECTIVE  Visit Vitals  BP (!) 142/76 (BP 1 Location: Right upper arm, BP Patient Position: At rest)   Pulse 97   Temp 98.3 °F (36.8 °C)   Resp 18   Ht 5' 3\" (1.6 m)   Wt 76.4 kg (168 lb 6.9 oz)   SpO2 91%   BMI 29.84 kg/m²       No results found for this or any previous visit (from the past 12 hour(s)). NAD, aware and alert, wanting to go home    ASSESSMENT/PLAN  VSS. Patient refuses labs to check electrolyte levels at this time, states she wants to go home. Has capacity to make this decision. Likely feel this is opioid withdrawal as opposed to infectious or other causes. In abundance of caution, will order c.diff panel, however feel this is not likely the cause. Will order 1x imodium to be given, given this will likely help the most. Patient to be [facility] today, will restart OP pain regimen.      Josh Mccoy MD, PGY-2   [facility] Medicine   January 30, 2022, 7:05 PM

## 2022-01-31 NOTE — PROGRESS NOTES
Transitions of Care Call    Call within 2 business days of discharge: Yes     Patient: Clifton Velasquez Patient : 1940 MRN: [id number]    Last Discharge [address]       Complaint Diagnosis Description Type Department Provider    22 Back Pain Fever, unspecified fever cause . .. ED to Hosp-Admission (Discharged) (ADMIT) Verenice Adair MD; [place], [place]. .. Was this an external facility discharge? No Discharge Facility: n/a    Challenges to be reviewed by the provider   Additional needs identified to be addressed with provider no  none           Encounter was not routed to provider for escalation. Method of communication with provider: none. Discussed COVID-19 related testing which was available at this time. Test results were positive. Patient informed of results, if available? Pt. Is aware of her covid test result. Patient reports that she is doing well. No new or worsening of symptoms reported by Pt. At this time. Pt. States that she has good support from her family. Pt. States that she is following  Quarantine. Reminded Pt. to go to the nearest emergency room for chest pain, shortness of breath, returning of symptoms that brought her to the emergency room and/or worsening of symptoms. Pt. Verbalized and repeated back understanding. No questions, concerns and/or needs at this time as per Pt. Current Symptoms:no new symptoms and no worsening symptoms    Reviewed New or Changed Meds: patient declined. Pt. States that her daughter in law handles her medications. Do you have what you need at home?  Durable Medical Equipment ordered at discharge: Bedside Commode- received   Home Health/Outpatient orders at discharge: home health care . Will see Patient 22  Pulse oximeter? No    Patient education provided: Reviewed appropriate site of care based on symptoms and resources available to patient including: PCP and When to call 911.      Follow up appointment scheduled within 7 days of discharge: Pt. Reports that her daughter in law set up her PCP apt this morning. Future Appointments   Date Time Provider Aidan Kimi   2/22/2022  1:40 PM Brian Pena MD [facility]       Interventions: closely follow up with PCP. Take all medications as prescribed. call PCP or CTN office for any questions, concerns and/or needs. CTN reviewed discharge instructions, medical action plan and red flags with patient who verbalized understanding. Provided contact information for future needs. Plan for follow-up call in 7-10 days based on severity of symptoms and risk factors. Plan for next call: f/u PCP apt.  assess for symptoms, f/u home health visit, assess for any needs     Jeromy Martinez RN

## 2022-02-01 LAB
BACTERIA SPEC CULT: NORMAL
BACTERIA SPEC CULT: NORMAL
SERVICE CMNT-IMP: NORMAL
SERVICE CMNT-IMP: NORMAL

## 2022-02-07 ENCOUNTER — PATIENT OUTREACH (OUTPATIENT)
Dept: CASE MANAGEMENT | Age: 82
End: 2022-02-07

## 2022-02-07 NOTE — PROGRESS NOTES
Follow Up Call    Challenges to be reviewed by the provider   Additional needs identified to be addressed with provider: no  none           Encounter was not routed to provider for escalation. Method of communication with provider: none. Contacted the patient by telephone to follow up after hospital visit. Patient reports that she is doing well. Status: improved as per Pt. Interventions to address identified needs: closely  follow up with PCP    [facility] follow up appointment(s): No future appointments. Non-[facility] follow up appointment(s): PCP    Follow up appointment completed? Not yet as per Pt. Provided contact information for future needs. Plan for follow-up call in 10-14 days based on severity of symptoms and risk factors. Plan for next call: assess for any needs.       Jessica Liriano RN

## 2022-02-25 ENCOUNTER — PATIENT OUTREACH (OUTPATIENT)
Dept: CASE MANAGEMENT | Age: 82
End: 2022-02-25

## 2022-02-25 NOTE — PROGRESS NOTES
Care Transitions Follow Up Call    Challenges to be reviewed by the provider   Additional needs identified to be addressed with provider: no  none           Method of communication with provider : none    Care Transition Nurse (CTN) contacted the Pt. and Pt. spouse by telephone to follow up after hospital admission. Verified name and  with patient as identifiers. Patient and Pt. Spouse reported that Patient is doing well. No new or worsening of symptoms reported by Pt. And Pt. Spouse at this time. Pt and Pt. Spouse thanked us for follow up call. No questions, concerns and/or needs at this time as per Pt. And Pt. Spouse.

## 2022-03-03 ENCOUNTER — PATIENT OUTREACH (OUTPATIENT)
Dept: CASE MANAGEMENT | Age: 82
End: 2022-03-03

## 2024-02-10 NOTE — PROGRESS NOTES
Continue home medications.   HISTORY OF PRESENT ILLNESS  Elvis Montiel is a 68 y.o. female. HPI She returns for followup of chronic, severe pain which is widespread and polyarticular related to rheumatoid arthritis. She is s/p left TKA. Prior medications include Oxycontin and Zohydro with no improvement. Fentanyl patch caused rash and nausea. Morphine with no help. She is s/p open reduction and internal fixation by Dr. Gee Prudent on 2/8/2017. Ms. Mely Gonzalez complains of worsening pain since her last visit. During our last visit we started oxymorphone ER 5 mg once nightly at bedtime. She reports that this medication was not working and causing her side effects. She has discontinued the medication  and has been out of the medication for about 1 month. She did not fill her last prescription and has her prescription with her today which will be kept in the office and destroyed. We will go ahead and discontinue oxymorphone. She has tried many other long-acting medications in the past with no improvement and/or side effects. She is not interested in trying any further long-acting medications at this time. She also is very upset because she thought she still had her extra 15 pills to use per month as needed. We adjusted this last visit as we had started on long-acting medication. We had another lengthy conversation today about long-acting versus short-acting medication. I continue to encourage her to consider long-acting medication but have agreed to go back to her normal regimen of 1 tablet up to 4 times daily on an as-needed basis with an occasional extra tablet for worsening days but no more than #135 per month. I will have her follow-up in 3 months or sooner if needed. Medications are helping with pain control and quality of life. Her pain is 3-4/10 with medication and 9/10 without. Pt describes pain as aching. Walking and house chores aggravates her pain.  Her pain is improved with medication and rest. Current treatment is helping to improve general activity, mood, walking, sleep, enjoyment of life    Pt does report constipation but is currently under control with conservative treatment and MiraLAX  She  is otherwise doing well with no other complaints today. She denies any adverse events including nausea, vomiting, dizziness,  hallucinations, or seizures. Because the patient's current regimen places him/her at increased risk for possible overdose, a prescription for naloxone nasal spray is being provided. The patient understands that this medication is only to be used in the setting of a possible overdose and that inadvertent use of this medication could precipitate overt withdrawal.         Allergies   Allergen Reactions    Neurontin [Gabapentin] Other (comments)       Past Surgical History:   Procedure Laterality Date    FOOT/TOES SURGERY PROC UNLISTED      HX BREAST AUGMENTATION  1996    Saline Implants    HX CHOLECYSTECTOMY      HX GI  2012    LOW ANTERIOR RESECTION WITH COLOSTOMY AND LEFT OOPHORECTOMY    HX KNEE ARTHROSCOPY Right     HX MASTECTOMY  1996    bilateral mastectomy    HX ORTHOPAEDIC      RIGHT FOOT AND ANKLE SX    HX ORTHOPAEDIC Left 2017    wrist repair    HX OTHER SURGICAL      multiple sx right leg and foot         Review of Systems   Constitutional: Positive for malaise/fatigue. Negative for chills. HENT: Negative for congestion and sore throat. Eyes: Negative. Negative for blurred vision and double vision. Respiratory: Negative. Negative for cough. Cardiovascular: Negative. Negative for leg swelling. Gastrointestinal: Positive for constipation, heartburn and nausea. Negative for diarrhea. Genitourinary: Negative. Musculoskeletal: Positive for myalgias. Negative for falls. Skin: Negative. Neurological: Negative for dizziness, tremors, sensory change and seizures. Endo/Heme/Allergies: Negative. Negative for environmental allergies. Does not bruise/bleed easily. Psychiatric/Behavioral: Positive for depression. Negative for suicidal ideas. The patient is nervous/anxious and has insomnia. Physical Exam   Constitutional: She is oriented to person, place, and time. She appears well-developed and well-nourished. HENT:   Head: Normocephalic. Eyes: EOM are normal.   Neck: No thyromegaly present. Pulmonary/Chest: Effort normal. No respiratory distress. Musculoskeletal: She exhibits tenderness. Antalgic gait using a walker. Neurological: She is alert and oriented to person, place, and time. She has normal reflexes. Gait abnormal.   Skin: Skin is warm and dry. Psychiatric: She has a normal mood and affect. Her behavior is normal. Judgment and thought content normal.   Nursing note and vitals reviewed. ASSESSMENT:    Encounter Diagnoses   Name Primary?  Rheumatoid arthritis involving multiple sites with positive rheumatoid factor (HCC)     Pain of right upper extremity     Myalgia     Chronic pain syndrome     Pain in joint, multiple sites     Primary localized osteoarthrosis of lower leg, unspecified laterality         [place] Prescription Monitoring Program was reviewed which does not demonstrate aberrancies and/or inconsistencies with regard to the historical prescribing of controlled medications to this patient by other providers since her last visit. NOTE: She did receive oxycodone following a fall and wrist fracture filled on 2/6/2017. PLAN / Pt Instructions:  1. Continue current plan. Stable on current medication without adverse events. 2. Refill and adjust Norco 10/325 mg. Take 1 tablet up to 4 times daily as needed. May take an occasional extra tablet not to exceed 135 per month. 3. Discontinue oxymorphone ER 5 mg. Patient was provided education on proper medication disposal options as indicated by the FDA/[place].  Pt was also advised that failing to properly dispose of medications that are no longer part of his/her regimen would be considered non-compliance with the treatment plan. 4. Unfilled prescription for oxymorphone 5 mg dated to fill January 12 kept in office today and destroyed. 5. Continue with MiraLAX as needed  Naloxone 4 mg nasal spray for opioid induced respiratory depression emergency only. Discussed risks of addiction, dependency, and opioid induced hyperalgesia. Return to clinic in 3 months      Medications Ordered Today   Medications    DISCONTD: HYDROcodone-acetaminophen (NORCO)  mg tablet     Sig: Take 1 Tab by mouth four (4) times daily as needed for Pain for up to 30 days. Max Daily Amount: 4 Tabs. May take an additional tablet on bad days not to exceed #135 per month. Indications: Pain     Dispense:  120 Tab     Refill:  0    HYDROcodone-acetaminophen (NORCO)  mg tablet     Sig: Take 1 Tab by mouth every six (6) hours as needed for Pain for up to 30 days. Max Daily Amount: 4 Tabs. May take an additional tablet on bad days not to exceed #135 per month. Indications: Pain     Dispense:  135 Tab     Refill:  0    HYDROcodone-acetaminophen (NORCO)  mg tablet     Sig: Take 1 Tab by mouth every six (6) hours as needed for Pain for up to 30 days. Max Daily Amount: 4 Tabs. May take an additional tablet on bad days not to exceed #135 per month. Indications: Pain     Dispense:  135 Tab     Refill:  0    HYDROcodone-acetaminophen (NORCO)  mg tablet     Sig: Take 1 Tab by mouth four (4) times daily as needed for Pain for up to 30 days. Max Daily Amount: 4 Tabs. May take an additional tablet on bad days not to exceed #135 per month. Indications: Pain     Dispense:  135 Tab     Refill:  0       Pain medications prescribed with the objective of pain relief and improved physical and psychosocial function in this patient.     Spent 40 minutes with patient today reviewing the treatment plan, goals of treatment plan, and limitations of the treatment plan, to include the potential for side effects from medications and procedures. Manuel Barboza, [address] [place] 2/8/2018     Note: Please excuse any typographical errors. Voice recognition software was used for this note and may cause mistakes.

## 2024-09-21 NOTE — PROGRESS NOTES
Blane Russell  1940     HISTORY OF PRESENT ILLNESS  Blane Russell is a 68 y.o. female who presents today for evaluation s/p ORIF left wrist on 2/8/17. She is doing much better. Does notes some nausea today and blisters in her mouth. She has talked to her PCP about this. States pain is 0/10. Patient denies any fever, chills, chest pain, shortness of breath or calf pain. There are no new illness or injuries to report since last seen in the office. No changes in medications, allergies, social or family history. PHYSICAL EXAM:   Visit Vitals    /76    Pulse 90    Ht 5' 3\" (1.6 m)    Wt 185 lb (83.9 kg)    BMI 32.77 kg/m2      The patient is a well-developed, well-nourished female in no acute distress. The patient is alert and oriented times three. The patient appears to be well groomed. Mood and affect are normal.  ORTHOPEDIC EXAM of left wrist:  Inspection: mild swelling  Incision well healed  Range of motion: able to make full fist  ttp distal radius  Stability: Stable  Strength: n/a    IMAGING: 3 view xray of left wrist read by myself reveal hardware in excellent position  IMPRESSION:      ICD-10-CM ICD-9-CM    1. Closed Colles' fracture of left radius with routine healing, subsequent encounter S52.532D V54.12    2. Post-operative state Z98.890 V45.89 AMB POC XRAY, WRIST; COMPLETE, 3+ VIE        PLAN:   1. Splint removed, incision cleaned  2. Removable splint applied  3. Will start OT for range of motion  4. Phenergan for nausea today  5.  Pt will follow up with PCP regarding her recurrent illness  RTC 3 weeks for repeat xrays    Patient seen and evaluated by Dr. Blanquita Martinez today who agrees with treatment plan    Follow-up Disposition: Not on [address], PA-C  [facility]
(4) no limitation

## (undated) DEVICE — NEEDLE SPNL 22GA L3.5IN BLK HUB S STL REG WALL FIT STYL W/

## (undated) DEVICE — INTENDED FOR TISSUE SEPARATION, AND OTHER PROCEDURES THAT REQUIRE A SHARP SURGICAL BLADE TO PUNCTURE OR CUT.: Brand: BARD-PARKER SAFETY BLADES SIZE 10, STERILE

## (undated) DEVICE — DRAPE,HAND,STERILE: Brand: MEDLINE

## (undated) DEVICE — KENDALL SCD EXPRESS SLEEVES, KNEE LENGTH, MEDIUM: Brand: KENDALL SCD

## (undated) DEVICE — 3M™ BAIR PAWS FLEX™ WARMING GOWN, STANDARD, 20 PER CASE 81003: Brand: BAIR PAWS™

## (undated) DEVICE — TABLE COVER: Brand: CONVERTORS

## (undated) DEVICE — OCCLUSIVE GAUZE STRIP,3% BISMUTH TRIBROMOPHENATE IN PETROLATUM BLEND: Brand: XEROFORM

## (undated) DEVICE — KIT CLN UP BON SECOURS MARYV

## (undated) DEVICE — BIT DRL RMFG QC SURGIBIT 2MM --

## (undated) DEVICE — FLEX ADVANTAGE 3000CC: Brand: FLEX ADVANTAGE

## (undated) DEVICE — GAUZE SPONGES,16 PLY: Brand: CURITY

## (undated) DEVICE — BLADE ASSEMB CLP HAIR FINE --

## (undated) DEVICE — Device

## (undated) DEVICE — STOCKINETTE,IMPERVIOUS,12X48,STERILE: Brand: MEDLINE

## (undated) DEVICE — LIGHT HANDLE: Brand: DEVON

## (undated) DEVICE — THREE-QUARTER SHEET: Brand: CONVERTORS

## (undated) DEVICE — SLIM BODY SKIN STAPLER: Brand: APPOSE ULC

## (undated) DEVICE — PACK PROCEDURE SURG MAJ W/ BASIN LF

## (undated) DEVICE — SOLUTION IRRIG 1000ML H2O STRL BLT

## (undated) DEVICE — .054" X 6" GUIDE WIRE: Brand: ACUMED

## (undated) DEVICE — REM POLYHESIVE ADULT PATIENT RETURN ELECTRODE: Brand: VALLEYLAB

## (undated) DEVICE — T15 STICK FIT HEXALOBE DRIVER: Brand: ACUMED

## (undated) DEVICE — SUTURE VCRL SZ 3-0 L27IN ABSRB UD L36MM CT-1 1/2 CIR J258H

## (undated) DEVICE — SUTURE VCRL SZ 0 L36IN ABSRB UD L36MM CT-1 1/2 CIR J946H

## (undated) DEVICE — (D)PREP SKN CHLRAPRP APPL 26ML -- CONVERT TO ITEM 371833

## (undated) DEVICE — SUTURE MCRYL SZ 4-0 L18IN ABSRB UD L19MM PS-2 3/8 CIR PRIM Y496G

## (undated) DEVICE — (D)DRSG BORD MPLX SACRUM 23X23 -- DISC BY MFR USE ITEM 340908

## (undated) DEVICE — SOLUTION IV 1000ML 0.9% SOD CHL